# Patient Record
Sex: FEMALE | Race: WHITE | NOT HISPANIC OR LATINO | Employment: FULL TIME | URBAN - METROPOLITAN AREA
[De-identification: names, ages, dates, MRNs, and addresses within clinical notes are randomized per-mention and may not be internally consistent; named-entity substitution may affect disease eponyms.]

---

## 2017-01-19 ENCOUNTER — ALLSCRIPTS OFFICE VISIT (OUTPATIENT)
Dept: OTHER | Facility: OTHER | Age: 47
End: 2017-01-19

## 2017-01-19 DIAGNOSIS — R59.9 ENLARGED LYMPH NODES, UNSPECIFIED: ICD-10-CM

## 2017-03-09 ENCOUNTER — ALLSCRIPTS OFFICE VISIT (OUTPATIENT)
Dept: OTHER | Facility: OTHER | Age: 47
End: 2017-03-09

## 2017-03-15 ENCOUNTER — GENERIC CONVERSION - ENCOUNTER (OUTPATIENT)
Dept: OTHER | Facility: OTHER | Age: 47
End: 2017-03-15

## 2017-03-27 ENCOUNTER — TRANSCRIBE ORDERS (OUTPATIENT)
Dept: ADMINISTRATIVE | Facility: HOSPITAL | Age: 47
End: 2017-03-27

## 2017-03-27 DIAGNOSIS — Z12.31 ENCOUNTER FOR MAMMOGRAM TO ESTABLISH BASELINE MAMMOGRAM: Primary | ICD-10-CM

## 2017-03-27 DIAGNOSIS — Z91.89 PERSONAL HISTORY OF POISONING, PRESENTING HAZARDS TO HEALTH: ICD-10-CM

## 2017-03-27 DIAGNOSIS — R92.2 BREAST DENSITY: ICD-10-CM

## 2017-04-04 ENCOUNTER — HOSPITAL ENCOUNTER (OUTPATIENT)
Dept: RADIOLOGY | Facility: HOSPITAL | Age: 47
Discharge: HOME | End: 2017-04-04
Attending: SURGERY
Payer: COMMERCIAL

## 2017-04-04 DIAGNOSIS — Z91.89 OTHER SPECIFIED PERSONAL RISK FACTORS, NOT ELSEWHERE CLASSIFIED: ICD-10-CM

## 2017-04-04 DIAGNOSIS — R92.2 INCONCLUSIVE MAMMOGRAM: ICD-10-CM

## 2017-04-04 PROCEDURE — A9585 GADOBUTROL INJECTION: HCPCS | Performed by: SURGERY

## 2017-04-04 PROCEDURE — C8908 MRI W/O FOL W/CONT, BREAST,: HCPCS

## 2017-04-04 RX ADMIN — GADOBUTROL 6 ML: 604.72 INJECTION INTRAVENOUS at 16:34

## 2017-04-18 ENCOUNTER — ALLSCRIPTS OFFICE VISIT (OUTPATIENT)
Dept: OTHER | Facility: OTHER | Age: 47
End: 2017-04-18

## 2017-05-19 ENCOUNTER — ALLSCRIPTS OFFICE VISIT (OUTPATIENT)
Dept: OTHER | Facility: OTHER | Age: 47
End: 2017-05-19

## 2017-05-19 ENCOUNTER — GENERIC CONVERSION - ENCOUNTER (OUTPATIENT)
Dept: OTHER | Facility: OTHER | Age: 47
End: 2017-05-19

## 2017-06-27 ENCOUNTER — ALLSCRIPTS OFFICE VISIT (OUTPATIENT)
Dept: OTHER | Facility: OTHER | Age: 47
End: 2017-06-27

## 2017-06-27 PROCEDURE — 88305 TISSUE EXAM BY PATHOLOGIST: CPT | Performed by: OBSTETRICS & GYNECOLOGY

## 2017-06-29 ENCOUNTER — GENERIC CONVERSION - ENCOUNTER (OUTPATIENT)
Dept: OTHER | Facility: OTHER | Age: 47
End: 2017-06-29

## 2017-06-29 ENCOUNTER — LAB REQUISITION (OUTPATIENT)
Dept: LAB | Facility: HOSPITAL | Age: 47
End: 2017-06-29
Payer: COMMERCIAL

## 2017-06-29 DIAGNOSIS — N92.6 IRREGULAR MENSTRUATION: ICD-10-CM

## 2017-07-03 ENCOUNTER — GENERIC CONVERSION - ENCOUNTER (OUTPATIENT)
Dept: OTHER | Facility: OTHER | Age: 47
End: 2017-07-03

## 2017-07-05 ENCOUNTER — GENERIC CONVERSION - ENCOUNTER (OUTPATIENT)
Dept: OTHER | Facility: OTHER | Age: 47
End: 2017-07-05

## 2017-07-14 ENCOUNTER — ALLSCRIPTS OFFICE VISIT (OUTPATIENT)
Dept: OTHER | Facility: OTHER | Age: 47
End: 2017-07-14

## 2017-07-14 LAB
A/G RATIO (HISTORICAL): 2.3 (ref 1.2–2.2)
ALBUMIN SERPL BCP-MCNC: 4.5 G/DL (ref 3.5–5.5)
ALP SERPL-CCNC: 51 IU/L (ref 39–117)
ALT SERPL W P-5'-P-CCNC: 17 IU/L (ref 0–32)
AST SERPL W P-5'-P-CCNC: 19 IU/L (ref 0–40)
BILIRUB SERPL-MCNC: 0.7 MG/DL (ref 0–1.2)
BUN SERPL-MCNC: 17 MG/DL (ref 6–24)
BUN/CREA RATIO (HISTORICAL): 27 (ref 9–23)
CALCIUM SERPL-MCNC: 9.3 MG/DL (ref 8.7–10.2)
CHLORIDE SERPL-SCNC: 102 MMOL/L (ref 96–106)
CO2 SERPL-SCNC: 24 MMOL/L (ref 18–29)
CREAT SERPL-MCNC: 0.64 MG/DL (ref 0.57–1)
EGFR AFRICAN AMERICAN (HISTORICAL): 122 ML/MIN/1.73
EGFR-AMERICAN CALC (HISTORICAL): 106 ML/MIN/1.73
FSH (HISTORICAL): 5.5 MIU/ML
GLUCOSE SERPL-MCNC: 96 MG/DL (ref 65–99)
POTASSIUM SERPL-SCNC: 4.8 MMOL/L (ref 3.5–5.2)
SODIUM SERPL-SCNC: 141 MMOL/L (ref 134–144)
TOT. GLOBULIN, SERUM (HISTORICAL): 2 G/DL (ref 1.5–4.5)
TOTAL PROTEIN (HISTORICAL): 6.5 G/DL (ref 6–8.5)

## 2017-09-21 ENCOUNTER — GENERIC CONVERSION - ENCOUNTER (OUTPATIENT)
Dept: OTHER | Facility: OTHER | Age: 47
End: 2017-09-21

## 2017-09-21 ENCOUNTER — ANESTHESIA EVENT (OUTPATIENT)
Dept: PERIOP | Facility: HOSPITAL | Age: 47
End: 2017-09-21
Payer: COMMERCIAL

## 2017-09-22 NOTE — ANESTHESIA PREPROCEDURE EVALUATION
Review of Systems/Medical History  Patient summary reviewed  Chart reviewed  History of anesthetic complications PONV    Cardiovascular  Negative cardio ROS    Pulmonary  Negative pulmonary ROS ,        GI/Hepatic  Negative GI/hepatic ROS          Negative  ROS        Endo/Other  Negative endo/other ROS      GYN  Negative gynecology ROS          Hematology  Negative hematology ROS      Musculoskeletal  Negative musculoskeletal ROS        Neurology  Negative neurology ROS      Psychology   Negative psychology ROS Anxiety,            Physical Exam    Airway    Mallampati score:  I  TM Distance: >3 FB  Neck ROM: full     Dental   No notable dental hx     Cardiovascular  Comment: Negative ROS, Rhythm: regular, Rate: normal, Cardiovascular exam normal    Pulmonary  Pulmonary exam normal Breath sounds clear to auscultation,     Other Findings        Anesthesia Plan  ASA Score- 2       Anesthesia Type- general  Comment: SCOP patch ordered      Induction- intravenous      Informed Consent  Anesthetic plan and risks discussed with patient

## 2017-09-29 ENCOUNTER — HOSPITAL ENCOUNTER (OUTPATIENT)
Dept: MAMMOGRAPHY | Facility: MEDICAL CENTER | Age: 47
Discharge: HOME | End: 2017-09-29
Payer: COMMERCIAL

## 2017-09-29 DIAGNOSIS — Z12.31 ENCOUNTER FOR SCREENING MAMMOGRAM FOR MALIGNANT NEOPLASM OF BREAST: ICD-10-CM

## 2017-09-29 PROCEDURE — G0202 SCR MAMMO BI INCL CAD: HCPCS

## 2017-09-29 PROCEDURE — 77063 BREAST TOMOSYNTHESIS BI: CPT

## 2017-10-03 ENCOUNTER — HOSPITAL ENCOUNTER (OUTPATIENT)
Facility: HOSPITAL | Age: 47
Setting detail: OUTPATIENT SURGERY
Discharge: HOME | End: 2017-10-04
Attending: OBSTETRICS & GYNECOLOGY | Admitting: OBSTETRICS & GYNECOLOGY
Payer: COMMERCIAL

## 2017-10-03 ENCOUNTER — ANESTHESIA (OUTPATIENT)
Dept: PERIOP | Facility: HOSPITAL | Age: 47
End: 2017-10-03
Payer: COMMERCIAL

## 2017-10-03 DIAGNOSIS — D25.9 LEIOMYOMA OF UTERUS: ICD-10-CM

## 2017-10-03 DIAGNOSIS — R10.2 PELVIC AND PERINEAL PAIN: ICD-10-CM

## 2017-10-03 LAB
ABO GROUP BLD: NORMAL
ANION GAP SERPL CALCULATED.3IONS-SCNC: 9 MMOL/L (ref 4–13)
APTT PPP: 26 SECONDS (ref 23–35)
BASOPHILS # BLD MANUAL: 0 THOUSAND/UL (ref 0–0.1)
BASOPHILS NFR MAR MANUAL: 0 % (ref 0–1)
BLD GP AB SCN SERPL QL: NEGATIVE
BUN SERPL-MCNC: 11 MG/DL (ref 5–25)
CALCIUM SERPL-MCNC: 7.7 MG/DL (ref 8.3–10.1)
CHLORIDE SERPL-SCNC: 106 MMOL/L (ref 100–108)
CO2 SERPL-SCNC: 24 MMOL/L (ref 21–32)
CREAT SERPL-MCNC: 0.9 MG/DL (ref 0.6–1.3)
EOSINOPHIL # BLD MANUAL: 0 THOUSAND/UL (ref 0–0.4)
EOSINOPHIL NFR BLD MANUAL: 0 % (ref 0–6)
ERYTHROCYTE [DISTWIDTH] IN BLOOD BY AUTOMATED COUNT: 12.7 % (ref 11.6–15.1)
EXT PREGNANCY TEST URINE: NEGATIVE
GFR SERPL CREATININE-BSD FRML MDRD: 75 ML/MIN/1.73SQ M
GLUCOSE SERPL-MCNC: 162 MG/DL (ref 65–140)
HCT VFR BLD AUTO: 38.1 % (ref 34.8–46.1)
HCT VFR BLD AUTO: 39.8 % (ref 34.8–46.1)
HGB BLD-MCNC: 12.8 G/DL (ref 11.5–15.4)
HGB BLD-MCNC: 13.7 G/DL (ref 11.5–15.4)
INR PPP: 0.95 (ref 0.86–1.16)
LYMPHOCYTES # BLD AUTO: 0.71 THOUSAND/UL (ref 0.6–4.47)
LYMPHOCYTES # BLD AUTO: 5 % (ref 14–44)
MCH RBC QN AUTO: 30.9 PG (ref 26.8–34.3)
MCHC RBC AUTO-ENTMCNC: 33.6 G/DL (ref 31.4–37.4)
MCV RBC AUTO: 92 FL (ref 82–98)
MONOCYTES # BLD AUTO: 0.28 THOUSAND/UL (ref 0–1.22)
MONOCYTES NFR BLD: 2 % (ref 4–12)
NEUTROPHILS # BLD MANUAL: 13.13 THOUSAND/UL (ref 1.85–7.62)
NEUTS BAND NFR BLD MANUAL: 5 % (ref 0–8)
NEUTS SEG NFR BLD AUTO: 88 % (ref 43–75)
NRBC BLD AUTO-RTO: 0 /100 WBCS
PLATELET # BLD AUTO: 205 THOUSANDS/UL (ref 149–390)
PLATELET BLD QL SMEAR: ADEQUATE
PMV BLD AUTO: 10.5 FL (ref 8.9–12.7)
POTASSIUM SERPL-SCNC: 4 MMOL/L (ref 3.5–5.3)
PROTHROMBIN TIME: 12.7 SECONDS (ref 12.1–14.4)
RBC # BLD AUTO: 4.14 MILLION/UL (ref 3.81–5.12)
RBC MORPH BLD: NORMAL
RH BLD: POSITIVE
SODIUM SERPL-SCNC: 139 MMOL/L (ref 136–145)
SPECIMEN EXPIRATION DATE: NORMAL
TOTAL CELLS COUNTED SPEC: 100
WBC # BLD AUTO: 14.12 THOUSAND/UL (ref 4.31–10.16)

## 2017-10-03 PROCEDURE — 85730 THROMBOPLASTIN TIME PARTIAL: CPT | Performed by: OBSTETRICS & GYNECOLOGY

## 2017-10-03 PROCEDURE — 85027 COMPLETE CBC AUTOMATED: CPT | Performed by: OBSTETRICS & GYNECOLOGY

## 2017-10-03 PROCEDURE — 86850 RBC ANTIBODY SCREEN: CPT | Performed by: OBSTETRICS & GYNECOLOGY

## 2017-10-03 PROCEDURE — 85610 PROTHROMBIN TIME: CPT | Performed by: OBSTETRICS & GYNECOLOGY

## 2017-10-03 PROCEDURE — 80048 BASIC METABOLIC PNL TOTAL CA: CPT | Performed by: OBSTETRICS & GYNECOLOGY

## 2017-10-03 PROCEDURE — 85007 BL SMEAR W/DIFF WBC COUNT: CPT | Performed by: OBSTETRICS & GYNECOLOGY

## 2017-10-03 PROCEDURE — 88307 TISSUE EXAM BY PATHOLOGIST: CPT | Performed by: OBSTETRICS & GYNECOLOGY

## 2017-10-03 PROCEDURE — 85014 HEMATOCRIT: CPT | Performed by: OBSTETRICS & GYNECOLOGY

## 2017-10-03 PROCEDURE — 85018 HEMOGLOBIN: CPT | Performed by: OBSTETRICS & GYNECOLOGY

## 2017-10-03 PROCEDURE — 86900 BLOOD TYPING SEROLOGIC ABO: CPT | Performed by: OBSTETRICS & GYNECOLOGY

## 2017-10-03 PROCEDURE — 81025 URINE PREGNANCY TEST: CPT | Performed by: ANESTHESIOLOGY

## 2017-10-03 PROCEDURE — 86901 BLOOD TYPING SEROLOGIC RH(D): CPT | Performed by: OBSTETRICS & GYNECOLOGY

## 2017-10-03 RX ORDER — MEPERIDINE HYDROCHLORIDE 50 MG/ML
12.5 INJECTION INTRAMUSCULAR; INTRAVENOUS; SUBCUTANEOUS ONCE AS NEEDED
Status: DISCONTINUED | OUTPATIENT
Start: 2017-10-03 | End: 2017-10-03 | Stop reason: HOSPADM

## 2017-10-03 RX ORDER — FENTANYL CITRATE/PF 50 MCG/ML
50 SYRINGE (ML) INJECTION
Status: DISCONTINUED | OUTPATIENT
Start: 2017-10-03 | End: 2017-10-03 | Stop reason: HOSPADM

## 2017-10-03 RX ORDER — HYDROCODONE BITARTRATE AND ACETAMINOPHEN 5; 325 MG/1; MG/1
2 TABLET ORAL EVERY 6 HOURS PRN
Status: DISCONTINUED | OUTPATIENT
Start: 2017-10-03 | End: 2017-10-04 | Stop reason: HOSPADM

## 2017-10-03 RX ORDER — HYDROCODONE BITARTRATE AND ACETAMINOPHEN 5; 325 MG/1; MG/1
1 TABLET ORAL EVERY 6 HOURS PRN
Qty: 20 TABLET | Refills: 0 | Status: SHIPPED | OUTPATIENT
Start: 2017-10-03 | End: 2017-10-08

## 2017-10-03 RX ORDER — SODIUM CHLORIDE, SODIUM LACTATE, POTASSIUM CHLORIDE, CALCIUM CHLORIDE 600; 310; 30; 20 MG/100ML; MG/100ML; MG/100ML; MG/100ML
125 INJECTION, SOLUTION INTRAVENOUS CONTINUOUS
Status: DISCONTINUED | OUTPATIENT
Start: 2017-10-03 | End: 2017-10-04 | Stop reason: HOSPADM

## 2017-10-03 RX ORDER — HYDROMORPHONE HYDROCHLORIDE 2 MG/ML
INJECTION, SOLUTION INTRAMUSCULAR; INTRAVENOUS; SUBCUTANEOUS AS NEEDED
Status: DISCONTINUED | OUTPATIENT
Start: 2017-10-03 | End: 2017-10-03 | Stop reason: SURG

## 2017-10-03 RX ORDER — EPHEDRINE SULFATE 50 MG/ML
INJECTION, SOLUTION INTRAVENOUS AS NEEDED
Status: DISCONTINUED | OUTPATIENT
Start: 2017-10-03 | End: 2017-10-03 | Stop reason: SURG

## 2017-10-03 RX ORDER — DOCUSATE SODIUM 100 MG/1
100 CAPSULE, LIQUID FILLED ORAL 2 TIMES DAILY
Status: DISCONTINUED | OUTPATIENT
Start: 2017-10-03 | End: 2017-10-04 | Stop reason: HOSPADM

## 2017-10-03 RX ORDER — PROPOFOL 10 MG/ML
INJECTION, EMULSION INTRAVENOUS AS NEEDED
Status: DISCONTINUED | OUTPATIENT
Start: 2017-10-03 | End: 2017-10-03 | Stop reason: SURG

## 2017-10-03 RX ORDER — SODIUM CHLORIDE, SODIUM LACTATE, POTASSIUM CHLORIDE, CALCIUM CHLORIDE 600; 310; 30; 20 MG/100ML; MG/100ML; MG/100ML; MG/100ML
125 INJECTION, SOLUTION INTRAVENOUS CONTINUOUS
Status: DISCONTINUED | OUTPATIENT
Start: 2017-10-03 | End: 2017-10-03 | Stop reason: HOSPADM

## 2017-10-03 RX ORDER — ACETAMINOPHEN 325 MG/1
650 TABLET ORAL EVERY 6 HOURS PRN
Status: DISCONTINUED | OUTPATIENT
Start: 2017-10-03 | End: 2017-10-04 | Stop reason: HOSPADM

## 2017-10-03 RX ORDER — MIDAZOLAM HYDROCHLORIDE 1 MG/ML
INJECTION INTRAMUSCULAR; INTRAVENOUS AS NEEDED
Status: DISCONTINUED | OUTPATIENT
Start: 2017-10-03 | End: 2017-10-03 | Stop reason: SURG

## 2017-10-03 RX ORDER — DEXAMETHASONE SODIUM PHOSPHATE 4 MG/ML
INJECTION, SOLUTION INTRA-ARTICULAR; INTRALESIONAL; INTRAMUSCULAR; INTRAVENOUS; SOFT TISSUE AS NEEDED
Status: DISCONTINUED | OUTPATIENT
Start: 2017-10-03 | End: 2017-10-03 | Stop reason: SURG

## 2017-10-03 RX ORDER — IBUPROFEN 600 MG/1
600 TABLET ORAL EVERY 6 HOURS PRN
Status: DISCONTINUED | OUTPATIENT
Start: 2017-10-03 | End: 2017-10-04 | Stop reason: HOSPADM

## 2017-10-03 RX ORDER — FENTANYL CITRATE 50 UG/ML
INJECTION, SOLUTION INTRAMUSCULAR; INTRAVENOUS AS NEEDED
Status: DISCONTINUED | OUTPATIENT
Start: 2017-10-03 | End: 2017-10-03 | Stop reason: SURG

## 2017-10-03 RX ORDER — ONDANSETRON 2 MG/ML
4 INJECTION INTRAMUSCULAR; INTRAVENOUS ONCE AS NEEDED
Status: DISCONTINUED | OUTPATIENT
Start: 2017-10-03 | End: 2017-10-03 | Stop reason: HOSPADM

## 2017-10-03 RX ORDER — GLYCOPYRROLATE 0.2 MG/ML
INJECTION INTRAMUSCULAR; INTRAVENOUS AS NEEDED
Status: DISCONTINUED | OUTPATIENT
Start: 2017-10-03 | End: 2017-10-03 | Stop reason: SURG

## 2017-10-03 RX ORDER — SODIUM CHLORIDE 9 MG/ML
125 INJECTION, SOLUTION INTRAVENOUS CONTINUOUS
Status: DISCONTINUED | OUTPATIENT
Start: 2017-10-03 | End: 2017-10-03 | Stop reason: HOSPADM

## 2017-10-03 RX ORDER — HYDROCODONE BITARTRATE AND ACETAMINOPHEN 5; 325 MG/1; MG/1
1 TABLET ORAL EVERY 6 HOURS PRN
Status: DISCONTINUED | OUTPATIENT
Start: 2017-10-03 | End: 2017-10-04 | Stop reason: HOSPADM

## 2017-10-03 RX ORDER — ONDANSETRON 2 MG/ML
4 INJECTION INTRAMUSCULAR; INTRAVENOUS EVERY 6 HOURS PRN
Status: DISCONTINUED | OUTPATIENT
Start: 2017-10-03 | End: 2017-10-04 | Stop reason: HOSPADM

## 2017-10-03 RX ORDER — SCOLOPAMINE TRANSDERMAL SYSTEM 1 MG/1
1 PATCH, EXTENDED RELEASE TRANSDERMAL ONCE AS NEEDED
Status: DISCONTINUED | OUTPATIENT
Start: 2017-10-03 | End: 2017-10-03

## 2017-10-03 RX ORDER — SODIUM CHLORIDE 9 MG/ML
INJECTION, SOLUTION INTRAVENOUS CONTINUOUS PRN
Status: DISCONTINUED | OUTPATIENT
Start: 2017-10-03 | End: 2017-10-03 | Stop reason: SURG

## 2017-10-03 RX ORDER — ONDANSETRON 2 MG/ML
INJECTION INTRAMUSCULAR; INTRAVENOUS AS NEEDED
Status: DISCONTINUED | OUTPATIENT
Start: 2017-10-03 | End: 2017-10-03 | Stop reason: SURG

## 2017-10-03 RX ORDER — ROCURONIUM BROMIDE 10 MG/ML
INJECTION, SOLUTION INTRAVENOUS AS NEEDED
Status: DISCONTINUED | OUTPATIENT
Start: 2017-10-03 | End: 2017-10-03 | Stop reason: SURG

## 2017-10-03 RX ORDER — ALPRAZOLAM 0.25 MG/1
0.25 TABLET ORAL 3 TIMES DAILY PRN
Status: DISCONTINUED | OUTPATIENT
Start: 2017-10-03 | End: 2017-10-04 | Stop reason: HOSPADM

## 2017-10-03 RX ADMIN — ROCURONIUM BROMIDE 10 MG: 10 INJECTION, SOLUTION INTRAVENOUS at 16:27

## 2017-10-03 RX ADMIN — ROCURONIUM BROMIDE 50 MG: 10 INJECTION, SOLUTION INTRAVENOUS at 14:31

## 2017-10-03 RX ADMIN — CEFAZOLIN SODIUM 1000 MG: 1 SOLUTION INTRAVENOUS at 14:37

## 2017-10-03 RX ADMIN — ROCURONIUM BROMIDE 10 MG: 10 INJECTION, SOLUTION INTRAVENOUS at 16:53

## 2017-10-03 RX ADMIN — HYDROCODONE BITARTRATE AND ACETAMINOPHEN 2 TABLET: 5; 325 TABLET ORAL at 21:33

## 2017-10-03 RX ADMIN — FENTANYL CITRATE 50 MCG: 50 INJECTION, SOLUTION INTRAMUSCULAR; INTRAVENOUS at 16:53

## 2017-10-03 RX ADMIN — FENTANYL CITRATE 100 MCG: 50 INJECTION, SOLUTION INTRAMUSCULAR; INTRAVENOUS at 14:31

## 2017-10-03 RX ADMIN — SODIUM CHLORIDE 1000 ML: 0.9 INJECTION, SOLUTION INTRAVENOUS at 20:27

## 2017-10-03 RX ADMIN — SODIUM CHLORIDE: 0.9 INJECTION, SOLUTION INTRAVENOUS at 14:38

## 2017-10-03 RX ADMIN — FENTANYL CITRATE 50 MCG: 50 INJECTION INTRAMUSCULAR; INTRAVENOUS at 18:20

## 2017-10-03 RX ADMIN — GLYCOPYRROLATE 0.6 MG: 0.2 INJECTION, SOLUTION INTRAMUSCULAR; INTRAVENOUS at 17:11

## 2017-10-03 RX ADMIN — HYDROMORPHONE HYDROCHLORIDE 0.25 MG: 2 INJECTION, SOLUTION INTRAMUSCULAR; INTRAVENOUS; SUBCUTANEOUS at 17:25

## 2017-10-03 RX ADMIN — HYDROMORPHONE HYDROCHLORIDE 0.25 MG: 2 INJECTION, SOLUTION INTRAMUSCULAR; INTRAVENOUS; SUBCUTANEOUS at 17:18

## 2017-10-03 RX ADMIN — EPHEDRINE SULFATE 10 MG: 50 INJECTION, SOLUTION INTRAMUSCULAR; INTRAVENOUS; SUBCUTANEOUS at 15:03

## 2017-10-03 RX ADMIN — SODIUM CHLORIDE, SODIUM LACTATE, POTASSIUM CHLORIDE, AND CALCIUM CHLORIDE 125 ML/HR: .6; .31; .03; .02 INJECTION, SOLUTION INTRAVENOUS at 23:49

## 2017-10-03 RX ADMIN — SCOPALAMINE 1 PATCH: 1 PATCH, EXTENDED RELEASE TRANSDERMAL at 13:26

## 2017-10-03 RX ADMIN — HYDROMORPHONE HYDROCHLORIDE 0.5 MG: 2 INJECTION, SOLUTION INTRAMUSCULAR; INTRAVENOUS; SUBCUTANEOUS at 17:27

## 2017-10-03 RX ADMIN — SODIUM CHLORIDE: 0.9 INJECTION, SOLUTION INTRAVENOUS at 16:06

## 2017-10-03 RX ADMIN — HYDROMORPHONE HYDROCHLORIDE 0.25 MG: 2 INJECTION, SOLUTION INTRAMUSCULAR; INTRAVENOUS; SUBCUTANEOUS at 17:21

## 2017-10-03 RX ADMIN — DEXAMETHASONE SODIUM PHOSPHATE 4 MG: 4 INJECTION, SOLUTION INTRAMUSCULAR; INTRAVENOUS at 17:04

## 2017-10-03 RX ADMIN — ROCURONIUM BROMIDE 10 MG: 10 INJECTION, SOLUTION INTRAVENOUS at 15:30

## 2017-10-03 RX ADMIN — DOCUSATE SODIUM 100 MG: 100 CAPSULE, LIQUID FILLED ORAL at 20:31

## 2017-10-03 RX ADMIN — DEXAMETHASONE SODIUM PHOSPHATE 4 MG: 4 INJECTION, SOLUTION INTRAMUSCULAR; INTRAVENOUS at 14:44

## 2017-10-03 RX ADMIN — FENTANYL CITRATE 50 MCG: 50 INJECTION, SOLUTION INTRAMUSCULAR; INTRAVENOUS at 16:05

## 2017-10-03 RX ADMIN — EPHEDRINE SULFATE 10 MG: 50 INJECTION, SOLUTION INTRAMUSCULAR; INTRAVENOUS; SUBCUTANEOUS at 16:29

## 2017-10-03 RX ADMIN — HYDROMORPHONE HYDROCHLORIDE 0.25 MG: 2 INJECTION, SOLUTION INTRAMUSCULAR; INTRAVENOUS; SUBCUTANEOUS at 17:15

## 2017-10-03 RX ADMIN — NEOSTIGMINE METHYLSULFATE 3 MG: 1 INJECTION, SOLUTION INTRAMUSCULAR; INTRAVENOUS; SUBCUTANEOUS at 17:11

## 2017-10-03 RX ADMIN — MIDAZOLAM HYDROCHLORIDE 2 MG: 1 INJECTION, SOLUTION INTRAMUSCULAR; INTRAVENOUS at 14:22

## 2017-10-03 RX ADMIN — FENTANYL CITRATE 50 MCG: 50 INJECTION INTRAMUSCULAR; INTRAVENOUS at 18:25

## 2017-10-03 RX ADMIN — ONDANSETRON HYDROCHLORIDE 4 MG: 2 INJECTION, SOLUTION INTRAVENOUS at 14:44

## 2017-10-03 RX ADMIN — ONDANSETRON HYDROCHLORIDE 4 MG: 2 INJECTION, SOLUTION INTRAVENOUS at 17:04

## 2017-10-03 RX ADMIN — PROPOFOL 200 MG: 10 INJECTION, EMULSION INTRAVENOUS at 14:31

## 2017-10-03 RX ADMIN — HYDROMORPHONE HYDROCHLORIDE 0.5 MG: 2 INJECTION, SOLUTION INTRAMUSCULAR; INTRAVENOUS; SUBCUTANEOUS at 17:34

## 2017-10-03 RX ADMIN — LIDOCAINE HYDROCHLORIDE 60 MG: 20 INJECTION, SOLUTION INTRAVENOUS at 14:31

## 2017-10-03 RX ADMIN — SODIUM CHLORIDE, SODIUM LACTATE, POTASSIUM CHLORIDE, AND CALCIUM CHLORIDE 125 ML/HR: .6; .31; .03; .02 INJECTION, SOLUTION INTRAVENOUS at 18:12

## 2017-10-03 RX ADMIN — ACETAMINOPHEN 650 MG: 325 TABLET, FILM COATED ORAL at 20:26

## 2017-10-03 RX ADMIN — SODIUM CHLORIDE 125 ML/HR: 0.9 INJECTION, SOLUTION INTRAVENOUS at 13:32

## 2017-10-03 RX ADMIN — IBUPROFEN 600 MG: 600 TABLET, FILM COATED ORAL at 23:48

## 2017-10-03 NOTE — OP NOTE
OPERATIVE REPORT  PATIENT NAME: Mark Anthony Mercado    :  1970  MRN: [id number]  Pt Location: [facility] OR ROOM 03    SURGERY DATE: 10/3/2017    Surgeon(s) and Role:     * Lesley Larson DO - Primary     * Bette Arriaga MD - Assisting     * Austen Cotto MD - Fellow, Assisting    Preop Diagnosis:  Leiomyoma of uterus [D25 9]  Pelvic and perineal pain [R10 2]    Post-Op Diagnosis Codes:     * Leiomyoma of uterus [D25 9]     * Pelvic and perineal pain [R10 2]    Procedure(s) (LRB):  HYSTERECTOMY LAPAROSCOPIC SUPRACERVICAL ([address]) (N/A)  SALPINGECTOMY (Bilateral)  CYSTOSCOPY (N/A)    Specimen(s):  ID Type Source Tests Collected by Time Destination   1 : Uterus, bilateral fallopian tubes, and fibroids (morcelated in bag) Tissue Uterus TISSUE EXAM Lesley Larson DO 10/3/2017 1604        Estimated Blood Loss:   100 mL    Drains:   None    Anesthesia Type:   General    Operative Indications:  Leiomyoma of uterus [D25 9]  Pelvic and perineal pain [R10 2]      Operative Findings:  multiglobular fibroid uterus approx 10 weeks in size  Normal ovaries and tubes bilaterally  Implants of endometriosis left pelvic sidewall  Postprocedural cystoscopy showed intact bladder mucosa, no defects  Good efflux noted bilaterally  Normal urethra    Complications:   None    Procedure and Technique:  Appropriate preoperative antibiotics chosen per ACOG guidelines were given  Bilateral SCDs were placed in the lower extremities for DVT prevention prior to the institution of anesthesia  No bladder, ureteral, viscus, or solid organ injury were noted at the end of the procedure  The patient was properly identified in the holding area by the operating room staff and attending physician  She was taken to   operating room where general anesthesia was instituted without complication  She was placed in the dorsal lithotomy position with her   legs in [address] [place] [address] with care taken to avoid excessive flexion or extension of her lower extremities   Once the patient was properly positioned, the patient was prepped and draped in normal sterile   fashion  A time-out was taken  The patient was again properly identified by the operating room staff and attending physician  At this time, the patient was receiving antibiotics for prophylaxis       The uterine manipulator was placed without complications  Only the tip of the Vulsellum manipulator was used after we sounded the uterus to 6 cm  A Nunn   catheter was aseptically inserted  Gloves were changed, and attention was directed to the abdomen       Two Allis clamps were used to susanna the umbilicus  A 10-mm incision was created at the level of the umbilicus  A 10mm optical trochar was tent advanced under direct visualization into the peritoneal cavity with the skin tented with  Graspers  Once we were intraperitoneal, we allowed CO2 gas to go in until we reached a pressure of 15 mmHg  We visualized the area underneath our trochar site, no bleeding or injury was noted  Next, two additional trocars were placed on the   lower quadrant approximately 2 fingerbreadths above the anterior superior iliac spine and 2 cm medial to that spot  A 5-mm incision was created under transillumination, and we always observed the   inferior epigastric vessels trying to avoid injury to this vessel  Once the 3 trocars were placed, we started the procedure       First, a survey of the cavity was performed, and we confirmed the above-mentioned findings  We started a salpingectomy on the right side  The salpingectomy was performed by transecting the right   fallopian tube to the mesosalpinx all the way to the level of the cornu  This was resected, and we used the LigaSure device for this purpose  The left contralateral tube was treated in the same manner  At this time, a salpingectomy was performed and the tubes were removed   The round ligament was opened on the right side, and we developed the vesicouterine space from right to left until we were   able to develop the vesicovaginal space without complications  The bladder was dissected down to decrease the injury to the bladder  Once we created the vesicovaginal space, we were able to transect the utero-ovarian ligament on the right side and transected and cut  The posterior lip of the peritoneum was skeletonized until we were able to see the uterine arteries at the level of the internal cervical os  These uterine arteries were double burned and cut without complications  The left side was treated in the same fashion, first   taking the round ligament down and developing the vesicouterine space and then transecting the utero-ovarian ligament and then skeletonizing the uterine arteries  Once we secured the uterine arteries on the left side, we noticed blanching of the uterus  At this time we performed a myomectomy by amputating some of the pedunculated fibroids with a monopolar hook cautery to decrease the bulk of the uterus and obtain better visualization of the lower uterine segment  A total of 5 pedunculated fibroids were removed in this fashion  We introduced the Radha loop  Once we were able to introduce the Radha loop, this was introduced and placed at the level of the endocervical canal  The uterine manipulator was removed  We activated the loop and we transected the corpus of the uterus without complications  We noticed a small bleeding vessel near the left uterine artery bundle  This was secured and rendered hemostatic with a short burst of bipolar cautery  The endocervical canal was fulgurated using the monopolar cautery  Once we transected the corpus of the uterus, a 2 5-cm incision was created at the level of the suprapubic area in the midline in a horizontal fashion  We used a scalpel to create an   incision, and then dissection was created until we reached the rectus fascia  The rectus fascia was opened with a scalpel and Moran scissors   Once we were able to extend the rectus fascia laterally, we entered   the peritoneal cavity with sharp dissection  This was used and extended, and we inserted a small Curtis O retractor  A GelPoint was placed, and then we obtained pneumoperitoneum again  This was   achieved, and a 10-mm trocar was inserted through the GelPoint and we were able to introduce the 15-mm bag without complications  The specimen was placed in the bag, and the bag was retrieved without   complications  The fibroid uterus was morcellated in the bag using the excite technique and removed  The bag was inspected and visualized intacted  Pneumoperitoneum was achieved by replacing the Gelport, each of the 5 previously amputated pedunculated fibroids were retrieved and removed under direct visualization through the 2 5cm incision  The Curtis O retractor was removed  The fascia was closed with 0 Vicryl suture in a running stitch  The subcutaneous fascia was closed with plain suture  The skin was closed with 4-0 Monocryl       Using the laparoscopic trocar, we did a survey of the pelvic cavity and we confirmed good hemostasis  This was confirmed with the intraabdominal pressure dropped to 5 mmhg  A piece of surgicel was then placed along the cervical stump which was hemostatic  The procedure   was concluded  A vaginal exam was done  The cervix was hemostatic  The Nunn catheter was then used to drain the bladder and then it was removed and a diagnostic cystoscopy was performed by instilling 300 mL of fluid into the bladder  Both ureters were effluxing normally and there were no lacerations in the lower urinary tract  The patient tolerated the procedure well  The trocars were removed  The gas was allowed to escape  The skin incisions were closed with  4-0 vicryl without complications  The patient   went to the recovery room in a stable condition, and she is stable at the moment of dictation  All sponge, lap, needle, instrument counts were correct times 2        The patient was given a prescription for postoperative pain control  The prescription narcotic monitoring site was reviewed for this patient's medication history      Dr Dario Cuba was present for the entire procedure    Patient Disposition:  PACU     SIGNATURE: Harman Savage MD  DATE: October 3, 2017  TIME: 5:22 PM

## 2017-10-03 NOTE — DISCHARGE INSTRUCTIONS
Laparoscopic Hysterectomy   WHAT YOU SHOULD KNOW:   Laparoscopic hysterectomy [facility]) is surgery to remove your uterus only (partial hysterectomy), or your uterus and cervix (total hysterectomy)  Other organs, such as your ovaries and fallopian tubes, may also be removed  AFTER YOU LEAVE:   Medicines:   · Medicines  may be given to decrease pain or to treat or prevent a bacterial infection  Ask your primary healthcare provider [facility]) how to take prescription pain medicine safely  You may also need to take hormone medicine, such as estrogen  · Take your medicine as directed  Contact your PHP if you think your medicine is not helping or if you have side effects  Tell him if you are allergic to any medicine  Keep a list of the medicines, vitamins, and herbs you take  Include the amounts, and when and why you take them  Bring the list or the pill bottles to follow-up visits  Carry your medicine list with you in case of an emergency  Activity guidelines: You may feel like resting more after surgery  Slowly start to do more each day  Rest when you feel it is needed  Ask when you can return to your usual activities  What to expect after surgery:   · Ask your gynecologist or PHP about routine tests that you will need  · It is normal to have some bleeding from your vagina after certain types of hysterectomy surgery  Ask your gynecologist or PHP if you should expect bleeding, and how much bleeding to expect  Contact your gynecologist or PHP if:   · You have a fever  · You have pain that gets worse or does not decrease or go away with treatment  · You notice pus or a foul-smelling drainage coming from an incision, or it is red or swollen  · You have new or more bright red bleeding from your vagina or your incisions  · You have yellow, green, or foul-smelling discharge coming from your vagina  · You have trouble urinating, burning when you urinate, or feel a need to urinate often      · You have trouble having a bowel movement  · Your skin is itchy, swollen, or has a rash  · You have questions or concerns about your condition or care  Seek care immediately or call 911 if:   · You are bleeding from your vagina, or bleeding more than you were told to expect  · Your arm or leg feels warm, tender, and painful  It may look swollen and red  · You feel lightheaded, short of breath, and have chest pain  · You cough up blood  © 2014 [address] is for End User's use only and may not be sold, redistributed or otherwise used for commercial purposes  All illustrations and images included in CareNotes® are the copyrighted property of A D A M , Inc  or Jordy Rodriguez  The above information is an  only  It is not intended as medical advice for individual conditions or treatments  Talk to your doctor, nurse or pharmacist before following any medical regimen to see if it is safe and effective for you

## 2017-10-03 NOTE — DISCHARGE SUMMARY
Discharge Summary   Pravin Akhtar MRN: [id number]  Unit/Bed#: [place] [address] 223-01 Encounter: [id number]      Admission Date: 10/3/2017     Discharge Date: 10/4/17    Attending: No att  providers found    Principal Diagnosis: Leiomyoma of uterus [D25 9]  Pelvic and perineal pain [R10 2]    Secondary Diagnosis: psoriasis, lichen simplex chronicus, anxiety, lactose & gluten intolerance    Procedures: diagnostic laparoscopy, supracervical hysterectomy, cystoscopy    Hospital course: Patient was admitted for post-op care  Her stafford catheter was removed in the OR and was able to void spontaneously  She developed a low grade fever 100 5F POD#0 that was felt to be related to anesthesia with no recurrences on POD#1  Her home medications were continued for her chronic conditions, which were stable during her stay  Her pain was controlled with PO tylenol motrin, and norco      She was discharged home on POD#1  At this time she was ambulating, pain was well controlled on oral medications, she was voiding spontaneously, and had appropriate bowel function  She will follow in in 1-2 weeks  Lab Results:   Lab Results   Component Value Date    WBC 12 57 (H) 10/04/2017    HGB 11 9 10/04/2017    HCT 36 1 10/04/2017    MCV 94 10/04/2017     10/04/2017     Lab Results   Component Value Date    GLUCOSE 104 10/04/2017    CALCIUM 7 9 (L) 10/04/2017     10/04/2017    K 5 1 10/04/2017    CO2 22 10/04/2017     10/04/2017    BUN 8 10/04/2017    CREATININE 0 59 (L) 10/04/2017     No results found for: POCGLU  Lab Results   Component Value Date    PTT 26 10/03/2017     Lab Results   Component Value Date    INR 0 95 10/03/2017    PROTIME 12 7 20/05/7013       Complications: none apparent    Condition at discharge: stable     Discharge instructions/Information to patient and family:   See After Visit Summary for information provided to patient and family        Provisions for Follow-Up Care:  See After Visit Summary for information related to follow-up care and any pertinent home health orders  Disposition: See After Visit Summary for discharge disposition information  Planned Readmission: No    Discharge Medications: For a complete list of the patient's medications, please refer to her med rec      Leroy Villarreal MD

## 2017-10-04 VITALS
RESPIRATION RATE: 20 BRPM | BODY MASS INDEX: 23.57 KG/M2 | HEIGHT: 63 IN | OXYGEN SATURATION: 98 % | HEART RATE: 75 BPM | TEMPERATURE: 100.1 F | SYSTOLIC BLOOD PRESSURE: 108 MMHG | DIASTOLIC BLOOD PRESSURE: 54 MMHG | WEIGHT: 133 LBS

## 2017-10-04 LAB
ANION GAP SERPL CALCULATED.3IONS-SCNC: 8 MMOL/L (ref 4–13)
BACTERIA UR QL AUTO: ABNORMAL /HPF
BILIRUB UR QL STRIP: NEGATIVE
BUN SERPL-MCNC: 8 MG/DL (ref 5–25)
CALCIUM SERPL-MCNC: 7.9 MG/DL (ref 8.3–10.1)
CHLORIDE SERPL-SCNC: 106 MMOL/L (ref 100–108)
CLARITY UR: CLEAR
CO2 SERPL-SCNC: 22 MMOL/L (ref 21–32)
COLOR UR: YELLOW
CREAT SERPL-MCNC: 0.59 MG/DL (ref 0.6–1.3)
ERYTHROCYTE [DISTWIDTH] IN BLOOD BY AUTOMATED COUNT: 12.9 % (ref 11.6–15.1)
GFR SERPL CREATININE-BSD FRML MDRD: 108 ML/MIN/1.73SQ M
GLUCOSE P FAST SERPL-MCNC: 104 MG/DL (ref 65–99)
GLUCOSE SERPL-MCNC: 104 MG/DL (ref 65–140)
GLUCOSE UR STRIP-MCNC: NEGATIVE MG/DL
HCT VFR BLD AUTO: 36.1 % (ref 34.8–46.1)
HGB BLD-MCNC: 11.9 G/DL (ref 11.5–15.4)
HGB UR QL STRIP.AUTO: ABNORMAL
KETONES UR STRIP-MCNC: NEGATIVE MG/DL
LEUKOCYTE ESTERASE UR QL STRIP: NEGATIVE
MCH RBC QN AUTO: 30.8 PG (ref 26.8–34.3)
MCHC RBC AUTO-ENTMCNC: 33 G/DL (ref 31.4–37.4)
MCV RBC AUTO: 94 FL (ref 82–98)
NITRITE UR QL STRIP: NEGATIVE
NON-SQ EPI CELLS URNS QL MICRO: ABNORMAL /HPF
PH UR STRIP.AUTO: 5.5 [PH] (ref 4.5–8)
PLATELET # BLD AUTO: 188 THOUSANDS/UL (ref 149–390)
PMV BLD AUTO: 11 FL (ref 8.9–12.7)
POTASSIUM SERPL-SCNC: 5.1 MMOL/L (ref 3.5–5.3)
PROT UR STRIP-MCNC: NEGATIVE MG/DL
RBC # BLD AUTO: 3.86 MILLION/UL (ref 3.81–5.12)
RBC #/AREA URNS AUTO: ABNORMAL /HPF
SODIUM SERPL-SCNC: 136 MMOL/L (ref 136–145)
SP GR UR STRIP.AUTO: <=1.005 (ref 1–1.03)
UROBILINOGEN UR QL STRIP.AUTO: 0.2 E.U./DL
WBC # BLD AUTO: 12.57 THOUSAND/UL (ref 4.31–10.16)
WBC #/AREA URNS AUTO: ABNORMAL /HPF

## 2017-10-04 PROCEDURE — 85027 COMPLETE CBC AUTOMATED: CPT | Performed by: OBSTETRICS & GYNECOLOGY

## 2017-10-04 PROCEDURE — 87086 URINE CULTURE/COLONY COUNT: CPT | Performed by: OBSTETRICS & GYNECOLOGY

## 2017-10-04 PROCEDURE — 80048 BASIC METABOLIC PNL TOTAL CA: CPT | Performed by: OBSTETRICS & GYNECOLOGY

## 2017-10-04 PROCEDURE — 81001 URINALYSIS AUTO W/SCOPE: CPT | Performed by: OBSTETRICS & GYNECOLOGY

## 2017-10-04 RX ORDER — NITROFURANTOIN 25; 75 MG/1; MG/1
100 CAPSULE ORAL 2 TIMES DAILY
Qty: 14 CAPSULE | Refills: 0 | Status: SHIPPED | OUTPATIENT
Start: 2017-10-04 | End: 2017-10-11

## 2017-10-04 RX ADMIN — DOCUSATE SODIUM 100 MG: 100 CAPSULE, LIQUID FILLED ORAL at 08:14

## 2017-10-04 RX ADMIN — IBUPROFEN 600 MG: 600 TABLET, FILM COATED ORAL at 13:42

## 2017-10-04 RX ADMIN — DOCUSATE SODIUM 100 MG: 100 CAPSULE, LIQUID FILLED ORAL at 17:22

## 2017-10-04 RX ADMIN — IBUPROFEN 600 MG: 600 TABLET, FILM COATED ORAL at 07:40

## 2017-10-04 RX ADMIN — HYDROCODONE BITARTRATE AND ACETAMINOPHEN 1 TABLET: 5; 325 TABLET ORAL at 03:33

## 2017-10-04 RX ADMIN — SODIUM CHLORIDE, SODIUM LACTATE, POTASSIUM CHLORIDE, AND CALCIUM CHLORIDE 125 ML/HR: .6; .31; .03; .02 INJECTION, SOLUTION INTRAVENOUS at 06:18

## 2017-10-04 NOTE — PROGRESS NOTES
Progress Note - OB/GYN  Nash Marte 50 y o  female MRN: [id number]  Unit/Bed#: [place] [address] 2 [address] 87 223-01 Encounter: [id number]      Nash Marte has no current complaints  Pain is present - adequately treated  Patient is currently voiding  She is ambulating  Patient is currently passing flatus and has had no bowel movement  She is tolerating PO, and denies nausea or vomitting  Patient denies fever, chills, chest pain, shortness of breath, or calf tenderness  /57   Pulse 75   Temp 98 8 °F (37 1 °C) (Tympanic)   Resp 18   Ht 5' 3" (1 6 m)   Wt 60 3 kg (133 lb)   LMP 09/09/2017 (Exact Date)   SpO2 98%   BMI 23 56 kg/m²     I/O last 3 completed shifts: In: 3150 [I V :3150]  Out: 350 [Urine:250; Blood:100]  I/O this shift:  In: 2275 8 [P O :480; I V :795 8; IV Piggyback:1000]  Out: 675 [Urine:675]    Lab Results   Component Value Date    WBC 12 57 (H) 10/04/2017    HGB 11 9 10/04/2017    HCT 36 1 10/04/2017    MCV 94 10/04/2017     10/04/2017       Lab Results   Component Value Date    GLUCOSE 162 (H) 10/03/2017    CALCIUM 7 7 (L) 10/03/2017     10/03/2017    K 4 0 10/03/2017    CO2 24 10/03/2017     10/03/2017    BUN 11 10/03/2017    CREATININE 0 90 10/03/2017       No results found for: POCGLU    Physical Exam  Gen: AAOx3, NAD, comfortable in bed  CVS: S1S2+, RRR, no murmurs  Lungs: CTA b/l normal respiratory effort and rate  Abdomen: soft, non tender, incisions C/D/I  Extremities: SCDs on and on, non tender    A/P: 49 yo S/p LSH, BS ancd cysto POD#1  1) Pain: well controlled on current regimen, continue  2) FEN: LR 125cc/h, Regular ADAT  3) Postop fever: resolved, likely 2/2 to anesthesia   4) AM labs: Hb from 12 8 to 11 9, WBC trending down from 14 12 to 12 57  5) DVT PPx: SCDs, Encouraged incentive spirometry to reduce atelectasis and pneumonia risk, Encouraged ambulation as tolerated    Dispo:  Anticipate DC later today

## 2017-10-04 NOTE — PROGRESS NOTES
Patient felt a need to void and ambulated for BRP, however was only able to void 25ml  OB resident on the floor to see the patient at this time  Informed OB resident of patient's decreased urine output  New order for STAT labs and to perform a bladder scan  Lab results are pending  Bladder scan revealed 60 ml  Patient medicated with norco for surgical pain  Appears more comfortable  No apparent distress  Using incentive spirometry and drinking liquids   remains at bedside  Call light in reach

## 2017-10-04 NOTE — PROGRESS NOTES
Progress Note - OB/GYN   Stephanie Mccrary 50 y o  female MRN: [id number]  Unit/Bed#: [place] 68 2 -01 Encounter: [id number]    Assessment:  S/p laparoscopic supracervical hysterectomy  B/l salpingectomy  Cystoscopy  Fever last night    Plan:  Tentative discharge later today if afebrile  reviewed discharge instructions  Reviewed activity restrictions  Ibuprofen and vicodin for pain prn  Colace prn  Incentive spirometer  ambulation    Subjective/Objective   Chief Complaint: incisional pain    Subjective: pt denies nausea, some rib and shoulder pain, had dysuria but that is resolving, minimal vaginal bleeding, tolerating po, voiding well    Objective: AAO x 3, appears well, had temp last night upon return fro PACU, this resolved around 2330, after fluid bolus  Mildly elevated WBC at 14, today is 12, consistent with recent surgery, she is ambulating and using the incentive spirometer  Vitals: Blood pressure 97/55, pulse 57, temperature 100 5 °F (38 1 °C), temperature source Tympanic, resp  rate 17, height 5' 3" (1 6 m), weight 60 3 kg (133 lb), last menstrual period 09/09/2017, SpO2 99 %, not currently breastfeeding  ,Body mass index is 23 56 kg/m²  Intake/Output Summary (Last 24 hours) at 10/04/17 5946  Last data filed at 10/04/17 8427   Gross per 24 hour   Intake          6714 16 ml   Output             1625 ml   Net          5089 16 ml       Invasive Devices     Peripheral Intravenous Line            Peripheral IV 10/03/17 Left Forearm less than 1 day    Peripheral IV 10/03/17 Right Hand less than 1 day                Physical Exam: heart - reg  Lungs - clear b/l  abd - soft, incisional tenderness, good BS, incisions clean and dry   Ext - non tender, no edema    Lab, Imaging and other studies: I have personally reviewed pertinent reports

## 2017-10-04 NOTE — PLAN OF CARE
INFECTION - ADULT     Absence or prevention of progression during hospitalization Progressing        PAIN - ADULT     Verbalizes/displays adequate comfort level or baseline comfort level Progressing        SAFETY ADULT     Patient will remain free of falls Progressing

## 2017-10-04 NOTE — PROGRESS NOTES
Spoke with Dr Chanelle Mooney  Lab results from STAT CBC and BMP reviewed  Informed Dr Chanelle Mooney of patient's bladder scan result  Patient voided 100 ml at this time and continues to be febrile with a Temp of 101 7  Will continue to monitor patient  No further orders noted at this time  Call light in reach

## 2017-10-04 NOTE — PROGRESS NOTES
Received patient from PACU @ 19:45  Temperature on admission to unit was 101 5  Patient settled into room, drinking water and juice  Re-check of temperature at 20:15 was 101 3  Call placed to [facility] resident on call  New order for a 1000ml NSS fluid bolus and to administer PRN tylenol for the fever  Will continue to monitor patient who appears in no acute distress   and mother at bedside  Call light in reach  stretcher

## 2017-10-04 NOTE — PROGRESS NOTES
Doctor Tommy Hester called to check on patient's progress  Notified Dr Tommy Hester of patient's fever and interventions ordered by resident  Patient complains that she feels "jittery, like my teeth should be chattering", skin is cool to the touch, fever remains @ 101 2  Reviewed and encouraged incentive spirometry, which patient gets to 1000  Doctor Tommy Hester spoke with patient via phone  Patient denies surgical incision related pain, however does have discomfort in her right shoulder, flank  Will continue to monitor patient   remains at bedside  Call light in reach

## 2017-10-05 ENCOUNTER — GENERIC CONVERSION - ENCOUNTER (OUTPATIENT)
Dept: OTHER | Facility: OTHER | Age: 47
End: 2017-10-05

## 2017-10-05 LAB — BACTERIA UR CULT: NORMAL

## 2017-10-06 ENCOUNTER — GENERIC CONVERSION - ENCOUNTER (OUTPATIENT)
Dept: OTHER | Facility: OTHER | Age: 47
End: 2017-10-06

## 2017-10-10 ENCOUNTER — GENERIC CONVERSION - ENCOUNTER (OUTPATIENT)
Dept: OTHER | Facility: OTHER | Age: 47
End: 2017-10-10

## 2017-10-19 ENCOUNTER — GENERIC CONVERSION - ENCOUNTER (OUTPATIENT)
Dept: OTHER | Facility: OTHER | Age: 47
End: 2017-10-19

## 2017-10-24 ENCOUNTER — TRANSCRIBE ORDERS (OUTPATIENT)
Dept: ADMINISTRATIVE | Facility: HOSPITAL | Age: 47
End: 2017-10-24

## 2017-10-24 ENCOUNTER — ALLSCRIPTS OFFICE VISIT (OUTPATIENT)
Dept: OTHER | Facility: OTHER | Age: 47
End: 2017-10-24

## 2017-10-24 DIAGNOSIS — Z12.39 ENCOUNTER FOR BREAST CANCER SCREENING OTHER THAN MAMMOGRAM: ICD-10-CM

## 2017-10-24 DIAGNOSIS — R92.2 BREAST DENSITY: Primary | ICD-10-CM

## 2017-10-25 NOTE — PROGRESS NOTES
Assessment  1  Encounter for breast cancer screening other than mammogram (V76 10) (Z12 39)   2  Encounter for screening mammogram for malignant neoplasm of breast (V76 12)   (Z12 31)   3  Fibrocystic breast disease, unspecified laterality (610 1) (N60 19)   4  Inconclusive mammogram due to dense breasts (793 82) (R92 2)   5  Increased risk of breast cancer (V15 89) (Z91 89)   6  Family history of breast cancer in female (V16 3) (Z80 3) : Mother    Plan  Encounter for breast cancer screening other than mammogram    · Follow-up visit in 6 months Evaluation and Treatment  Follow-up  Status: Hold For -  Scheduling  Requested for: 2017   Ordered; For: Encounter for breast cancer screening other than mammogram; Ordered By: Trevon Arrington Performed:  Due: 50MYE9419  Encounter for breast cancer screening other than mammogram, Inconclusive  mammogram due to dense breasts    · US BREAST SCREENING BILATERAL COMPLETE (ABUS); Status:Hold For -  Scheduling; Requested for:2018; Perform:[facility]; FSF:[id number];ATPOWHL;LQE:AOHERQYIJ for breast cancer screening other than mammogram, Inconclusive mammogram due to dense breasts; Ordered By:Prema Vincent; Discussion/Summary  49 yo female with dense breast tissue, fibrocystic changes and family history  She recently had a hysterectomy and states she is having less breast pain since and would like to defer her next MRI  There are no concerns on her exam today and her recent mammogram is benign  I discussed doing an ABUS as an alternative to the MRI  She is agreeable to this  I will make these arrangements for her and will see her again in six months or sooner should the need arise  Chief Complaint  Chief Complaint Free Text Note Form: Pt is here for her 6 month breast follow-up  new complaints at this time  imagin17 dang 3d scrn mammo (B1)  Sheyla Ochoa and Dr Kassandra Pinzon        History of Present Illness  Diagnosis and Staging: fibrocystic changes, dense breast tissue, family historyCuzick 44%    Interval History: had a hysterectomy; states she has less breast pain and fullness      Review of Systems  Complete Female ROS SurgOnc:   Constitutional: The patient denies new or recent history of general fatigue, no recent weight loss, no change in appetite  Eyes: No complaints of visual problems, no scleral icterus  ENT: no complaints of ear pain, no hoarseness, no difficulty swallowing,-- no tinnitus-- and-- no new masses in head, oral cavity, or neck  Cardiovascular: No complaints of chest pain, no palpitations, no ankle edema  Respiratory: No complaints of shortness of breath, no cough  Gastrointestinal: No complaints of jaundice, no bloody stools, no pale stools  Genitourinary: No complaints of dysuria, no hematuria, no nocturia, no frequent urination, no urethral discharge  Musculoskeletal: No complaints of weakness, paralysis, joint stiffness or arthralgias,  Integumentary: No complaints of rash, no new lesions  Neurological: No complaints of convulsions, no seizures, no dizziness  Hematologic/Lymphatic: No complaints of easy bruising  Active Problems  1  Encounter for breast cancer screening other than mammogram (V76 10) (Z12 39)   2  Encounter for screening mammogram for malignant neoplasm of breast (V76 12)   (Z12 31)   3  Enlarged lymph node (785 6) (R59 9)   4  Female pelvic pain (625 9) (R10 2)   5  Fibrocystic breast disease, unspecified laterality (610 1) (N60 19)   6  History of self breast exam   7  Inconclusive mammogram due to dense breasts (793 82) (R92 2)   8  Increased glucose level (790 29) (R73 09)   9  Increased risk of breast cancer (V15 89) (Z91 89)   10  Irregular menstrual cycle (626 4) (N92 6)   11  Leiomyoma of uterus (218 9) (D25 9)   12  Pap smear, as part of routine gynecological examination (V76 2) (Z01 419)   13  Post-operative state (V45 89) (Z98 890)   14  Simple ovarian cyst (620 2) (N83 209)   15   Yeast infection (112  9) (B37 9)    Past Medical History  1  History of Abnormal findings on diagnostic imaging of breast (793 89) (R92 8)   2  History of Abnormal weight gain (783 1) (R63 5)   3  History of Age At First Period 13 Years Old (Menarche)   4  History of Bloating (787 3) (R14 0)   5  History of Currently Wearing Eyeglasses   6  History of  0 (V49 89)   7  History of headache (V13 89) (Z87 898)   8  History of heartburn (V12 79) (Z87 898)   9  History of migraine (V12 49) (Z86 69)   10  History of ovarian cyst (V13 29) (Z87 42)   11  History of psoriasis (V13 3) (Z87 2)   12  History of Intolerance To Milk Products   13  History of Lichen sclerosus et atrophicus (701 0) (L90 0)   14  History of Lichen simplex chronicus (698 3) (L28 0)   15  History of Never Pregnant   16  History of RLQ abdominal pain (789 03) (R10 31)    Surgical History  1  History of Biopsy Breast Open   · 09   2  History of Biopsy Breast Percutaneous Needle Core   · 09   3  History of Breast Surgery Reduction Procedure   ·    4  History of Corneal LASIK Bilateral   5  History of Cystoscopy With Ureteroscopy   ·    6  History of Foot Surgery   ·  bunionectomy   7  History of Knee Surgery Right   8  History of Salpingectomy Unilateral Left Side   9  History of Salpingectomy Unilateral Right Side   10  History of Supracervical Hysterectomy Laparoscopic  Surgical History Reviewed: The surgical history was reviewed and updated today  Family History  Mother    1  Family history of breast cancer in female (V16 3) (Z80 3)   2  Family history of Hypoglycemia  Father    3  Family history of Pure Hypercholesterolemia  Sister    4  Family history of Depression  Maternal Grandmother    5  Family history of Lung Cancer (V16 1)   6  Family history of Malignant neoplasm of breast, unspecified laterality  Paternal Grandmother    9  Family history of Malignant neoplasm of breast, unspecified laterality  Maternal Grandfather    8  Family history of Prostate Cancer (V16 42)  Maternal Uncle    9  Family history of Prostate cancer (80) (C61)  Family History Reviewed: The family history was reviewed and updated today  Social History   · Being A Social Drinker   · Caffeine Use   · Denied: History of Drug Use   · Denied: History of    · Marital History - Currently    · Never A Smoker   · Druze Affiliation None   · Uses Safety Equipment - Seatbelts  Social History Reviewed: The social history was reviewed and updated today  The social history was reviewed and is unchanged  Current Meds   1  Mateus-Mag TABS; TAKE 1 TABLET DAILY; Therapy: (Recorded:2015) to Recorded   2  Clobetasol Propionate 0 05 % External Ointment; apply sparingly to affected area BID for   1 month, then QHS for 2 months, then twice a week for 3 months, then   once a week; Therapy: 60GES0847 to (Evaluate:2017)  Requested for: ; Last   Rx:2017 Ordered   3  Melatonin 3 MG Oral Tablet; TAKE AS DIRECTED; Therapy: (Recorded:2015) to Recorded   4  Multi-Vitamin Oral Tablet; Therapy: (Recorded:2015) to Recorded   5  Vitamin C 100 MG Oral Tablet; 500mg daily; Therapy: (Recorded:2015) to Recorded   6  Xanax 0 25 MG Oral Tablet; TAKE 1 TABLET 3 TIMES DAILY AS NEEDED; Therapy: (PRSVIPQ:25RRO7246) to Recorded  Medication List Reviewed: The medication list was reviewed and updated today  Allergies  1  Septra DS TABS   2  Percocet TABS   3  Sulfa Drugs  4  Latex   5  Nickel    Vitals  Vital Signs    Recorded: 94ZXT7623 08:59AM   Temperature 100 6 F   Heart Rate 88   Respiration 14   Systolic 738   Diastolic 78   Height 5 ft 2 5 in   Weight 129 lb    BMI Calculated 23 22   BSA Calculated 1 6   O2 Saturation 98     Physical Exam    Constitutional: General appearance: The Patient is well-developed, well-nourished female who appears her stated age in no acute distress  She is pleasant and talkative      Neuro: Grossly nonfocal  -- Orientation to person, place and time: Normal  -- Mood and affect: Normal     Lymphatic: no evidence of cervical adenopathy bilaterally  -- no evidence of axillary adenopathy bilaterally  Skin: Examination of Breast: Abnormal   Breast: Examination of both breasts revealed fibrocystic changes  Examination of the right breast revealed a breast scar, but-- no erythema,-- no swelling-- and-- no tenderness  Examination of the left breast revealed a breast scar, but-- no erythema,-- no swelling-- and-- no tenderness  Nipples appeared normal No discharge was noted from the nipples  No breast masses were palpable  Axillary nodes: no enlarged nodes  Results/Data  Diagnostic Studies Reviewed Surg Onc:   X-ray Review 9/29/17 bilateral 3d screening mammogram benign; category 3  Future Appointments    Date/Time Provider Specialty Site   10/26/2017 01:45 PM Alvina Henriquez DO Obstetrics/Gynecology [facility]     End of Encounter Meds  1  Mateus-Mag TABS; TAKE 1 TABLET DAILY; Therapy: (Recorded:11Mar2015) to Recorded   2  Melatonin 3 MG Oral Tablet; TAKE AS DIRECTED; Therapy: (Recorded:11Mar2015) to Recorded   3  Multi-Vitamin Oral Tablet; Therapy: (Recorded:11Mar2015) to Recorded   4  Vitamin C 100 MG Oral Tablet; 500mg daily; Therapy: (Recorded:11Mar2015) to Recorded  5  Clobetasol Propionate 0 05 % External Ointment; apply sparingly to affected area BID for   1 month, then QHS for 2 months, then twice a week for 3 months, then   once a week; Therapy: 53FZJ1628 to (Evaluate:16Oct2017)  Requested for: 86HEG4492; Last   Rx:19Jan2017 Ordered  6  Xanax 0 25 MG Oral Tablet (ALPRAZolam); TAKE 1 TABLET 3 TIMES DAILY AS NEEDED;    Therapy: (Stephanie Hess) to Recorded    Signatures   Electronically signed by : BUDDY Valera ; Oct 24 2017  9:39AM EST                       (Author)

## 2017-10-26 ENCOUNTER — GENERIC CONVERSION - ENCOUNTER (OUTPATIENT)
Dept: OTHER | Facility: OTHER | Age: 47
End: 2017-10-26

## 2017-10-27 ENCOUNTER — GENERIC CONVERSION - ENCOUNTER (OUTPATIENT)
Dept: OTHER | Facility: OTHER | Age: 47
End: 2017-10-27

## 2017-12-19 ENCOUNTER — GENERIC CONVERSION - ENCOUNTER (OUTPATIENT)
Dept: OTHER | Facility: OTHER | Age: 47
End: 2017-12-19

## 2018-01-11 NOTE — MISCELLANEOUS
Message   Recorded as Task   Date: 03/30/2016 09:16 AM, Created By: Siva Gaytan   Task Name: Follow Up   Assigned To: Va Jeong   Regarding Patient: Josseline Torres, Status: In Progress   Comment:    Va Jeong - 30 Mar 2016 9:16 AM     TASK CREATED  pt knows that you want her to have a repeat U/S in 3 months, but she said in the past, Benny Shannon had talked about bc pill to help with the cysts    pt was on Loestrin Fe along time ago and she feels that she may want to try this again  Bobbe Aquas your thoughts? Princess Gonzalez - 31 Mar 2016 11:15 AM     TASK REPLIED TO: Previously Assigned To Princess Gonzalez  OCs do help but I would prefer to avoid them if possible because of her breast CA risk and age, this cyst appears to be resolving  We will have to keep an eye on her fibroids, the ocs will not help them   Va Jeong - 31 Mar 2016 11:33 AM     TASK IN PROGRESS        Active Problems    1  Encounter for routine gynecological examination (V72 31) (Z01 419)   2  Fibrocystic breast disease, unspecified laterality (610 1) (N60 19)   3  Increased risk of breast cancer (V15 89) (Z91 89)   4  Leiomyoma of uterus (218 9) (D25 9)   5  Lichen sclerosus et atrophicus (701 0) (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 0)   6  Lichen simplex chronicus (698 3) (L28 0)    Current Meds   1  Mateus-Mag TABS; TAKE 1 TABLET DAILY; Therapy: (Recorded:11Mar2015) to Recorded   2  Clobetasol Propionate 0 05 % External Ointment; apply sparingly to affected area BID for   1 month, then QHS for 2 months, then twice a week for 3 months, then   once a week; Therapy: 92ZZE6574 to (Evaluate:82Yav2186)  Requested for: 52ZGF8159; Last   Rx:20Ltu9607 Ordered   3  Melatonin 3 MG Oral Tablet; TAKE AS DIRECTED; Therapy: (Recorded:11Mar2015) to Recorded   4  Meloxicam 15 MG Oral Tablet; Therapy: 48PLG7318 to Recorded   5  Multi-Vitamin Oral Tablet; Therapy: (Recorded:11Mar2015) to Recorded   6  Vitamin C 100 MG Oral Tablet; 500mg daily;    Therapy: (Recorded:11Mar2015) to

## 2018-01-12 VITALS
SYSTOLIC BLOOD PRESSURE: 102 MMHG | DIASTOLIC BLOOD PRESSURE: 66 MMHG | HEIGHT: 63 IN | WEIGHT: 133.13 LBS | BODY MASS INDEX: 23.59 KG/M2

## 2018-01-12 NOTE — MISCELLANEOUS
Message   Recorded as Task   Date: 07/05/2017 09:48 AM, Created By: Lawson Habermann   Task Name: Go to Result   Assigned To: Lawson Habermann   Regarding Patient: Danuta Avilez, Status: In Progress   Comment:    Lawson Habermann - 05 Jul 2017 9:48 AM     TASK CREATED  endometrial bx shows benign endometrium and also an endometrial polyp, we had a discussion regarding hysterectomy at her last visit due to large fibroids, if she has any questions, let me know and I can call her   Jaret Trammell - 05 Jul 2017 3:02 PM     TASK IN [facility] [address] - 05 Jul 2017 3:22 PM     TASK REPLIED TO: Previously Assigned To [facility]  pt scheduled a surgery discussion with you and with the cc    she is sure of her decision           Active Problems    1  Encounter for screening mammogram for malignant neoplasm of breast (V76 12)   (Z12 31)   2  Enlarged lymph node (785 6) (R59 9)   3  Female pelvic pain (625 9) (R10 2)   4  Fibrocystic breast disease, unspecified laterality (610 1) (N60 19)   5  History of self breast exam   6  Inconclusive mammogram due to dense breasts (793 82) (R92 2)   7  Increased glucose level (790 29) (R73 09)   8  Increased risk of breast cancer (V15 89) (Z91 89)   9  Irregular menstrual cycle (626 4) (N92 6)   10  Leiomyoma of uterus (218 9) (D25 9)   11  Pap smear, as part of routine gynecological examination (V76 2) (Z01 419)   12  Simple ovarian cyst (620 2) (N83 209)    Current Meds   1  Mateus-Mag TABS; TAKE 1 TABLET DAILY; Therapy: (Recorded:11Mar2015) to Recorded   2  Dasia 0 35 MG Oral Tablet; Take 1 tablet daily; Therapy: 65CAF7288 to (Last Rx:19Jan2017)  Requested for: 23SMW4595 Ordered   3  Clobetasol Propionate 0 05 % External Ointment; apply sparingly to affected area BID for   1 month, then QHS for 2 months, then twice a week for 3 months, then   once a week; Therapy: 10AVE7805 to (Evaluate:16Oct2017)  Requested for: 37NOV9699; Last   Rx:19Jan2017 Ordered   4   Evening Primrose Oil 1000 MG Oral Capsule; TAKE 1 CAPSULE 3 times daily; Therapy: 28KVM5664 to (Evaluate:09Nov2016); Last Rx:10Oct2016 Ordered   5  Melatonin 3 MG Oral Tablet; TAKE AS DIRECTED; Therapy: (Recorded:11Mar2015) to Recorded   6  Multi-Vitamin Oral Tablet; Therapy: (Recorded:11Mar2015) to Recorded   7  Vitamin C 100 MG Oral Tablet; 500mg daily; Therapy: (Recorded:11Mar2015) to Recorded   8  Xanax 0 25 MG Oral Tablet (ALPRAZolam); TAKE 1 TABLET 3 TIMES DAILY AS   NEEDED; Therapy: (Recorded:27Jun2012) to Recorded    Allergies    1  Septra DS TABS   2  Percocet TABS    3  Latex   4   Nickel    Signatures   Electronically signed by : Ely Gilbert, ; Jul 5 2017  3:22PM EST                       (Author)

## 2018-01-12 NOTE — MISCELLANEOUS
Message   Recorded as Task   Date: 10/05/2017 12:48 PM, Created By: Michael Hayward   Task Name: Care Coordination   Assigned To: Va Jeong   Regarding Patient: Yandel Burnett, Status: Active   CommentRosemary Peoples - 05 Oct 2017 12:48 PM     TASK CREATED  FYI- she said that she feels much better since being at home    no fevers    Kortney Darrin - 05 Oct 2017 2:00 PM     TASK REPLIED TO: Previously Assigned To Namanmika Clifford  thanks        Active Problems    1  Encounter for screening mammogram for malignant neoplasm of breast (V76 12)   (Z12 31)   2  Enlarged lymph node (785 6) (R59 9)   3  Female pelvic pain (625 9) (R10 2)   4  Fibrocystic breast disease, unspecified laterality (610 1) (N60 19)   5  History of self breast exam   6  Inconclusive mammogram due to dense breasts (793 82) (R92 2)   7  Increased glucose level (790 29) (R73 09)   8  Increased risk of breast cancer (V15 89) (Z91 89)   9  Irregular menstrual cycle (626 4) (N92 6)   10  Leiomyoma of uterus (218 9) (D25 9)   11  Pap smear, as part of routine gynecological examination (V76 2) (Z01 419)   12  Simple ovarian cyst (620 2) (N83 209)    Current Meds   1  Mateus-Mag TABS; TAKE 1 TABLET DAILY; Therapy: (Recorded:11Mar2015) to Recorded   2  Dasia 0 35 MG Oral Tablet; Take 1 tablet daily; Therapy: 33JMB9016 to (Last Rx:19Jan2017)  Requested for: 76JFD2221 Ordered   3  Clobetasol Propionate 0 05 % External Ointment; apply sparingly to affected area BID for   1 month, then QHS for 2 months, then twice a week for 3 months, then   once a week; Therapy: 22ERQ4599 to (Evaluate:16Oct2017)  Requested for: 60VAQ5180; Last   Rx:19Jan2017 Ordered   4  Evening Primrose Oil 1000 MG Oral Capsule; TAKE 1 CAPSULE 3 times daily; Therapy: 09BMR1809 to (Evaluate:09Nov2016); Last Rx:10Oct2016 Ordered   5  Melatonin 3 MG Oral Tablet; TAKE AS DIRECTED; Therapy: (Recorded:11Mar2015) to Recorded   6  Multi-Vitamin Oral Tablet;    Therapy: (Recorded:11Mar2015) to Recorded   7  Vitamin C 100 MG Oral Tablet; 500mg daily; Therapy: (Recorded:11Mar2015) to Recorded   8  Xanax 0 25 MG Oral Tablet (ALPRAZolam); TAKE 1 TABLET 3 TIMES DAILY AS   NEEDED; Therapy: (Recorded:27Jun2012) to Recorded    Allergies    1  Septra DS TABS   2  Percocet TABS   3  Sulfa Drugs    4  Latex   5   Nickel    Signatures   Electronically signed by : Janie Carlson, ; Oct  5 2017  2:35PM EST                       (Author)

## 2018-01-12 NOTE — MISCELLANEOUS
Message   Recorded as Task   Date: 10/10/2017 06:31 AM, Created By: Michelet Ramos   Task Name: Go to Result   Assigned To: Michelet Ramos   Regarding Patient: Elke Nunn, Status: In Progress   Comment:    Michelet Ramos - 10 Oct 2017 6:31 AM     TASK CREATED  please let pt know her hysterectomy pathology is benign, does show adenomyosis and possible endometriosis in addition to her fibroids, how is she feeling? Va Jeong - 10 Oct 2017 8:49 AM     TASK IN PROGRESS   Va Jeong - 10 Oct 2017 9:22 AM     TASK REPLIED TO: Previously Assigned To [facility]  pt is feeling good    she is passing her bowels normally now           Active Problems    1  Encounter for screening mammogram for malignant neoplasm of breast (V76 12)   (Z12 31)   2  Enlarged lymph node (785 6) (R59 9)   3  Female pelvic pain (625 9) (R10 2)   4  Fibrocystic breast disease, unspecified laterality (610 1) (N60 19)   5  History of self breast exam   6  Inconclusive mammogram due to dense breasts (793 82) (R92 2)   7  Increased glucose level (790 29) (R73 09)   8  Increased risk of breast cancer (V15 89) (Z91 89)   9  Irregular menstrual cycle (626 4) (N92 6)   10  Leiomyoma of uterus (218 9) (D25 9)   11  Pap smear, as part of routine gynecological examination (V76 2) (Z01 419)   12  Simple ovarian cyst (620 2) (N83 209)   13  Yeast infection (112 9) (B37 9)    Current Meds   1  Mateus-Mag TABS; TAKE 1 TABLET DAILY; Therapy: (Recorded:11Mar2015) to Recorded   2  Dasia 0 35 MG Oral Tablet; Take 1 tablet daily; Therapy: 68KRE8073 to (Last Rx:19Jan2017)  Requested for: 82YAJ3766 Ordered   3  Clobetasol Propionate 0 05 % External Ointment; apply sparingly to affected area BID for   1 month, then QHS for 2 months, then twice a week for 3 months, then   once a week; Therapy: 19QCJ0917 to (Evaluate:16Oct2017)  Requested for: 99XAW2011; Last   Rx:19Jan2017 Ordered   4   Evening Primrose Oil 1000 MG Oral Capsule; TAKE 1 CAPSULE 3 times daily; Therapy: 47OXI0050 to (Evaluate:09Nov2016); Last Rx:10Oct2016 Ordered   5  Melatonin 3 MG Oral Tablet; TAKE AS DIRECTED; Therapy: (Recorded:11Mar2015) to Recorded   6  Multi-Vitamin Oral Tablet; Therapy: (Recorded:11Mar2015) to Recorded   7  Vitamin C 100 MG Oral Tablet; 500mg daily; Therapy: (Recorded:11Mar2015) to Recorded   8  Xanax 0 25 MG Oral Tablet (ALPRAZolam); TAKE 1 TABLET 3 TIMES DAILY AS   NEEDED; Therapy: (Recorded:27Jun2012) to Recorded    Allergies    1  Septra DS TABS   2  Percocet TABS   3  Sulfa Drugs    4  Latex   5   Nickel    Signatures   Electronically signed by : Hien Amaro, ; Oct 10 2017  9:22AM EST                       (Author)

## 2018-01-12 NOTE — MISCELLANEOUS
Message   Recorded as Task   Date: 05/19/2017 11:23 AM, Created By: Anniece Koyanagi   Task Name: Review Note   Assigned To: [facility]   Regarding Patient: Mony Amador, Status: Active   Comment:    Anniece Koyanagi - 19 May 2017 11:23 AM     TASK CREATED  her fibroids are all larger, should  come in to discuss treatment   Va Jeong - 19 May 2017 11:57 AM     TASK EDITED  pt scheduled appt           Active Problems    1  Encounter for screening mammogram for malignant neoplasm of breast (V76 12)   (Z12 31)   2  Enlarged lymph node (785 6) (R59 9)   3  Female pelvic pain (625 9) (R10 2)   4  Fibrocystic breast disease, unspecified laterality (610 1) (N60 19)   5  History of self breast exam   6  Inconclusive mammogram due to dense breasts (793 82) (R92 2)   7  Increased risk of breast cancer (V15 89) (Z91 89)   8  Leiomyoma of uterus (218 9) (D25 9)   9  Pap smear, as part of routine gynecological examination (V76 2) (Z01 419)   10  Simple ovarian cyst (620 2) (N83 209)    Current Meds   1  Mateus-Mag TABS; TAKE 1 TABLET DAILY; Therapy: (Recorded:11Mar2015) to Recorded   2  Dasia 0 35 MG Oral Tablet; Take 1 tablet daily; Therapy: 15CJW2453 to (Last Rx:19Jan2017)  Requested for: 27PSB9946 Ordered   3  Clobetasol Propionate 0 05 % External Ointment; apply sparingly to affected area BID for   1 month, then QHS for 2 months, then twice a week for 3 months, then   once a week; Therapy: 37CVW6628 to (Evaluate:16Oct2017)  Requested for: 62NYC8430; Last   Rx:19Jan2017 Ordered   4  Evening Primrose Oil 1000 MG Oral Capsule; TAKE 1 CAPSULE 3 times daily; Therapy: 03HMH5470 to (Evaluate:09Nov2016); Last Rx:10Oct2016 Ordered   5  Melatonin 3 MG Oral Tablet; TAKE AS DIRECTED; Therapy: (Recorded:11Mar2015) to Recorded   6  Multi-Vitamin Oral Tablet; Therapy: (Recorded:11Mar2015) to Recorded   7  Naproxen Sodium 550 MG Oral Tablet (Anaprox DS); TAKE 1 TABLET EVERY 12   HOURS as needed;    Therapy: 40YXK0988 to (Evaluate:28Oct2016)  Requested for: 30Jun2016; Last   Rx:30Jun2016 Ordered   8  Vitamin C 100 MG Oral Tablet; 500mg daily; Therapy: (Recorded:11Mar2015) to Recorded   9  Xanax 0 25 MG Oral Tablet (ALPRAZolam); TAKE 1 TABLET 3 TIMES DAILY AS   NEEDED; Therapy: (Recorded:27Jun2012) to Recorded    Allergies    1  Septra DS TABS   2  Percocet TABS    3  Latex   4   Nickel    Signatures   Electronically signed by : Usha Allison, ; May 19 2017 11:58AM EST                       (Author)

## 2018-01-13 VITALS
TEMPERATURE: 100.2 F | BODY MASS INDEX: 23.74 KG/M2 | WEIGHT: 134 LBS | HEART RATE: 89 BPM | OXYGEN SATURATION: 98 % | DIASTOLIC BLOOD PRESSURE: 70 MMHG | RESPIRATION RATE: 13 BRPM | HEIGHT: 63 IN | SYSTOLIC BLOOD PRESSURE: 114 MMHG

## 2018-01-13 NOTE — MISCELLANEOUS
Message   Recorded as Task   Date: 03/13/2017 08:41 AM, Created By: Santana Moffett   Task Name: Review Note   Assigned To: Vincent Colby   Regarding Patient: Marsha Lindsay, Status: In Progress   Comment:    Santana Moffett - 13 Mar 2017 8:41 AM     TASK CREATED  the US shows stable fibroids, her ovarian cyst went from 5 5cm to 4 4cm, would recheck in 8-12 weeks and call if pain   Va Jeong - 13 Mar 2017 1:41 PM     TASK IN PROGRESS   Enrique Barakat - 15 Mar 2017 9:57 AM     TASK EDITED  pt will have follow-up U/S here in the office           Active Problems    1  Enlarged lymph node (785 6) (R59 9)   2  Female pelvic pain (625 9) (R10 2)   3  Fibrocystic breast disease, unspecified laterality (610 1) (N60 19)   4  History of self breast exam   5  Inconclusive mammogram due to dense breasts (793 82) (R92 2)   6  Increased risk of breast cancer (V15 89) (Z91 89)   7  Leiomyoma of uterus (218 9) (D25 9)   8  Pap smear, as part of routine gynecological examination (V76 2) (Z01 419)   9  Simple ovarian cyst (620 2) (N83 209)    Current Meds   1  Mateus-Mag TABS; TAKE 1 TABLET DAILY; Therapy: (Recorded:11Mar2015) to Recorded   2  Dasia 0 35 MG Oral Tablet; Take 1 tablet daily; Therapy: 28WPD6085 to (Last Rx:19Jan2017)  Requested for: 25CJX2694 Ordered   3  Clobetasol Propionate 0 05 % External Ointment; apply sparingly to affected area BID for   1 month, then QHS for 2 months, then twice a week for 3 months, then   once a week; Therapy: 45EZM8691 to (Evaluate:16Oct2017)  Requested for: 75IAV3255; Last   Rx:19Jan2017 Ordered   4  Evening Primrose Oil 1000 MG Oral Capsule; TAKE 1 CAPSULE 3 times daily; Therapy: 03WMK6385 to (Evaluate:09Nov2016); Last Rx:10Oct2016 Ordered   5  Melatonin 3 MG Oral Tablet; TAKE AS DIRECTED; Therapy: (Recorded:11Mar2015) to Recorded   6  Multi-Vitamin Oral Tablet; Therapy: (Recorded:05Kay6098) to Recorded   7   Naproxen Sodium 550 MG Oral Tablet (Anaprox DS); TAKE 1 TABLET EVERY 12   HOURS as needed; Therapy: 23UUW6805 to (Evaluate:28Oct2016)  Requested for: 57PUV6932; Last   Rx:30Jun2016 Ordered   8  Vitamin C 100 MG Oral Tablet; 500mg daily; Therapy: (Recorded:11Mar2015) to Recorded   9  Xanax 0 25 MG Oral Tablet (ALPRAZolam); TAKE 1 TABLET 3 TIMES DAILY AS   NEEDED; Therapy: (Recorded:27Jun2012) to Recorded    Allergies    1  Septra DS TABS   2  Percocet TABS    3  Latex   4   Nickel    Signatures   Electronically signed by : Michele Vides, ; Mar 15 2017  9:57AM EST                       (Author)

## 2018-01-14 VITALS
HEIGHT: 63 IN | OXYGEN SATURATION: 98 % | TEMPERATURE: 100.6 F | SYSTOLIC BLOOD PRESSURE: 118 MMHG | WEIGHT: 129 LBS | RESPIRATION RATE: 14 BRPM | BODY MASS INDEX: 22.86 KG/M2 | DIASTOLIC BLOOD PRESSURE: 78 MMHG | HEART RATE: 88 BPM

## 2018-01-14 VITALS
DIASTOLIC BLOOD PRESSURE: 64 MMHG | BODY MASS INDEX: 23.21 KG/M2 | WEIGHT: 131 LBS | HEIGHT: 63 IN | SYSTOLIC BLOOD PRESSURE: 110 MMHG

## 2018-01-14 VITALS
HEIGHT: 63 IN | WEIGHT: 133 LBS | BODY MASS INDEX: 23.57 KG/M2 | SYSTOLIC BLOOD PRESSURE: 124 MMHG | DIASTOLIC BLOOD PRESSURE: 70 MMHG

## 2018-01-14 NOTE — MISCELLANEOUS
Message   Recorded as Task   Date: 10/06/2017 07:17 AM, Created By: Milana Bush   Task Name: Go to Result   Assigned To: Paresh Reinoso   Regarding Patient: Erica Curtis, Status: In Progress   Comment:    Milana Bush - 06 Oct 2017 7:17 AM     TASK CREATED  urine culture is negative, if she is feeling better, she does not need to finish the antibiotics, if she is still having dysuria, then take the rest of them   Va Jeong - 06 Oct 2017 8:19 AM     TASK IN [facility] - 06 Oct 2017 8:26 AM     TASK REPLIED TO: Previously Assigned To Paresh Reinoso  She is feeling better but feels like she has a yeast infection    due to the surgery can we order a diflucan? Milana Bush - 06 Oct 2017 8:30 AM     TASK REPLIED TO: Previously Assigned To Milana Bush  yes   Kai Niharika - 06 Oct 2017 8:55 AM     TASK EDITED  script ordered and sent to dr for approval        Active Problems    1  Encounter for screening mammogram for malignant neoplasm of breast (V76 12)   (Z12 31)   2  Enlarged lymph node (785 6) (R59 9)   3  Female pelvic pain (625 9) (R10 2)   4  Fibrocystic breast disease, unspecified laterality (610 1) (N60 19)   5  History of self breast exam   6  Inconclusive mammogram due to dense breasts (793 82) (R92 2)   7  Increased glucose level (790 29) (R73 09)   8  Increased risk of breast cancer (V15 89) (Z91 89)   9  Irregular menstrual cycle (626 4) (N92 6)   10  Leiomyoma of uterus (218 9) (D25 9)   11  Pap smear, as part of routine gynecological examination (V76 2) (Z01 419)   12  Simple ovarian cyst (620 2) (N83 209)   13  Yeast infection (112 9) (B37 9)    Current Meds   1  Mateus-Mag TABS; TAKE 1 TABLET DAILY; Therapy: (Recorded:11Mar2015) to Recorded   2  Dasia 0 35 MG Oral Tablet; Take 1 tablet daily; Therapy: 20IYA3982 to (Last Rx:19Jan2017)  Requested for: 28NFP5857 Ordered   3   Clobetasol Propionate 0 05 % External Ointment; apply sparingly to affected area BID for   1 month, then QHS for 2 months, then twice a week for 3 months, then   once a week; Therapy: 91PEJ0486 to (Evaluate:16Oct2017)  Requested for: 06MSN3552; Last   Rx:19Jan2017 Ordered   4  Evening Primrose Oil 1000 MG Oral Capsule; TAKE 1 CAPSULE 3 times daily; Therapy: 28TVO0463 to (Evaluate:09Nov2016); Last Rx:10Oct2016 Ordered   5  Melatonin 3 MG Oral Tablet; TAKE AS DIRECTED; Therapy: (Recorded:11Mar2015) to Recorded   6  Multi-Vitamin Oral Tablet; Therapy: (Recorded:11Mar2015) to Recorded   7  Vitamin C 100 MG Oral Tablet; 500mg daily; Therapy: (Recorded:11Mar2015) to Recorded   8  Xanax 0 25 MG Oral Tablet (ALPRAZolam); TAKE 1 TABLET 3 TIMES DAILY AS   NEEDED; Therapy: (Recorded:27Jun2012) to Recorded    Allergies    1  Septra DS TABS   2  Percocet TABS   3  Sulfa Drugs    4  Latex   5   Nickel    Signatures   Electronically signed by : Sarbjit Barr, ; Oct  6 2017  8:56AM EST                       (Author)

## 2018-01-15 NOTE — MISCELLANEOUS
Message   Recorded as Task   Date: 06/28/2017 12:31 PM, Created By: Shanika Vincent   Task Name: Care Coordination   Assigned To: Va Jeong   Regarding Patient: Renée Gutierrez, Status: Active   Comment:    Va Jeong - 28 Jun 2017 12:32 PM     TASK CREATED  I am tasking you her labs that she had done with her health screening at work    Let me know if you want to order any other labs for her   ?? Sunny Nelson - 28 Jun 2017 7:50 PM     TASK REPLIED TO: Previously Assigned To Sunny Nelson  ok, thanks        Active Problems    1  Encounter for screening mammogram for malignant neoplasm of breast (V76 12)   (Z12 31)   2  Enlarged lymph node (785 6) (R59 9)   3  Female pelvic pain (625 9) (R10 2)   4  Fibrocystic breast disease, unspecified laterality (610 1) (N60 19)   5  History of self breast exam   6  Inconclusive mammogram due to dense breasts (793 82) (R92 2)   7  Increased risk of breast cancer (V15 89) (Z91 89)   8  Irregular menstrual cycle (626 4) (N92 6)   9  Leiomyoma of uterus (218 9) (D25 9)   10  Pap smear, as part of routine gynecological examination (V76 2) (Z01 419)   11  Simple ovarian cyst (620 2) (N83 209)    Current Meds   1  Mateus-Mag TABS; TAKE 1 TABLET DAILY; Therapy: (Recorded:11Mar2015) to Recorded   2  Dasia 0 35 MG Oral Tablet; Take 1 tablet daily; Therapy: 77OIX7830 to (Last Rx:19Jan2017)  Requested for: 03NPO8452 Ordered   3  Clobetasol Propionate 0 05 % External Ointment; apply sparingly to affected area BID for   1 month, then QHS for 2 months, then twice a week for 3 months, then   once a week; Therapy: 80POB2404 to (Evaluate:16Oct2017)  Requested for: 15UFD1644; Last   Rx:19Jan2017 Ordered   4  Evening Primrose Oil 1000 MG Oral Capsule; TAKE 1 CAPSULE 3 times daily; Therapy: 37OTA2841 to (Evaluate:09Nov2016); Last Rx:10Oct2016 Ordered   5  Melatonin 3 MG Oral Tablet; TAKE AS DIRECTED; Therapy: (Recorded:11Mar2015) to Recorded   6   Multi-Vitamin Oral Tablet; Therapy: (Recorded:11Mar2015) to Recorded   7  Vitamin C 100 MG Oral Tablet; 500mg daily; Therapy: (Recorded:11Mar2015) to Recorded   8  Xanax 0 25 MG Oral Tablet (ALPRAZolam); TAKE 1 TABLET 3 TIMES DAILY AS   NEEDED; Therapy: (Recorded:27Jun2012) to Recorded    Allergies    1  Septra DS TABS   2  Percocet TABS    3  Latex   4   Nickel    Signatures   Electronically signed by : Katlyn Cook, ; Jun 29 2017  7:54AM EST                       (Author)

## 2018-01-15 NOTE — MISCELLANEOUS
Message   Date: 27 Oct 2017 9:00 AM EST, Recorded By: Skylar Ruth For: Kenneth Worley: Christy Galvez, Self   Phone: [phone] (Home), [phone] x,,,,, (Work)   Reason: Other   Patient called to ask when she can have a tub bath  Advised wait 1 full month after date of surgery  Active Problems    1  Encounter for breast cancer screening other than mammogram (V76 10) (Z12 39)   2  Encounter for screening mammogram for malignant neoplasm of breast (V76 12)   (Z12 31)   3  Enlarged lymph node (785 6) (R59 9)   4  Female pelvic pain (625 9) (R10 2)   5  Fibrocystic breast disease, unspecified laterality (610 1) (N60 19)   6  History of self breast exam   7  Inconclusive mammogram due to dense breasts (793 82) (R92 2)   8  Increased glucose level (790 29) (R73 09)   9  Increased risk of breast cancer (V15 89) (Z91 89)   10  Irregular menstrual cycle (626 4) (N92 6)   11  Leiomyoma of uterus (218 9) (D25 9)   12  Pap smear, as part of routine gynecological examination (V76 2) (Z01 419)   13  Post-operative state (V45 89) (Z98 890)   14  Simple ovarian cyst (620 2) (N83 209)   15  Yeast infection (112 9) (B37 9)    Current Meds   1  Mateus-Mag TABS; TAKE 1 TABLET DAILY; Therapy: (Recorded:11Mar2015) to Recorded   2  Clobetasol Propionate 0 05 % External Ointment; apply sparingly to affected area BID for   1 month, then QHS for 2 months, then twice a week for 3 months, then   once a week; Therapy: 18AKT2473 to (Evaluate:16Oct2017)  Requested for: 72CUF7386; Last   Rx:19Jan2017 Ordered   3  Melatonin 3 MG Oral Tablet; TAKE AS DIRECTED; Therapy: (Recorded:11Mar2015) to Recorded   4  Multi-Vitamin Oral Tablet; Therapy: (Recorded:11Mar2015) to Recorded   5  Vitamin C 100 MG Oral Tablet; 500mg daily; Therapy: (Recorded:11Mar2015) to Recorded   6  Xanax 0 25 MG Oral Tablet (ALPRAZolam); TAKE 1 TABLET 3 TIMES DAILY AS   NEEDED;    Therapy: ([place]) to Recorded    Allergies    1  Septra DS TABS   2  Percocet TABS   3  Sulfa Drugs    4  Latex   5   Nickel    Signatures   Electronically signed by : Dann Spurling, ; Oct 27 2017  9:01AM EST                       (Author)

## 2018-01-16 NOTE — MISCELLANEOUS
Message   Date: 21 Mar 2016 1:40 PM EST, Recorded By: Ed Torres For: Amber Rossi, Self   Phone: [phone] (Home), [phone] x,,,,, (Work)   Reason: Medical Complaint   pt had a ruptured ovarian cyst  she had experience with this before    pt will come in for an U/S to check for other cysts and to check her fibroids           Active Problems    1  Encounter for routine gynecological examination (V72 31) (Z01 419)   2  Female pelvic pain (625 9) (R10 2)   3  Fibrocystic breast disease, unspecified laterality (610 1) (N60 19)   4  Increased risk of breast cancer (V15 89) (Z91 89)   5  Leiomyoma of uterus (218 9) (D25 9)   6  Lichen sclerosus et atrophicus (701 0) (L90 0)   7  Lichen simplex chronicus (698 3) (L28 0)   8  Ovarian cyst (620 2) (N83 20)   9  RLQ abdominal pain (789 03) (R10 31)    Current Meds   1  Mateus-Mag TABS; TAKE 1 TABLET DAILY; Therapy: (Recorded:11Mar2015) to Recorded   2  Clobetasol Propionate 0 05 % External Ointment; apply sparingly to affected area BID for   1 month, then QHS for 2 months, then twice a week for 3 months, then   once a week; Therapy: 80REA7490 to (Evaluate:67Iod2445)  Requested for: 02DTM6959; Last   Rx:98Jvx2362 Ordered   3  Melatonin 3 MG Oral Tablet; TAKE AS DIRECTED; Therapy: (Recorded:11Mar2015) to Recorded   4  Meloxicam 15 MG Oral Tablet; Therapy: 30DLV0499 to Recorded   5  Multi-Vitamin Oral Tablet; Therapy: (Recorded:11Mar2015) to Recorded   6  Vitamin C 100 MG Oral Tablet; 500mg daily; Therapy: (Recorded:11Mar2015) to Recorded   7  Xanax 0 25 MG Oral Tablet (ALPRAZolam); TAKE 1 TABLET 3 TIMES DAILY AS   NEEDED; Therapy: (Recorded:27Jun2012) to Recorded    Allergies    1  Septra DS TABS   2  Percocet TABS    3  Latex   4   Nickel    Signatures   Electronically signed by : Vince Jaimes, ; Mar 21 2016  1:41PM EST                       (Author)

## 2018-01-17 NOTE — MISCELLANEOUS
Message   Recorded as Task   Date: 03/24/2016 02:43 PM, Created By: Sheyla Lindsey   Task Name: Follow Up   Assigned To: [facility] Patient: Nati Hester, Status: Active   Comment:   Sheyla Lindsey - 24 Mar 2016 2:43 PM    TASK CREATED  fibroids are stable, small left ovarian cyst, would repeat US in 3months   Pt informed, will schedule repeat appt  Active Problems   1  Encounter for routine gynecological examination (V72 31) (Z01 419)  2  Fibrocystic breast disease, unspecified laterality (610 1) (N60 19)  3  Increased risk of breast cancer (V15 89) (Z91 89)  4  Leiomyoma of uterus (218 9) (D25 9)  5  Lichen sclerosus et atrophicus (701 0) (L90 0)  6  Lichen simplex chronicus (698 3) (L28 0)    Current Meds  1  Mateus-Mag TABS; TAKE 1 TABLET DAILY; Therapy: (Recorded:11Mar2015) to Recorded  2  Clobetasol Propionate 0 05 % External Ointment; apply sparingly to affected area BID for   1 month, then QHS for 2 months, then twice a week for 3 months, then   once a week; Therapy: 25FKM2667 to (Evaluate:33Glx2367)  Requested for: 99RYF2294; Last   Rx:62Ixn2083 Ordered  3  Melatonin 3 MG Oral Tablet; TAKE AS DIRECTED; Therapy: (Recorded:11Mar2015) to Recorded  4  Meloxicam 15 MG Oral Tablet; Therapy: 60PXP8062 to Recorded  5  Multi-Vitamin Oral Tablet; Therapy: (Recorded:11Mar2015) to Recorded  6  Vitamin C 100 MG Oral Tablet; 500mg daily; Therapy: (Recorded:11Mar2015) to Recorded  7  Xanax 0 25 MG Oral Tablet (ALPRAZolam); TAKE 1 TABLET 3 TIMES DAILY AS   NEEDED; Therapy: (Recorded:27Jun2012) to Recorded    Allergies   1  Septra DS TABS  2  Percocet TABS   3  Latex  4   Nickel    Signatures   Electronically signed by : Kellie Whelan, ; Mar 25 2016 10:04AM EST                       (Author)

## 2018-01-22 VITALS
HEIGHT: 63 IN | DIASTOLIC BLOOD PRESSURE: 66 MMHG | BODY MASS INDEX: 22.77 KG/M2 | SYSTOLIC BLOOD PRESSURE: 110 MMHG | WEIGHT: 128.5 LBS

## 2018-01-22 VITALS
SYSTOLIC BLOOD PRESSURE: 120 MMHG | DIASTOLIC BLOOD PRESSURE: 78 MMHG | HEIGHT: 63 IN | BODY MASS INDEX: 23.06 KG/M2 | WEIGHT: 130.13 LBS

## 2018-01-23 NOTE — MISCELLANEOUS
Message   Recorded as Task   Date: 12/15/2017 11:52 AM, Created By: Sofy Pride   Task Name: Medical Complaint Callback   Assigned To: Sugar Mcneal   Regarding Patient: Stefany Harris, Status: In Progress   CommentTorykirill FarahMiddleburg - 15 Dec 2017 11:52 AM     TASK CREATED  Patient called  Had hysterectomy on 10/3/17  States has been fine, no issues  Now has noticed slight blood tinged discharge  Questions if this is normal   Has appointment in 4 weeks but would like your comments about this  Obey Harper - 18 Dec 2017 12:01 PM     TASK REPLIED TO: Previously Assigned To Obey Harper  it can be normal, she had a supracervical hysterectomy, some pts can have a small amount of spotting from their cervix especially since her ovaries are still working, should observe closely   Sofy Pride - 19 Dec 2017 8:17 AM     TASK IN PROGRESS   Patient informed  Will observe and discuss at upcoming appointment  Active Problems    1  Encounter for breast cancer screening other than mammogram (V76 10) (Z12 39)   2  Encounter for screening mammogram for malignant neoplasm of breast (V76 12)   (Z12 31)   3  Enlarged lymph node (785 6) (R59 9)   4  Female pelvic pain (625 9) (R10 2)   5  Fibrocystic breast disease, unspecified laterality (610 1) (N60 19)   6  History of self breast exam   7  Inconclusive mammogram due to dense breasts (793 82) (R92 2)   8  Increased glucose level (790 29) (R73 09)   9  Increased risk of breast cancer (V15 89) (Z91 89)   10  Irregular menstrual cycle (626 4) (N92 6)   11  Leiomyoma of uterus (218 9) (D25 9)   12  Pap smear, as part of routine gynecological examination (V76 2) (Z01 419)   13  Post-operative state (V45 89) (Z98 890)   14  Simple ovarian cyst (620 2) (N83 209)   15  Yeast infection (112 9) (B37 9)    Current Meds   1  Mateus-Mag TABS; TAKE 1 TABLET DAILY; Therapy: (Recorded:11Mar2015) to Recorded   2   Clobetasol Propionate 0 05 % External Ointment; apply sparingly to affected area BID for   1 month, then QHS for 2 months, then twice a week for 3 months, then   once a week; Therapy: 02XBC9036 to (Evaluate:16Oct2017)  Requested for: 31UPF8843; Last   Rx:19Jan2017 Ordered   3  Melatonin 3 MG Oral Tablet; TAKE AS DIRECTED; Therapy: (Recorded:11Mar2015) to Recorded   4  Multi-Vitamin Oral Tablet; Therapy: (Recorded:11Mar2015) to Recorded   5  Vitamin C 100 MG Oral Tablet; 500mg daily; Therapy: (Recorded:11Mar2015) to Recorded   6  Xanax 0 25 MG Oral Tablet (ALPRAZolam); TAKE 1 TABLET 3 TIMES DAILY AS   NEEDED; Therapy: (Recorded:27Jun2012) to Recorded    Allergies    1  Septra DS TABS   2  Percocet TABS   3  Sulfa Drugs    4  Latex   5   Nickel    Signatures   Electronically signed by : Jeanmarie Leos, ; Dec 19 2017 12:34PM EST                       (Author)

## 2018-03-02 RX ORDER — ACETAMINOPHEN AND CODEINE PHOSPHATE 120; 12 MG/5ML; MG/5ML
SOLUTION ORAL
Qty: 28 TABLET | Refills: 12 | OUTPATIENT
Start: 2018-03-02

## 2018-03-09 ENCOUNTER — OFFICE VISIT (OUTPATIENT)
Dept: OBGYN CLINIC | Facility: CLINIC | Age: 48
End: 2018-03-09
Payer: COMMERCIAL

## 2018-03-09 VITALS
SYSTOLIC BLOOD PRESSURE: 110 MMHG | DIASTOLIC BLOOD PRESSURE: 78 MMHG | BODY MASS INDEX: 23.57 KG/M2 | WEIGHT: 133 LBS | HEIGHT: 63 IN

## 2018-03-09 DIAGNOSIS — Z12.4 PAP SMEAR FOR CERVICAL CANCER SCREENING: ICD-10-CM

## 2018-03-09 DIAGNOSIS — Z12.31 ENCOUNTER FOR SCREENING MAMMOGRAM FOR BREAST CANCER: ICD-10-CM

## 2018-03-09 DIAGNOSIS — Z01.419 ENCOUNTER FOR ANNUAL ROUTINE GYNECOLOGICAL EXAMINATION: Primary | ICD-10-CM

## 2018-03-09 DIAGNOSIS — Z12.4 CERVICAL CANCER SCREENING: ICD-10-CM

## 2018-03-09 DIAGNOSIS — Z11.51 SCREENING FOR HPV (HUMAN PAPILLOMAVIRUS): ICD-10-CM

## 2018-03-09 DIAGNOSIS — Z11.51 SCREENING FOR HUMAN PAPILLOMAVIRUS (HPV): Primary | ICD-10-CM

## 2018-03-09 PROCEDURE — S0612 ANNUAL GYNECOLOGICAL EXAMINA: HCPCS | Performed by: OBSTETRICS & GYNECOLOGY

## 2018-03-09 RX ORDER — MELOXICAM 15 MG/1
TABLET ORAL
Refills: 2 | COMMUNITY
Start: 2018-03-02 | End: 2022-04-26

## 2018-03-09 NOTE — PROGRESS NOTES
Assessment/Plan:    Pap and HPV done today    Status post hysterectomy-she is doing very well and will continue to increase her exercise and call with any concerns    Elevated breast cancer risk-she follows with Dr Jadyn Mejia    No problem-specific Annabella Jeronimo notes found for this encounter  Diagnoses and all orders for this visit:    Encounter for annual routine gynecological examination    Encounter for screening mammogram for breast cancer    Cervical cancer screening    Screening for HPV (human papillomavirus)    Other orders  -     meloxicam (MOBIC) 15 mg tablet; TAKE 1 TABLET(S) EVERY DAY BY ORAL ROUTE AS NEEDED  Subjective:      Patient ID: Chelsi Pena is a 50 y o  female  Patient here for yearly, she is status post laparoscopic supracervical hysterectomy with bilateral salpingectomy  She has done very well and is very happy with her decision  She is still getting back to her previous level of exercise but otherwise has no complaints  She is due for a Pap today  She sees Dr Marina Uriostegui for dense breast tissue and an elevated breast cancer risk  Typically she has had a 3D mammogram and an MRI but she is going to have a 3D mammogram and ABUS this year  Gynecologic Exam         The following portions of the patient's history were reviewed and updated as appropriate: allergies, current medications, past family history, past medical history, past social history, past surgical history and problem list     Review of Systems   All other systems reviewed and are negative  Objective:      /78 (BP Location: Left arm, Patient Position: Sitting, Cuff Size: Standard)   Ht 5' 3" (1 6 m)   Wt 60 3 kg (133 lb)   LMP 09/09/2017 (Exact Date)   BMI 23 56 kg/m²          Physical Exam   Constitutional: She appears well-developed  Neck: No tracheal deviation present  No thyromegaly present  Cardiovascular: Normal rate and regular rhythm      Pulmonary/Chest: Effort normal and breath sounds normal  Right breast exhibits no inverted nipple, no mass, no nipple discharge, no skin change and no tenderness  Left breast exhibits no inverted nipple, no mass, no nipple discharge, no skin change and no tenderness  Breasts are symmetrical    Examined seated and supine   Abdominal: Soft  She exhibits no distension and no mass  There is no tenderness  Genitourinary: Rectum normal and vagina normal  No labial fusion  There is no rash, tenderness, lesion or injury on the right labia  There is no rash, tenderness, lesion or injury on the left labia  Cervix exhibits no motion tenderness, no discharge and no friability  Right adnexum displays no mass, no tenderness and no fullness  Left adnexum displays no mass, no tenderness and no fullness  Genitourinary Comments: Cervix is normal, uterus is surgically absent  No adnexal masses, very minimal tenderness on the right   Vitals reviewed

## 2018-03-13 LAB
CLINICAL INFO: NORMAL
CYTO CVX: NORMAL
DATE PREVIOUS BX: NORMAL
HPV E6+E7 MRNA CVX QL NAA+PROBE: NOT DETECTED
LMP START DATE: NORMAL
QUESTION/PROBLEM: NORMAL
SL AMB CONTAINER TYPE: NORMAL
SL AMB FINAL RESOLUTION: NORMAL
SL AMB PREV. PAP:: NORMAL
SL AMB REPORT STATUS: NORMAL
SPECIMEN SOURCE CVX/VAG CYTO: NORMAL

## 2018-03-14 ENCOUNTER — TELEPHONE (OUTPATIENT)
Dept: OBGYN CLINIC | Facility: CLINIC | Age: 48
End: 2018-03-14

## 2018-04-04 ENCOUNTER — HOSPITAL ENCOUNTER (OUTPATIENT)
Dept: ULTRASOUND IMAGING | Facility: CLINIC | Age: 48
Discharge: HOME | End: 2018-04-04
Payer: COMMERCIAL

## 2018-04-04 DIAGNOSIS — Z12.39 ENCOUNTER FOR BREAST CANCER SCREENING OTHER THAN MAMMOGRAM: ICD-10-CM

## 2018-04-04 DIAGNOSIS — R92.2 BREAST DENSITY: ICD-10-CM

## 2018-04-04 PROCEDURE — 76377 3D RENDER W/INTRP POSTPROCES: CPT

## 2018-04-04 PROCEDURE — 76641 ULTRASOUND BREAST COMPLETE: CPT

## 2018-04-18 ENCOUNTER — OFFICE VISIT (OUTPATIENT)
Dept: SURGICAL ONCOLOGY | Facility: CLINIC | Age: 48
End: 2018-04-18
Payer: COMMERCIAL

## 2018-04-18 VITALS
DIASTOLIC BLOOD PRESSURE: 70 MMHG | BODY MASS INDEX: 23.57 KG/M2 | RESPIRATION RATE: 16 BRPM | WEIGHT: 133 LBS | SYSTOLIC BLOOD PRESSURE: 100 MMHG | HEIGHT: 63 IN | TEMPERATURE: 98 F | HEART RATE: 70 BPM

## 2018-04-18 DIAGNOSIS — R92.2 INCONCLUSIVE MAMMOGRAM DUE TO DENSE BREASTS: ICD-10-CM

## 2018-04-18 DIAGNOSIS — N60.19 FIBROCYSTIC BREAST DISEASE (FCBD), UNSPECIFIED LATERALITY: ICD-10-CM

## 2018-04-18 DIAGNOSIS — Z12.31 SCREENING MAMMOGRAM, ENCOUNTER FOR: ICD-10-CM

## 2018-04-18 DIAGNOSIS — Z80.3 FAMILY HISTORY OF BREAST CANCER IN FEMALE: Primary | ICD-10-CM

## 2018-04-18 DIAGNOSIS — Z91.89 INCREASED RISK OF BREAST CANCER: ICD-10-CM

## 2018-04-18 PROCEDURE — 99213 OFFICE O/P EST LOW 20 MIN: CPT | Performed by: SURGERY

## 2018-04-18 NOTE — PROGRESS NOTES
Surgical Oncology Follow Up       [address]  [facility] [address] [address]    Lasha Moreland  1970  [id number]  [address]  [facility] [place]  [place] [address]    Chief Complaint   Patient presents with    Breast Problem     Pt is here for 6 month follow up       Assessment/Plan   Diagnoses and all orders for this visit:    Family history of breast cancer in female    Increased risk of breast cancer    Inconclusive mammogram due to dense breasts    Fibrocystic breast disease (FCBD), unspecified laterality    Screening mammogram, encounter for  -     Mammo screening bilateral w 3d & cad; Future        Advance Care Planning/Advance Directives:  Did not discuss  with the patient  Oncology History:     No history exists  History of Present Illness: follow up  -Interval History:none    Review of Systems:  Review of Systems   Constitutional: Negative  Negative for appetite change and fever  Eyes: Negative  Respiratory: Negative for shortness of breath  Cardiovascular: Negative  Gastrointestinal: Negative  Endocrine: Negative  Genitourinary: Negative  Musculoskeletal: Negative for arthralgias and myalgias  Tendinitis-shoulder   Skin: Negative  Allergic/Immunologic: Negative  Neurological: Negative  Hematological: Negative  Negative for adenopathy  Does not bruise/bleed easily  Psychiatric/Behavioral: Negative          Patient Active Problem List   Diagnosis    Leiomyoma of uterus    Pelvic and perineal pain    Fibrocystic breast disease    Inconclusive mammogram due to dense breasts    Increased risk of breast cancer    Family history of breast cancer in female    Screening mammogram, encounter for     Past Medical History:   Diagnosis Date    Abdominal pain     Abnormal breast finding     last assessed: 03/22/2016    Abnormal findings on diagnostic imaging of breast     Abnormal weight gain     Anxiety     Bloating     Last assessed: 13    Bloating     Endometriosis     Enlarged lymph node     Female infertility     Fibrocystic disease of breast     Fibroid uterus     Gluten intolerance      1 para 1     Headache     Heartburn     History of early menarche     History of ovarian cyst     Hx of migraines     None recently    Inconclusive mammogram due to dense breasts     Increased glucose level     Increased risk of breast cancer     Interstitial cystitis     Medications no longer needed    Intolerance to milk products     Intolerance to milk products     Irregular menstrual cycle     Lactose intolerance     Leiomyoma of uterus, unspecified     Lichen sclerosus et atrophicus     lastv assessed     Lichen sclerosus et atrophicus     Lichen simplex chronicus     Lichen simplex chronicus     Lichen simplex chronicus     Migraine     Migraine     Mild heartburn     Ovarian cyst     Last assessed:2015    Pelvic pain in female     PONV (postoperative nausea and vomiting)     Psoriasis     Currently on scalp    Psoriasis     Psoriasis     RLQ abdominal pain     Simple ovarian cyst     Urinary tract infection     Wears glasses     Yeast infection      Past Surgical History:   Procedure Laterality Date    BREAST BIOPSY  2009    OPEN    BREAST BIOPSY  2009    NEEDLE CORE    BREAST SURGERY Bilateral     Breast reduction    BUNIONECTOMY Right     5th toe right foot    CYSTOSCOPY N/A 10/3/2017    Procedure: CYSTOSCOPY;  Surgeon: Ct Valle DO;  [facility]: [facility];  Service: Gynecology    HYSTERECTOMY      supracervical    KNEE ARTHROSCOPY Right     Right outer meniscus    KNEE SURGERY Right     last assessed 12/15/14    LASIK      LASIK      LIPOMA RESECTION      x2 on back    MS LAP, SUPRACERVIAL HYSTERECTOMY W/ TUBE&OV, <250G N/A 10/3/2017    Procedure: HYSTERECTOMY LAPAROSCOPIC SUPRACERVICAL [facility]); Surgeon: Herminio Ruelas DO;  [facility];  Service: Gynecology    SALPINGECTOMY Bilateral 10/3/2017    Procedure: SALPINGECTOMY;  Surgeon: Herminio Ruelas DO;  [facility];  Service: Gynecology    SALPINGECTOMY Left     Unilateral , last assessed 10/19/17    WISDOM TOOTH EXTRACTION       Family History   Problem Relation Age of Onset    Breast cancer Mother 79    Other Mother      Hypoglycemia    Hyperlipidemia Father     Prostate cancer Father     Depression Sister     Lung cancer Maternal Grandmother     Breast cancer Maternal Grandmother 76    Breast cancer Paternal Grandmother 54    Prostate cancer Maternal Uncle 54    Prostate cancer Paternal Grandfather 61     Social History     Social History    Marital status: /Civil Union     Spouse name: N/A    Number of children: N/A    Years of education: N/A     Occupational History    Not on file       Social History Main Topics    Smoking status: Never Smoker    Smokeless tobacco: Never Used    Alcohol use Yes      Comment: 3 or 4 weekly    Drug use: No    Sexual activity: Not on file     Other Topics Concern    Not on file     Social History Narrative    Caffeine use    Uses safety equipment-seatbelts        Denied History of     Sikh Affiliation None       Current Outpatient Prescriptions:     acetaminophen (TYLENOL) 325 mg tablet, Take 650 mg by mouth every 6 (six) hours as needed for mild pain, Disp: , Rfl:     Acidophilus Lactobacillus CAPS, Take 1 capsule by mouth daily, Disp: , Rfl:     ALPRAZolam (XANAX) 0 25 mg tablet, Take 0 25 mg by mouth as needed for anxiety, Disp: , Rfl:     Ascorbic Acid (VITAMIN C) 1000 MG tablet, Take 1,000 mg by mouth daily, Disp: , Rfl:     calcium carbonate (OS-FREDI) 600 MG tablet, Take 600 mg by mouth daily With magnesium, Disp: , Rfl:     clobetasol (TEMOVATE) 0 05 % cream, Apply topically as needed, Disp: , Rfl:    co-enzyme Q-10 30 MG capsule, Take 30 mg by mouth 3 (three) times a day, Disp: , Rfl:     ibuprofen (MOTRIN) 400 mg tablet, Take by mouth every 6 (six) hours as needed for mild pain, Disp: , Rfl:     Melatonin 1 MG CAPS, Take 3 mg by mouth, Disp: , Rfl:     meloxicam (MOBIC) 15 mg tablet, TAKE 1 TABLET(S) EVERY DAY BY ORAL ROUTE AS NEEDED , Disp: , Rfl: 2    Multiple Vitamin (MULTIVITAMIN) capsule, Take 1 capsule by mouth daily, Disp: , Rfl:   Allergies   Allergen Reactions    Nickel Hives and Rash     From plated jewelry or any sort    Percocet [Oxycodone-Acetaminophen] Other (See Comments)     Jittery and teeth chatter    Sulfa Antibiotics GI Intolerance     Severe nausea    Gluten Meal GI Intolerance     Negative for celiac disease    Lactose GI Intolerance    Latex      Patient unsure if it was surgical glue or steristrips that were used on knee after surgery that redness and itching  States her surgeon told her to tell people she has a Latex allergy    Septra [Sulfamethoxazole-Trimethoprim]        The following portions of the patient's history were reviewed and updated as appropriate: allergies, current medications, past family history, past medical history, past social history, past surgical history and problem list         Vitals:    04/18/18 0900   BP: 100/70   Pulse: 70   Resp: 16   Temp: 98 °F (36 7 °C)       Physical Exam   Constitutional: She is oriented to person, place, and time  She appears well-developed and well-nourished  HENT:   Head: Normocephalic and atraumatic  Pulmonary/Chest: Right breast exhibits skin change (reduction scars)  Right breast exhibits no inverted nipple, no mass, no nipple discharge and no tenderness  Left breast exhibits skin change (reduction scars)  Left breast exhibits no inverted nipple, no mass, no nipple discharge and no tenderness  Lymphadenopathy:        Right axillary: No pectoral and no lateral adenopathy present          Left axillary: No pectoral and no lateral adenopathy present  Right: No supraclavicular adenopathy present  Left: No supraclavicular adenopathy present  Neurological: She is alert and oriented to person, place, and time  Psychiatric: She has a normal mood and affect  Results:      Imaging  4/4/18 ABUS benign    I reviewed the above imaging data  Discussion/Summary:  17-year-old female with dense breast tissue, fibrocystic changes and family history of breast cancer  Her concerns have diminished after the hysterectomy she had  Her tissue was also less nodular on examination  Her recent automated breast ultrasound was benign  I will make arrangements for her 3D screening mammogram due the end of September  I will see her again in six months  If there are no concerns at that time I will plan to follow her on an annual basis  I will also continue the automated breast ultrasound at this point

## 2018-04-18 NOTE — LETTER
April 18, 2018     Guerrero Paulr, DO  [address]    Patient: Ladarius Neff   YOB: 1970   Date of Visit: 4/18/2018       Dear Dr Herman Hall: Thank you for referring Ladarius Neff to me for evaluation  Below are my notes for this consultation  If you have questions, please do not hesitate to call me  I look forward to following your patient along with you  Sincerely,        Vy Scott MD        CC: No Recipients  Vy Scott MD  4/18/2018  9:25 AM  Sign at close encounter     Surgical Oncology Follow Up       [address] [facility]  1970  [id number]  [address]  [facility] [address] [address]    Chief Complaint   Patient presents with    Breast Problem     Pt is here for 6 month follow up       Assessment/Plan   Diagnoses and all orders for this visit:    Family history of breast cancer in female    Increased risk of breast cancer    Inconclusive mammogram due to dense breasts    Fibrocystic breast disease (FCBD), unspecified laterality    Screening mammogram, encounter for  -     Mammo screening bilateral w 3d & cad; Future        Advance Care Planning/Advance Directives:  Did not discuss  with the patient  Oncology History:     No history exists  History of Present Illness: follow up  -Interval History:none    Review of Systems:  Review of Systems   Constitutional: Negative  Negative for appetite change and fever  Eyes: Negative  Respiratory: Negative for shortness of breath  Cardiovascular: Negative  Gastrointestinal: Negative  Endocrine: Negative  Genitourinary: Negative  Musculoskeletal: Negative for arthralgias and myalgias  Tendinitis-shoulder   Skin: Negative  Allergic/Immunologic: Negative  Neurological: Negative  Hematological: Negative  Negative for adenopathy  Does not bruise/bleed easily  Psychiatric/Behavioral: Negative          Patient Active Problem List   Diagnosis    Leiomyoma of uterus    Pelvic and perineal pain    Fibrocystic breast disease    Inconclusive mammogram due to dense breasts    Increased risk of breast cancer    Family history of breast cancer in female    Screening mammogram, encounter for     Past Medical History:   Diagnosis Date    Abdominal pain     Abnormal breast finding     last assessed: 2016    Abnormal findings on diagnostic imaging of breast     Abnormal weight gain     Anxiety     Bloating     Last assessed: 13    Bloating     Endometriosis     Enlarged lymph node     Female infertility     Fibrocystic disease of breast     Fibroid uterus     Gluten intolerance      1 para 1     Headache     Heartburn     History of early menarche     History of ovarian cyst     Hx of migraines     None recently    Inconclusive mammogram due to dense breasts     Increased glucose level     Increased risk of breast cancer     Interstitial cystitis     Medications no longer needed    Intolerance to milk products     Intolerance to milk products     Irregular menstrual cycle     Lactose intolerance     Leiomyoma of uterus, unspecified     Lichen sclerosus et atrophicus     lastv assessed     Lichen sclerosus et atrophicus     Lichen simplex chronicus     Lichen simplex chronicus     Lichen simplex chronicus     Migraine     Migraine     Mild heartburn     Ovarian cyst     Last assessed:2015    Pelvic pain in female     PONV (postoperative nausea and vomiting)     Psoriasis     Currently on scalp    Psoriasis     Psoriasis     RLQ abdominal pain     Simple ovarian cyst     Urinary tract infection     Wears glasses     Yeast infection      Past Surgical History:   Procedure Laterality Date    BREAST BIOPSY  2009 OPEN    BREAST BIOPSY  2009    NEEDLE CORE    BREAST SURGERY Bilateral     Breast reduction    BUNIONECTOMY Right     5th toe right foot    CYSTOSCOPY N/A 10/3/2017    Procedure: CYSTOSCOPY;  Surgeon: Hans Quiroz DO;  [facility];  Service: Gynecology    HYSTERECTOMY      supracervical    KNEE ARTHROSCOPY Right     Right outer meniscus    KNEE SURGERY Right     last assessed 12/15/14    LASIK      LASIK      LIPOMA RESECTION      x2 on back    SD LAP, SUPRACERVIAL HYSTERECTOMY W/ TUBE&OV, <250G N/A 10/3/2017    Procedure: HYSTERECTOMY LAPAROSCOPIC SUPRACERVICAL [facility]); Surgeon: Hans Quiroz DO;  [facility];  Service: Gynecology    SALPINGECTOMY Bilateral 10/3/2017    Procedure: SALPINGECTOMY;  Surgeon: Hans Quiroz DO;  [facility];  Service: Gynecology    SALPINGECTOMY Left     Unilateral , last assessed 10/19/17    WISDOM TOOTH EXTRACTION       Family History   Problem Relation Age of Onset    Breast cancer Mother 79    Other Mother      Hypoglycemia    Hyperlipidemia Father     Prostate cancer Father     Depression Sister     Lung cancer Maternal Grandmother     Breast cancer Maternal Grandmother 76    Breast cancer Paternal Grandmother 54    Prostate cancer Maternal Uncle 54    Prostate cancer Paternal Grandfather 61     Social History     Social History    Marital status: /Civil Union     Spouse name: N/A    Number of children: N/A    Years of education: N/A     Occupational History    Not on file       Social History Main Topics    Smoking status: Never Smoker    Smokeless tobacco: Never Used    Alcohol use Yes      Comment: 3 or 4 weekly    Drug use: No    Sexual activity: Not on file     Other Topics Concern    Not on file     Social History Narrative    Caffeine use    Uses safety equipment-seatbelts        Denied History of     Temple Affiliation None       Current Outpatient Prescriptions:     acetaminophen (TYLENOL) 325 mg tablet, Take 650 mg by mouth every 6 (six) hours as needed for mild pain, Disp: , Rfl:     Acidophilus Lactobacillus CAPS, Take 1 capsule by mouth daily, Disp: , Rfl:     ALPRAZolam (XANAX) 0 25 mg tablet, Take 0 25 mg by mouth as needed for anxiety, Disp: , Rfl:     Ascorbic Acid (VITAMIN C) 1000 MG tablet, Take 1,000 mg by mouth daily, Disp: , Rfl:     calcium carbonate (OS-FREDI) 600 MG tablet, Take 600 mg by mouth daily With magnesium, Disp: , Rfl:     clobetasol (TEMOVATE) 0 05 % cream, Apply topically as needed, Disp: , Rfl:     co-enzyme Q-10 30 MG capsule, Take 30 mg by mouth 3 (three) times a day, Disp: , Rfl:     ibuprofen (MOTRIN) 400 mg tablet, Take by mouth every 6 (six) hours as needed for mild pain, Disp: , Rfl:     Melatonin 1 MG CAPS, Take 3 mg by mouth, Disp: , Rfl:     meloxicam (MOBIC) 15 mg tablet, TAKE 1 TABLET(S) EVERY DAY BY ORAL ROUTE AS NEEDED , Disp: , Rfl: 2    Multiple Vitamin (MULTIVITAMIN) capsule, Take 1 capsule by mouth daily, Disp: , Rfl:   Allergies   Allergen Reactions    Nickel Hives and Rash     From plated jewelry or any sort    Percocet [Oxycodone-Acetaminophen] Other (See Comments)     Jittery and teeth chatter    Sulfa Antibiotics GI Intolerance     Severe nausea    Gluten Meal GI Intolerance     Negative for celiac disease    Lactose GI Intolerance    Latex      Patient unsure if it was surgical glue or steristrips that were used on knee after surgery that redness and itching   States her surgeon told her to tell people she has a Latex allergy    Septra [Sulfamethoxazole-Trimethoprim]        The following portions of the patient's history were reviewed and updated as appropriate: allergies, current medications, past family history, past medical history, past social history, past surgical history and problem list         Vitals:    04/18/18 0900   BP: 100/70   Pulse: 70   Resp: 16   Temp: 98 °F (36 7 °C)       Physical Exam   Constitutional: She is oriented to person, place, and time  She appears well-developed and well-nourished  HENT:   Head: Normocephalic and atraumatic  Pulmonary/Chest: Right breast exhibits skin change (reduction scars)  Right breast exhibits no inverted nipple, no mass, no nipple discharge and no tenderness  Left breast exhibits skin change (reduction scars)  Left breast exhibits no inverted nipple, no mass, no nipple discharge and no tenderness  Lymphadenopathy:        Right axillary: No pectoral and no lateral adenopathy present  Left axillary: No pectoral and no lateral adenopathy present  Right: No supraclavicular adenopathy present  Left: No supraclavicular adenopathy present  Neurological: She is alert and oriented to person, place, and time  Psychiatric: She has a normal mood and affect  Results:      Imaging  4/4/18 ABUS benign    I reviewed the above imaging data  Discussion/Summary:  26-year-old female with dense breast tissue, fibrocystic changes and family history of breast cancer  Her concerns have diminished after the hysterectomy she had  Her tissue was also less nodular on examination  Her recent automated breast ultrasound was benign  I will make arrangements for her 3D screening mammogram due the end of September  I will see her again in six months  If there are no concerns at that time I will plan to follow her on an annual basis  I will also continue the automated breast ultrasound at this point

## 2018-10-02 ENCOUNTER — HOSPITAL ENCOUNTER (OUTPATIENT)
Dept: MAMMOGRAPHY | Facility: MEDICAL CENTER | Age: 48
Discharge: HOME | End: 2018-10-02
Payer: COMMERCIAL

## 2018-10-02 DIAGNOSIS — Z12.31 SCREENING MAMMOGRAM, ENCOUNTER FOR: ICD-10-CM

## 2018-10-02 PROCEDURE — 77067 SCR MAMMO BI INCL CAD: CPT

## 2018-10-02 PROCEDURE — 77063 BREAST TOMOSYNTHESIS BI: CPT

## 2018-10-31 ENCOUNTER — OFFICE VISIT (OUTPATIENT)
Dept: SURGICAL ONCOLOGY | Facility: CLINIC | Age: 48
End: 2018-10-31
Payer: COMMERCIAL

## 2018-10-31 VITALS
SYSTOLIC BLOOD PRESSURE: 100 MMHG | WEIGHT: 135 LBS | HEIGHT: 63 IN | DIASTOLIC BLOOD PRESSURE: 70 MMHG | TEMPERATURE: 99.6 F | BODY MASS INDEX: 23.92 KG/M2 | RESPIRATION RATE: 16 BRPM | HEART RATE: 72 BPM

## 2018-10-31 DIAGNOSIS — Z12.39 BREAST CANCER SCREENING OTHER THAN MAMMOGRAM: ICD-10-CM

## 2018-10-31 DIAGNOSIS — Z91.89 INCREASED RISK OF BREAST CANCER: ICD-10-CM

## 2018-10-31 DIAGNOSIS — Z80.3 FAMILY HISTORY OF BREAST CANCER IN FEMALE: Primary | ICD-10-CM

## 2018-10-31 DIAGNOSIS — Z12.31 SCREENING MAMMOGRAM, ENCOUNTER FOR: ICD-10-CM

## 2018-10-31 DIAGNOSIS — N60.19 FIBROCYSTIC BREAST DISEASE (FCBD), UNSPECIFIED LATERALITY: ICD-10-CM

## 2018-10-31 DIAGNOSIS — R92.2 INCONCLUSIVE MAMMOGRAM DUE TO DENSE BREASTS: ICD-10-CM

## 2018-10-31 PROCEDURE — 99213 OFFICE O/P EST LOW 20 MIN: CPT | Performed by: SURGERY

## 2018-10-31 NOTE — PROGRESS NOTES
Surgical Oncology Follow Up       [address]  [facility] [address] [address]    Lovely Blood  1970  [id number]  [address]  [facility] [place]    Chief Complaint   Patient presents with    FHx Breast Cancer     Pt is here for 6 month follow up        Assessment/Plan   Diagnoses and all orders for this visit:    Family history of breast cancer in female    Fibrocystic breast disease (FCBD), unspecified laterality    Inconclusive mammogram due to dense breasts    Increased risk of breast cancer    Screening mammogram, encounter for  -     Mammo screening bilateral w 3d & cad; Future    Breast cancer screening other than mammogram  -     US breast screening bilateral complete (ABUS); Future        Advance Care Planning/Advance Directives:  Did not discuss  with the patient  Oncology History:     No history exists  History of Present Illness: follow up   -Interval History: none    Review of Systems:  Review of Systems   Constitutional: Negative  Negative for appetite change and fever  Eyes: Negative  Respiratory: Negative for shortness of breath  Cardiovascular: Negative  Gastrointestinal: Negative  Endocrine: Negative  Genitourinary: Negative  Musculoskeletal: Negative  Negative for arthralgias and myalgias  Skin: Negative  Allergic/Immunologic: Negative  Neurological: Negative  Hematological: Negative  Negative for adenopathy  Does not bruise/bleed easily  Psychiatric/Behavioral: Negative          Patient Active Problem List   Diagnosis    Leiomyoma of uterus    Pelvic and perineal pain    Fibrocystic breast disease    Inconclusive mammogram due to dense breasts    Increased risk of breast cancer    Family history of breast cancer in female    Screening mammogram, encounter for    Breast cancer screening other than mammogram     Past Medical History:   Diagnosis Date    Abdominal pain     Abnormal weight gain     Anxiety     Bloating     Last assessed: 13    Bloating     Endometriosis     Enlarged lymph node     Female infertility     Fibroid uterus     Gluten intolerance      1 para 1     Headache     Heartburn     History of early menarche     History of ovarian cyst     Hx of migraines     None recently    Inconclusive mammogram due to dense breasts     Increased glucose level     Increased risk of breast cancer     Interstitial cystitis     Medications no longer needed    Intolerance to milk products     Intolerance to milk products     Irregular menstrual cycle     Lactose intolerance     Leiomyoma of uterus, unspecified     Lichen sclerosus et atrophicus     lastv assessed     Lichen sclerosus et atrophicus     Lichen simplex chronicus     Lichen simplex chronicus     Lichen simplex chronicus     Migraine     Migraine     Mild heartburn     Ovarian cyst     Last assessed:2015    Pelvic pain in female     PONV (postoperative nausea and vomiting)     Psoriasis     Currently on scalp    Psoriasis     Psoriasis     RLQ abdominal pain     Simple ovarian cyst     Urinary tract infection     Wears glasses     Yeast infection      Past Surgical History:   Procedure Laterality Date    BREAST BIOPSY  2009    OPEN    BREAST BIOPSY  2009    NEEDLE CORE    BREAST SURGERY Bilateral     Breast reduction    BUNIONECTOMY Right     5th toe right foot    CYSTOSCOPY N/A 10/3/2017    Procedure: CYSTOSCOPY;  Surgeon: Danial Guerrero DO;  [facility];  Service: Gynecology    HYSTERECTOMY      supracervical    KNEE ARTHROSCOPY Right     Right outer meniscus    KNEE SURGERY Right     last assessed 12/15/14    LASIK      LASIK      LIPOMA RESECTION      x2 on back    ME LAP, SUPRACERVIAL HYSTERECTOMY W/ TUBE&OV, <250G N/A 10/3/2017    Procedure: HYSTERECTOMY LAPAROSCOPIC SUPRACERVICAL [facility]); Surgeon: Danial Guerrero DO;  [facility];  Service: Gynecology    SALPINGECTOMY Bilateral 10/3/2017    Procedure: SALPINGECTOMY;  Surgeon: Danial Guerrero DO;  [facility];  Service: Gynecology    SALPINGECTOMY Left     Unilateral , last assessed 10/19/17    WISDOM TOOTH EXTRACTION       Family History   Problem Relation Age of Onset    Breast cancer Mother 79    Other Mother         Hypoglycemia    Hyperlipidemia Father     Prostate cancer Father     Depression Sister     Lung cancer Maternal Grandmother     Breast cancer Maternal Grandmother 76    Breast cancer Paternal Grandmother 54    Prostate cancer Maternal Uncle 54    Prostate cancer Paternal Grandfather 61     Social History     Social History    Marital status: /Civil Union     Spouse name: N/A    Number of children: N/A    Years of education: N/A     Occupational History    Not on file       Social History Main Topics    Smoking status: Never Smoker    Smokeless tobacco: Never Used    Alcohol use Yes      Comment: 3 or 4 weekly    Drug use: No    Sexual activity: Not on file     Other Topics Concern    Not on file     Social History Narrative    Caffeine use    Uses safety equipment-seatbelts        Denied History of     Baptism Affiliation None       Current Outpatient Prescriptions:     Acidophilus Lactobacillus CAPS, Take 1 capsule by mouth daily, Disp: , Rfl:     ALPRAZolam (XANAX) 0 25 mg tablet, Take 0 25 mg by mouth as needed for anxiety, Disp: , Rfl:     Ascorbic Acid (VITAMIN C) 1000 MG tablet, Take 1,000 mg by mouth daily, Disp: , Rfl:     calcium carbonate (OS-FREDI) 600 MG tablet, Take 600 mg by mouth daily With magnesium, Disp: , Rfl:     clobetasol (TEMOVATE) 0 05 % cream, Apply topically as needed, Disp: , Rfl:     co-enzyme Q-10 30 MG capsule, Take 30 mg by mouth 3 (three) times a day, Disp: , Rfl:     Melatonin 1 MG CAPS, Take 3 mg by mouth, Disp: , Rfl:     Multiple Vitamin (MULTIVITAMIN) capsule, Take 1 capsule by mouth daily, Disp: , Rfl:     acetaminophen (TYLENOL) 325 mg tablet, Take 650 mg by mouth every 6 (six) hours as needed for mild pain, Disp: , Rfl:     ibuprofen (MOTRIN) 400 mg tablet, Take by mouth every 6 (six) hours as needed for mild pain, Disp: , Rfl:     meloxicam (MOBIC) 15 mg tablet, TAKE 1 TABLET(S) EVERY DAY BY ORAL ROUTE AS NEEDED , Disp: , Rfl: 2  Allergies   Allergen Reactions    Nickel Hives and Rash     From plated jewelry or any sort    Percocet [Oxycodone-Acetaminophen] Other (See Comments)     Jittery and teeth chatter    Sulfa Antibiotics GI Intolerance     Severe nausea    Gluten Meal GI Intolerance     Negative for celiac disease    Lactose GI Intolerance    Latex      Patient unsure if it was surgical glue or steristrips that were used on knee after surgery that redness and itching  States her surgeon told her to tell people she has a Latex allergy    Septra [Sulfamethoxazole-Trimethoprim]        The following portions of the patient's history were reviewed and updated as appropriate: allergies, current medications, past family history, past medical history, past social history, past surgical history and problem list         Vitals:    10/31/18 1549   BP: 100/70   Pulse: 72   Resp: 16   Temp: 99 6 °F (37 6 °C)       Physical Exam   Constitutional: She is oriented to person, place, and time  She appears well-developed and well-nourished  HENT:   Head: Normocephalic and atraumatic  Pulmonary/Chest: Right breast exhibits skin change (reduction scar)  Right breast exhibits no inverted nipple, no mass, no nipple discharge and no tenderness  Left breast exhibits skin change (reduction scar)  Left breast exhibits no inverted nipple, no mass, no nipple discharge and no tenderness  Lymphadenopathy:        Right axillary: No pectoral and no lateral adenopathy present          Left axillary: No pectoral and no lateral adenopathy present  Right: No supraclavicular adenopathy present  Left: No supraclavicular adenopathy present  Neurological: She is alert and oriented to person, place, and time  Psychiatric: She has a normal mood and affect  Results:      Imaging  10/02/2018 bilateral 3D screening mammogram is benign BI-RADS two with a density of three    I reviewed the above imaging data  Discussion/Summary:  53-year-old female with dense breast tissue, fibrocystic changes and family history  There are no concerns on examination today  Her recent mammogram was benign  I will continue ordering both screening mammography and automated breast ultrasound  I will see her again in one year or sooner should the need arise

## 2019-04-05 ENCOUNTER — HOSPITAL ENCOUNTER (OUTPATIENT)
Dept: ULTRASOUND IMAGING | Facility: CLINIC | Age: 49
Discharge: HOME | End: 2019-04-05
Payer: COMMERCIAL

## 2019-04-05 VITALS — WEIGHT: 135 LBS | BODY MASS INDEX: 23.92 KG/M2 | HEIGHT: 63 IN

## 2019-04-05 DIAGNOSIS — Z12.39 BREAST CANCER SCREENING OTHER THAN MAMMOGRAM: ICD-10-CM

## 2019-04-05 PROCEDURE — 76641 ULTRASOUND BREAST COMPLETE: CPT

## 2019-04-05 PROCEDURE — 76377 3D RENDER W/INTRP POSTPROCES: CPT

## 2019-07-11 ENCOUNTER — ANNUAL EXAM (OUTPATIENT)
Dept: OBGYN CLINIC | Facility: CLINIC | Age: 49
End: 2019-07-11
Payer: COMMERCIAL

## 2019-07-11 VITALS
BODY MASS INDEX: 23.92 KG/M2 | DIASTOLIC BLOOD PRESSURE: 70 MMHG | HEIGHT: 63 IN | SYSTOLIC BLOOD PRESSURE: 120 MMHG | WEIGHT: 135 LBS

## 2019-07-11 DIAGNOSIS — Z01.419 ENCOUNTER FOR ANNUAL ROUTINE GYNECOLOGICAL EXAMINATION: Primary | ICD-10-CM

## 2019-07-11 PROCEDURE — 99396 PREV VISIT EST AGE 40-64: CPT | Performed by: OBSTETRICS & GYNECOLOGY

## 2019-07-11 RX ORDER — MELOXICAM 15 MG/1
TABLET ORAL
COMMUNITY
End: 2022-04-26

## 2019-07-11 RX ORDER — ALPRAZOLAM 0.25 MG/1
TABLET ORAL
COMMUNITY

## 2019-07-11 RX ORDER — BUTALBITAL, ACETAMINOPHEN AND CAFFEINE 50; 325; 40 MG/1; MG/1; MG/1
TABLET ORAL
COMMUNITY
Start: 2019-06-10

## 2019-07-11 RX ORDER — CLOBETASOL PROPIONATE 0.5 MG/G
OINTMENT TOPICAL
COMMUNITY
Start: 2014-04-03 | End: 2022-04-26

## 2019-07-11 NOTE — PROGRESS NOTES
Assessment/Plan:    pap is up to date    mammogram and automated breast ultrasound reviewed with her  She will continue seeing Dr Yaneth Aponte  Discussed self breast exams    colon cancer screening-colonoscopy done in April and was normal, she can return in 10 years  discussed preventive care, regular exercise and a healthy diet      No problem-specific Assessment & Plan notes found for this encounter  There are no diagnoses linked to this encounter  Subjective:      Patient ID: Julian Acevedo is a 46 y o  female  Pt here for yearly  She sees Dr Yaneth Aponte yearly for elevated breast cancer risk  She is having 3D mammograms and automated breast ultrasound  S/p supracervical hysterectomy, b/l salpingectomy in October 2017  She has no bleeding but does notice signs of ovulation  Normal pap and negative HPV in 2018  The following portions of the patient's history were reviewed and updated as appropriate: allergies, current medications, past family history, past medical history, past social history, past surgical history and problem list     Review of Systems   Constitutional: Negative  HENT: Negative  Eyes: Negative  Respiratory: Negative  Cardiovascular: Negative  Gastrointestinal: Negative  Endocrine: Negative  Genitourinary: Negative  Musculoskeletal: Negative  Skin: Negative  Allergic/Immunologic: Negative  Neurological: Negative  Hematological: Negative  Psychiatric/Behavioral: Negative  Objective:      /70 (BP Location: Left arm, Patient Position: Sitting, Cuff Size: Standard)   Ht 5' 3" (1 6 m)   Wt 61 2 kg (135 lb)   LMP 09/09/2017 (Exact Date)   BMI 23 91 kg/m²          Physical Exam   Constitutional: She appears well-developed  Neck: No tracheal deviation present  No thyromegaly present  Cardiovascular: Normal rate and regular rhythm     Pulmonary/Chest: Effort normal and breath sounds normal  Right breast exhibits no inverted nipple, no mass, no nipple discharge, no skin change and no tenderness  Left breast exhibits no inverted nipple, no mass, no nipple discharge, no skin change and no tenderness  Breasts are symmetrical    Examined seated and supine   Abdominal: Soft  She exhibits no distension and no mass  There is no tenderness  Genitourinary: Rectum normal and vagina normal  No labial fusion  There is no rash, tenderness, lesion or injury on the right labia  There is no rash, tenderness, lesion or injury on the left labia  Cervix exhibits no motion tenderness, no discharge and no friability  Right adnexum displays no mass, no tenderness and no fullness  Left adnexum displays no mass, no tenderness and no fullness  Genitourinary Comments: Cervix is normal, uterus is surgically absent   Vitals reviewed

## 2019-08-28 ENCOUNTER — TELEPHONE (OUTPATIENT)
Dept: OBGYN CLINIC | Facility: CLINIC | Age: 49
End: 2019-08-28

## 2019-08-29 DIAGNOSIS — N83.209 CYST OF OVARY, UNSPECIFIED LATERALITY: Primary | ICD-10-CM

## 2019-08-30 ENCOUNTER — HOSPITAL ENCOUNTER (OUTPATIENT)
Dept: ULTRASOUND IMAGING | Facility: HOSPITAL | Age: 49
Discharge: HOME | End: 2019-08-30
Attending: OBSTETRICS & GYNECOLOGY
Payer: COMMERCIAL

## 2019-08-30 DIAGNOSIS — N83.209 CYST OF OVARY, UNSPECIFIED LATERALITY: ICD-10-CM

## 2019-08-30 PROCEDURE — 76830 TRANSVAGINAL US NON-OB: CPT

## 2019-08-30 PROCEDURE — 76856 US EXAM PELVIC COMPLETE: CPT

## 2019-09-08 DIAGNOSIS — N83.209 CYST OF OVARY, UNSPECIFIED LATERALITY: Primary | ICD-10-CM

## 2019-09-10 ENCOUNTER — TELEPHONE (OUTPATIENT)
Dept: OBGYN CLINIC | Facility: CLINIC | Age: 49
End: 2019-09-10

## 2019-09-10 NOTE — TELEPHONE ENCOUNTER
----- Message from Gretchen El DO sent at 9/8/2019  7:27 AM EDT -----  Cyst on left ovary, right ovary is normal, she should repeat US in 3 months, I placed the order

## 2019-10-03 ENCOUNTER — HOSPITAL ENCOUNTER (OUTPATIENT)
Dept: MAMMOGRAPHY | Facility: MEDICAL CENTER | Age: 49
Discharge: HOME | End: 2019-10-03
Payer: COMMERCIAL

## 2019-10-03 VITALS — WEIGHT: 135 LBS | HEIGHT: 63 IN | BODY MASS INDEX: 23.92 KG/M2

## 2019-10-03 DIAGNOSIS — Z12.31 SCREENING MAMMOGRAM, ENCOUNTER FOR: ICD-10-CM

## 2019-10-03 PROCEDURE — 77063 BREAST TOMOSYNTHESIS BI: CPT

## 2019-10-03 PROCEDURE — 77067 SCR MAMMO BI INCL CAD: CPT

## 2019-10-10 ENCOUNTER — HOSPITAL ENCOUNTER (OUTPATIENT)
Dept: MAMMOGRAPHY | Facility: MEDICAL CENTER | Age: 49
Discharge: HOME | End: 2019-10-10

## 2019-10-10 VITALS — HEIGHT: 63 IN | BODY MASS INDEX: 23.92 KG/M2 | WEIGHT: 135 LBS

## 2019-10-10 DIAGNOSIS — R92.2 INCONCLUSIVE MAMMOGRAPHY: ICD-10-CM

## 2019-10-14 ENCOUNTER — OFFICE VISIT (OUTPATIENT)
Dept: SURGICAL ONCOLOGY | Facility: CLINIC | Age: 49
End: 2019-10-14
Payer: COMMERCIAL

## 2019-10-14 VITALS
TEMPERATURE: 98.3 F | HEIGHT: 63 IN | RESPIRATION RATE: 16 BRPM | DIASTOLIC BLOOD PRESSURE: 70 MMHG | HEART RATE: 77 BPM | WEIGHT: 138 LBS | BODY MASS INDEX: 24.45 KG/M2 | SYSTOLIC BLOOD PRESSURE: 100 MMHG

## 2019-10-14 DIAGNOSIS — Z12.31 SCREENING MAMMOGRAM, ENCOUNTER FOR: ICD-10-CM

## 2019-10-14 DIAGNOSIS — Z80.3 FAMILY HISTORY OF BREAST CANCER IN FEMALE: ICD-10-CM

## 2019-10-14 DIAGNOSIS — R92.2 INCONCLUSIVE MAMMOGRAM DUE TO DENSE BREASTS: ICD-10-CM

## 2019-10-14 DIAGNOSIS — Z91.89 INCREASED RISK OF BREAST CANCER: ICD-10-CM

## 2019-10-14 DIAGNOSIS — N60.19 FIBROCYSTIC BREAST DISEASE (FCBD), UNSPECIFIED LATERALITY: Primary | ICD-10-CM

## 2019-10-14 DIAGNOSIS — Z12.39 BREAST CANCER SCREENING OTHER THAN MAMMOGRAM: ICD-10-CM

## 2019-10-14 PROCEDURE — 99213 OFFICE O/P EST LOW 20 MIN: CPT | Performed by: SURGERY

## 2019-10-14 NOTE — PROGRESS NOTES
Surgical Oncology Follow Up       [facility]  [facility] SURGICAL ONCOLOGY [facility] [address]    Tk Hollis  1970  [id number]  [address]  [facility] [place]  [address] [place] [address]    Chief Complaint   Patient presents with    Follow-up       Assessment/Plan   Diagnoses and all orders for this visit:    Fibrocystic breast disease (FCBD), unspecified laterality    Breast cancer screening other than mammogram  -     US breast screening bilateral complete (ABUS); Future    Family history of breast cancer in female    Inconclusive mammogram due to dense breasts  -     US breast screening bilateral complete (ABUS); Future    Increased risk of breast cancer    Screening mammogram, encounter for  -     Mammo screening bilateral w 3d & cad; Future        Advance Care Planning/Advance Directives:  Did not discuss  with the patient  Oncology History:     No history exists  History of Present Illness: Follow-up visit secondary to fibrocystic changes, dense breast tissue and family history  -Interval History:  None    Review of Systems:  Review of Systems   Constitutional: Negative  Negative for appetite change and fever  Eyes: Negative  Respiratory: Negative for shortness of breath  Cardiovascular: Negative  Gastrointestinal: Negative  Endocrine: Negative  Genitourinary: Negative  Musculoskeletal: Negative  Negative for arthralgias and myalgias  Skin: Negative  Allergic/Immunologic: Negative  Neurological: Negative  Hematological: Negative  Negative for adenopathy  Does not bruise/bleed easily  Psychiatric/Behavioral: Negative          Patient Active Problem List   Diagnosis    Leiomyoma of uterus    Pelvic and perineal pain    Fibrocystic breast disease    Inconclusive mammogram due to dense breasts    Increased risk of breast cancer    Family history of breast cancer in female    Screening mammogram, encounter for    Breast cancer screening other than mammogram     Past Medical History:   Diagnosis Date    Abdominal pain     Abnormal weight gain     Anxiety     Bloating     Last assessed: 13    Bloating     Endometriosis     Enlarged lymph node     Female infertility     Fibroid uterus     Gluten intolerance      1 para 1     Headache     Heartburn     History of early menarche     History of ovarian cyst     Hx of migraines     None recently    Inconclusive mammogram due to dense breasts     Increased glucose level     Increased risk of breast cancer     Interstitial cystitis     Medications no longer needed    Intolerance to milk products     Intolerance to milk products     Irregular menstrual cycle     Lactose intolerance     Leiomyoma of uterus, unspecified     Lichen sclerosus et atrophicus     lastv assessed     Lichen sclerosus et atrophicus     Lichen simplex chronicus     Lichen simplex chronicus     Lichen simplex chronicus     Migraine     Migraine     Mild heartburn     Ovarian cyst     Last assessed:2015    Pelvic pain in female     PONV (postoperative nausea and vomiting)     Psoriasis     Currently on scalp    Psoriasis     Psoriasis     RLQ abdominal pain     Simple ovarian cyst     Urinary tract infection     Wears glasses     Yeast infection      Past Surgical History:   Procedure Laterality Date    BREAST BIOPSY  2009    OPEN    BREAST BIOPSY  2009    NEEDLE CORE    BREAST SURGERY Bilateral     Breast reduction    BUNIONECTOMY Right     5th toe right foot    CYSTOSCOPY N/A 10/3/2017    Procedure: CYSTOSCOPY;  Surgeon: Viviana Sales DO;  [facility];  Service: Gynecology    HYSTERECTOMY      supracervical    KNEE ARTHROSCOPY Right     Right outer meniscus    KNEE SURGERY Right     last assessed 12/15/14    LASIK      LASIK      LIPOMA RESECTION x2 on back    SD LAP, SUPRACERVIAL HYSTERECTOMY W/ TUBE&OV, <250G N/A 10/3/2017    Procedure: HYSTERECTOMY LAPAROSCOPIC SUPRACERVICAL [facility]);   Surgeon: Sherryle Pimple, DO;  [facility];  Service: Gynecology    SALPINGECTOMY Bilateral 10/3/2017    Procedure: SALPINGECTOMY;  Surgeon: Sherryle Pimple, DO;  [facility];  Service: Gynecology    SALPINGECTOMY Left     Unilateral , last assessed 10/19/17    WISDOM TOOTH EXTRACTION       Family History   Problem Relation Age of Onset    Breast cancer Mother 79    Other Mother         Hypoglycemia    Hyperlipidemia Father     Prostate cancer Father     Depression Sister     Lung cancer Maternal Grandmother     Breast cancer Maternal Grandmother 76    Breast cancer Paternal Grandmother 54    Prostate cancer Maternal Uncle 54    No Known Problems Paternal Grandfather     Prostate cancer Maternal Grandfather      Social History     Socioeconomic History    Marital status: /Civil Union     Spouse name: Not on file    Number of children: Not on file    Years of education: Not on file    Highest education level: Not on file   Occupational History    Not on file   Social Needs    Financial resource strain: Not on file    Food insecurity:     Worry: Not on file     Inability: Not on file    Transportation needs:     Medical: Not on file     Non-medical: Not on file   Tobacco Use    Smoking status: Never Smoker    Smokeless tobacco: Never Used   Substance and Sexual Activity    Alcohol use: Yes     Comment: 3 or 4 weekly    Drug use: No    Sexual activity: Yes     Partners: Male   Lifestyle    Physical activity:     Days per week: Not on file     Minutes per session: Not on file    Stress: Not on file   Relationships    Social connections:     Talks on phone: Not on file     Gets together: Not on file     Attends Restorationism service: Not on file     Active member of club or organization: Not on file     Attends meetings of clubs or organizations: Not on file     Relationship status: Not on file    Intimate partner violence:     Fear of current or ex partner: Not on file     Emotionally abused: Not on file     Physically abused: Not on file     Forced sexual activity: Not on file   Other Topics Concern    Not on file   Social History Narrative    Caffeine use    Uses safety equipment-seatbelts        Denied History of     Voodoo Affiliation None       Current Outpatient Medications:     Acidophilus Lactobacillus CAPS, Take 1 capsule by mouth daily, Disp: , Rfl:     ALPRAZolam (XANAX) 0 25 mg tablet, Take 0 25 mg by mouth as needed for anxiety, Disp: , Rfl:     Ascorbic Acid (VITAMIN C) 1000 MG tablet, Take 1,000 mg by mouth daily, Disp: , Rfl:     butalbital-acetaminophen-caffeine (FIORICET,ESGIC) -40 mg per tablet, butalbital-acetaminophen-caffeine 50 mg-325 mg-40 mg tablet, Disp: , Rfl:     calcium carbonate (OS-FREDI) 600 MG tablet, Take 600 mg by mouth daily With magnesium, Disp: , Rfl:     clobetasol (TEMOVATE) 0 05 % cream, Apply topically as needed, Disp: , Rfl:     clobetasol (TEMOVATE) 0 05 % ointment, Apply as needed for Psoriasis, Disp: , Rfl:     co-enzyme Q-10 30 MG capsule, Take 30 mg by mouth 3 (three) times a day, Disp: , Rfl:     ibuprofen (MOTRIN) 400 mg tablet, Take by mouth every 6 (six) hours as needed for mild pain, Disp: , Rfl:     Melatonin 1 MG CAPS, Take 3 mg by mouth, Disp: , Rfl:     Multiple Vitamin (MULTIVITAMIN) capsule, Take 1 capsule by mouth daily, Disp: , Rfl:     acetaminophen (TYLENOL) 325 mg tablet, Take 650 mg by mouth every 6 (six) hours as needed for mild pain, Disp: , Rfl:     ALPRAZolam (XANAX) 0 25 mg tablet, Xanax, Disp: , Rfl:     meloxicam (MOBIC) 15 mg tablet, TAKE 1 TABLET(S) EVERY DAY BY ORAL ROUTE AS NEEDED , Disp: , Rfl: 2    meloxicam (MOBIC) 15 mg tablet, meloxicam 15 mg tablet  TAKE 1 TABLET(S) EVERY DAY BY ORAL ROUTE AS NEEDED , Disp: , Rfl:     Multiple Vitamins-Minerals (MULTIVITAMIN ADULT EXTRA C PO), multivitamin, Disp: , Rfl:   Allergies   Allergen Reactions    Nickel Hives and Rash     From plated jewelry or any sort    Percocet [Oxycodone-Acetaminophen] Other (See Comments)     Jittery and teeth chatter    Sulfa Antibiotics GI Intolerance and Hives     Severe nausea  "Nausea T constipation"    Wound Dressing Adhesive Rash    Gluten Meal GI Intolerance     Negative for celiac disease    Lactose GI Intolerance    Latex      Patient unsure if it was surgical glue or steristrips that were used on knee after surgery that redness and itching  States her surgeon told her to tell people she has a Latex allergy    Septra [Sulfamethoxazole-Trimethoprim]        The following portions of the patient's history were reviewed and updated as appropriate: allergies, current medications, past family history, past medical history, past social history, past surgical history and problem list         Vitals:    10/14/19 0827   BP: 100/70   Pulse: 77   Resp: 16   Temp: 98 3 °F (36 8 °C)       Physical Exam   Constitutional: She is oriented to person, place, and time  She appears well-developed and well-nourished  HENT:   Head: Normocephalic and atraumatic  Pulmonary/Chest: Right breast exhibits no inverted nipple, no mass, no nipple discharge, no skin change and no tenderness  Left breast exhibits no inverted nipple, no mass, no nipple discharge, no skin change and no tenderness  Lymphadenopathy:        Right axillary: No pectoral and no lateral adenopathy present  Left axillary: No pectoral and no lateral adenopathy present  Right: No supraclavicular adenopathy present  Left: No supraclavicular adenopathy present  Neurological: She is alert and oriented to person, place, and time  Psychiatric: She has a normal mood and affect           Results:  Labs:      Imaging  04/05/2019 automated breast ultrasound is benign  10/03/2019 bilateral 3D mammogram was a BI-RADS 0 secondary to needing a repeat right cc view but otherwise negative  10/10/2019 repeat right cc was unremarkable    I reviewed the above imaging data  Discussion/Summary:  49-year-old female with dense breast tissue, fibrocystic changes and family history of breast cancer  She had a prior benign biopsy  She is a high risk patient  There are no concerns on examination today  All of her imaging this past year was benign  I will continue to order both mammography and automated breast ultrasound for her  I will see her again in one year or sooner should the need arise

## 2019-12-27 ENCOUNTER — HOSPITAL ENCOUNTER (OUTPATIENT)
Dept: ULTRASOUND IMAGING | Facility: HOSPITAL | Age: 49
Discharge: HOME | End: 2019-12-27
Attending: OBSTETRICS & GYNECOLOGY
Payer: COMMERCIAL

## 2019-12-27 DIAGNOSIS — N83.209 CYST OF OVARY, UNSPECIFIED LATERALITY: ICD-10-CM

## 2019-12-27 PROCEDURE — 76856 US EXAM PELVIC COMPLETE: CPT

## 2019-12-27 PROCEDURE — 76830 TRANSVAGINAL US NON-OB: CPT

## 2020-01-03 DIAGNOSIS — N83.209 CYST OF OVARY, UNSPECIFIED LATERALITY: Primary | ICD-10-CM

## 2020-01-08 ENCOUNTER — TELEPHONE (OUTPATIENT)
Dept: OBGYN CLINIC | Facility: CLINIC | Age: 50
End: 2020-01-08

## 2020-01-08 NOTE — TELEPHONE ENCOUNTER
----- Message from Viviana Sales DO sent at 1/3/2020 12:44 PM EST -----  US shows left ovarian mass present, smaller than before, they recommend recheck in 12 weeks  I placed the order

## 2020-05-22 ENCOUNTER — HOSPITAL ENCOUNTER (OUTPATIENT)
Dept: ULTRASOUND IMAGING | Facility: CLINIC | Age: 50
Discharge: HOME | End: 2020-05-22
Payer: COMMERCIAL

## 2020-05-22 VITALS — WEIGHT: 138 LBS | BODY MASS INDEX: 24.45 KG/M2 | HEIGHT: 63 IN

## 2020-05-22 DIAGNOSIS — R92.2 INCONCLUSIVE MAMMOGRAM DUE TO DENSE BREASTS: ICD-10-CM

## 2020-05-22 DIAGNOSIS — Z12.39 BREAST CANCER SCREENING OTHER THAN MAMMOGRAM: ICD-10-CM

## 2020-05-22 PROCEDURE — 76641 ULTRASOUND BREAST COMPLETE: CPT

## 2020-05-22 PROCEDURE — 76377 3D RENDER W/INTRP POSTPROCES: CPT

## 2020-09-01 ENCOUNTER — ANNUAL EXAM (OUTPATIENT)
Dept: OBGYN CLINIC | Facility: CLINIC | Age: 50
End: 2020-09-01
Payer: COMMERCIAL

## 2020-09-01 VITALS
BODY MASS INDEX: 24.1 KG/M2 | TEMPERATURE: 97.5 F | DIASTOLIC BLOOD PRESSURE: 64 MMHG | WEIGHT: 136 LBS | HEIGHT: 63 IN | SYSTOLIC BLOOD PRESSURE: 108 MMHG

## 2020-09-01 DIAGNOSIS — A64 STD (FEMALE): Primary | ICD-10-CM

## 2020-09-01 DIAGNOSIS — N83.202 CYST OF LEFT OVARY: ICD-10-CM

## 2020-09-01 DIAGNOSIS — Z01.419 ENCOUNTER FOR ANNUAL ROUTINE GYNECOLOGICAL EXAMINATION: ICD-10-CM

## 2020-09-01 DIAGNOSIS — L90.0 LICHEN SCLEROSUS ET ATROPHICUS: ICD-10-CM

## 2020-09-01 PROCEDURE — 99396 PREV VISIT EST AGE 40-64: CPT | Performed by: OBSTETRICS & GYNECOLOGY

## 2020-09-01 RX ORDER — CLOBETASOL PROPIONATE 0.5 MG/G
CREAM TOPICAL
Qty: 45 G | Refills: 1 | Status: SHIPPED | OUTPATIENT
Start: 2020-09-01

## 2020-09-01 NOTE — PROGRESS NOTES
Assessment/Plan:    pap is up to date    STD testing done today    Elevated breast cancer risk - mammogram reviewed with her including breast density  This is ordered by Dr Rigoberto Edwards     Discussed self breast exams    colon cancer screening - April 2019    Left ovarian cyst - this was smaller on the last US  repeat ordered  discussed preventive care, regular exercise and a healthy diet  Offered support regarding her marital difficulties  No problem-specific Assessment & Plan notes found for this encounter  Diagnoses and all orders for this visit:    STD (female)  -     HIV 1/2 ANTIGEN/ANTIBODY (4TH GENERATION) [facility]; Future  -     Hepatitis panel, acute; Future  -     RPR; Future  -     Chlamydia/GC amplified DNA by PCR; Future          Subjective:      Patient ID: Sofia Bah is a 44 y o  female  Pt here for yearly  S/p supracervical hysterectomy and salpingectomy  She sees Dr Rigoberto Edwards due to an elevated breast cancer risk  She has a 3D mammogram automated breast ultrasound every year  She had a normal 3D mammogram in October 2019  She was recalled for an additional view due to technical limitations and this was normal   Automated breast ultrasound was normal in May  She recently discovered that her  was unfaithful and is requesting STD testing  US done in December showed a hemorrhagic cyst of her left ovary  This was due to be rechecked but was postponed due to Covid  Normal Pap and negative HPV in 2018  The following portions of the patient's history were reviewed and updated as appropriate: allergies, current medications, past family history, past medical history, past social history, past surgical history and problem list     Review of Systems   Constitutional: Negative  Gastrointestinal: Negative  Genitourinary: Negative  Objective:      LMP 09/09/2017 (Exact Date)          Physical Exam  Vitals signs reviewed     Constitutional: Appearance: She is well-developed  Neck:      Thyroid: No thyromegaly  Trachea: No tracheal deviation  Cardiovascular:      Rate and Rhythm: Normal rate and regular rhythm  Pulmonary:      Effort: Pulmonary effort is normal       Breath sounds: Normal breath sounds  Chest:      Breasts: Breasts are symmetrical          Right: No inverted nipple, mass, nipple discharge, skin change or tenderness  Left: No inverted nipple, mass, nipple discharge, skin change or tenderness  Abdominal:      General: There is no distension  Palpations: Abdomen is soft  There is no mass  Tenderness: There is no abdominal tenderness  Genitourinary:     Labia:         Right: No rash, tenderness, lesion or injury  Left: No rash, tenderness, lesion or injury  Vagina: Normal       Cervix: No cervical motion tenderness, discharge or friability  Adnexa:         Right: No mass, tenderness or fullness  Left: No mass, tenderness or fullness          Rectum: Normal       Comments: Cervix is normal, uterus is absent

## 2020-09-02 LAB
C TRACH RRNA SPEC QL NAA+PROBE: NOT DETECTED
N GONORRHOEA RRNA SPEC QL NAA+PROBE: NOT DETECTED

## 2020-09-04 ENCOUNTER — HOSPITAL ENCOUNTER (OUTPATIENT)
Dept: ULTRASOUND IMAGING | Facility: HOSPITAL | Age: 50
Discharge: HOME | End: 2020-09-04
Attending: OBSTETRICS & GYNECOLOGY
Payer: COMMERCIAL

## 2020-09-04 DIAGNOSIS — N83.202 CYST OF LEFT OVARY: ICD-10-CM

## 2020-09-04 LAB
EXTERNAL HIV SCREEN: NORMAL
HCV AB SER-ACNC: NEGATIVE

## 2020-09-04 PROCEDURE — 76856 US EXAM PELVIC COMPLETE: CPT

## 2020-09-04 PROCEDURE — 76830 TRANSVAGINAL US NON-OB: CPT

## 2020-10-06 ENCOUNTER — HOSPITAL ENCOUNTER (OUTPATIENT)
Dept: MAMMOGRAPHY | Facility: MEDICAL CENTER | Age: 50
Discharge: HOME | End: 2020-10-06
Payer: COMMERCIAL

## 2020-10-06 VITALS — WEIGHT: 136 LBS | HEIGHT: 63 IN | BODY MASS INDEX: 24.1 KG/M2

## 2020-10-06 DIAGNOSIS — Z12.31 SCREENING MAMMOGRAM, ENCOUNTER FOR: ICD-10-CM

## 2020-10-06 PROCEDURE — 77063 BREAST TOMOSYNTHESIS BI: CPT

## 2020-10-06 PROCEDURE — 77067 SCR MAMMO BI INCL CAD: CPT

## 2020-10-13 ENCOUNTER — OFFICE VISIT (OUTPATIENT)
Dept: SURGICAL ONCOLOGY | Facility: CLINIC | Age: 50
End: 2020-10-13
Payer: COMMERCIAL

## 2020-10-13 VITALS
TEMPERATURE: 96.6 F | HEIGHT: 63 IN | HEART RATE: 63 BPM | BODY MASS INDEX: 23.74 KG/M2 | WEIGHT: 134 LBS | SYSTOLIC BLOOD PRESSURE: 126 MMHG | DIASTOLIC BLOOD PRESSURE: 78 MMHG

## 2020-10-13 DIAGNOSIS — Z12.31 SCREENING MAMMOGRAM, ENCOUNTER FOR: ICD-10-CM

## 2020-10-13 DIAGNOSIS — Z91.89 INCREASED RISK OF BREAST CANCER: ICD-10-CM

## 2020-10-13 DIAGNOSIS — N60.19 FIBROCYSTIC BREAST DISEASE (FCBD), UNSPECIFIED LATERALITY: Primary | ICD-10-CM

## 2020-10-13 DIAGNOSIS — Z12.39 BREAST CANCER SCREENING OTHER THAN MAMMOGRAM: ICD-10-CM

## 2020-10-13 DIAGNOSIS — R92.2 INCONCLUSIVE MAMMOGRAM DUE TO DENSE BREASTS: ICD-10-CM

## 2020-10-13 DIAGNOSIS — Z80.3 FAMILY HISTORY OF BREAST CANCER IN FEMALE: ICD-10-CM

## 2020-10-13 PROCEDURE — 99213 OFFICE O/P EST LOW 20 MIN: CPT | Performed by: SURGERY

## 2021-05-24 ENCOUNTER — HOSPITAL ENCOUNTER (OUTPATIENT)
Dept: ULTRASOUND IMAGING | Facility: CLINIC | Age: 51
Discharge: HOME | End: 2021-05-24
Payer: COMMERCIAL

## 2021-05-24 VITALS — WEIGHT: 134 LBS | BODY MASS INDEX: 23.74 KG/M2 | HEIGHT: 63 IN

## 2021-05-24 DIAGNOSIS — Z12.39 BREAST CANCER SCREENING OTHER THAN MAMMOGRAM: ICD-10-CM

## 2021-05-24 DIAGNOSIS — R92.2 INCONCLUSIVE MAMMOGRAM DUE TO DENSE BREASTS: ICD-10-CM

## 2021-05-24 PROCEDURE — 76641 ULTRASOUND BREAST COMPLETE: CPT

## 2021-09-17 ENCOUNTER — ULTRASOUND (OUTPATIENT)
Dept: OBGYN CLINIC | Facility: CLINIC | Age: 51
End: 2021-09-17
Payer: COMMERCIAL

## 2021-09-17 ENCOUNTER — ANNUAL EXAM (OUTPATIENT)
Dept: OBGYN CLINIC | Facility: CLINIC | Age: 51
End: 2021-09-17
Payer: COMMERCIAL

## 2021-09-17 VITALS
WEIGHT: 135.8 LBS | HEIGHT: 63 IN | DIASTOLIC BLOOD PRESSURE: 86 MMHG | SYSTOLIC BLOOD PRESSURE: 120 MMHG | BODY MASS INDEX: 24.06 KG/M2

## 2021-09-17 DIAGNOSIS — Z01.419 ENCOUNTER FOR ANNUAL ROUTINE GYNECOLOGICAL EXAMINATION: Primary | ICD-10-CM

## 2021-09-17 DIAGNOSIS — N83.202 LEFT OVARIAN CYST: Primary | ICD-10-CM

## 2021-09-17 DIAGNOSIS — L90.0 LICHEN SCLEROSUS ET ATROPHICUS: ICD-10-CM

## 2021-09-17 PROCEDURE — 76830 TRANSVAGINAL US NON-OB: CPT | Performed by: OBSTETRICS & GYNECOLOGY

## 2021-09-17 PROCEDURE — 99396 PREV VISIT EST AGE 40-64: CPT | Performed by: OBSTETRICS & GYNECOLOGY

## 2021-09-17 RX ORDER — CLOBETASOL PROPIONATE 0.5 MG/G
OINTMENT TOPICAL
Qty: 30 G | Refills: 1 | Status: SHIPPED | OUTPATIENT
Start: 2021-09-17 | End: 2022-04-26

## 2021-09-17 NOTE — PROGRESS NOTES
Assessment/Plan:    pap is up to date    Left ovarian cyst - this is stable  Will recheck in 1 year  She will ask Dr Michelle Pride to copy us on her labs  Mammogram and ABUS reviewed with her including breast density  Mammogram and ABUS ordered by Dr Matthew Thurman  Discussed self breast exams    colon cancer screening - colonoscopy done at 48, recall in 10 years    discussed preventive care, regular exercise and a healthy diet      No problem-specific Assessment & Plan notes found for this encounter  Diagnoses and all orders for this visit:    Encounter for annual routine gynecological examination    Lichen sclerosus et atrophicus  -     clobetasol (TEMOVATE) 0 05 % ointment; Apply sparingly once a week at bedtime          Subjective:      Patient ID: Gabriel Lozano is a 44 y o  female  Pt here for yearly  US shows a stable, small left ovarian cyst, consistent with endometrioma, 1 2cm, right ovary is normal  S/p supracervical hysterectomy  This was done for a large fibroid  Her lichen sclerosis is well controlled with clobetasol  She recently saw Dr Michelle Pride because she feels that she is constantly ovulating  She is going to have saliva testing  She does have some hot flashes but they are well controlled with accupuncture  Normal pap and negative HPV in 2018  Normal ABUS in May  and 3D mammogram in October 2020  She sees Dr Matthew Thurman regularly due to elevated risk  The following portions of the patient's history were reviewed and updated as appropriate: allergies, current medications, past family history, past medical history, past social history, past surgical history and problem list     Review of Systems   Constitutional: Negative  Gastrointestinal: Negative  Genitourinary: Negative            Objective:      /86 (BP Location: Left arm, Patient Position: Sitting, Cuff Size: Standard)   Ht 5' 3" (1 6 m)   Wt 61 6 kg (135 lb 12 8 oz)   LMP 09/09/2017 (Exact Date)   BMI 24 06 kg/m²          Physical Exam  Vitals reviewed  Constitutional:       Appearance: She is well-developed  Neck:      Thyroid: No thyromegaly  Trachea: No tracheal deviation  Cardiovascular:      Rate and Rhythm: Normal rate and regular rhythm  Pulmonary:      Effort: Pulmonary effort is normal       Breath sounds: Normal breath sounds  Chest:      Breasts: Breasts are symmetrical          Right: No inverted nipple, mass, nipple discharge, skin change or tenderness  Left: No inverted nipple, mass, nipple discharge, skin change or tenderness  Abdominal:      General: There is no distension  Palpations: Abdomen is soft  There is no mass  Tenderness: There is no abdominal tenderness  Genitourinary:     Labia:         Right: No rash, tenderness, lesion or injury  Left: No rash, tenderness, lesion or injury  Vagina: Normal       Cervix: Normal       Uterus: Absent  Adnexa:         Right: No mass, tenderness or fullness  Left: No mass, tenderness or fullness          Rectum: Normal       Comments: Cervix is normal, uterus is surgically absent

## 2021-09-17 NOTE — PROGRESS NOTES
AMB US Pelvic Non OB    Date/Time: 9/17/2021 7:17 AM  Performed by: Ivonne Edouard  Authorized by: Theresa Salgado DO   Universal Protocol:  Patient identity confirmed: verbally with patient      Procedure details:     Indications: ovarian cysts      Technique:  Transvaginal US, Non-OB    Position: lithotomy exam    Left ovary findings:     Left ovary:  Visualized  Right ovary findings:     Right ovary:  Visualized  Other findings:     Free pelvic fluid: not identified      Free peritoneal fluid: not identified    Post-Procedure Details:     Impression:  The uterus has been surgically removed  The right ovary appears within normal limits  The left ovary demonstrates a 1 7cm cyst containing debris, most consistent with an endometrioma  No free fluid  Tolerance: Tolerated well, no immediate complications    Complications: no complications    Additional Procedure Comments:      Drivy F8 E8C-RS transvaginal transducer Serial # [id number] was used to perform the examination today and subsequently followed with high level disinfection utilizing Trophon EPR procedure  Ultrasound performed at:     [id number] [address] [address] [facility]  Phone:  [phone]  Fax:  [phone]

## 2021-10-07 ENCOUNTER — HOSPITAL ENCOUNTER (OUTPATIENT)
Dept: MAMMOGRAPHY | Facility: MEDICAL CENTER | Age: 51
Discharge: HOME | End: 2021-10-07
Payer: COMMERCIAL

## 2021-10-07 VITALS — BODY MASS INDEX: 23.92 KG/M2 | HEIGHT: 63 IN | WEIGHT: 135 LBS

## 2021-10-07 DIAGNOSIS — Z12.31 SCREENING MAMMOGRAM, ENCOUNTER FOR: ICD-10-CM

## 2021-10-07 PROCEDURE — 77067 SCR MAMMO BI INCL CAD: CPT

## 2021-10-07 PROCEDURE — 77063 BREAST TOMOSYNTHESIS BI: CPT

## 2021-10-13 ENCOUNTER — OFFICE VISIT (OUTPATIENT)
Dept: SURGICAL ONCOLOGY | Facility: CLINIC | Age: 51
End: 2021-10-13
Payer: COMMERCIAL

## 2021-10-13 VITALS
HEIGHT: 63 IN | RESPIRATION RATE: 16 BRPM | TEMPERATURE: 98.2 F | DIASTOLIC BLOOD PRESSURE: 80 MMHG | HEART RATE: 66 BPM | BODY MASS INDEX: 24.45 KG/M2 | SYSTOLIC BLOOD PRESSURE: 120 MMHG | WEIGHT: 138 LBS | OXYGEN SATURATION: 96 %

## 2021-10-13 DIAGNOSIS — Z80.3 FAMILY HISTORY OF BREAST CANCER IN FEMALE: ICD-10-CM

## 2021-10-13 DIAGNOSIS — R92.2 INCONCLUSIVE MAMMOGRAM DUE TO DENSE BREASTS: ICD-10-CM

## 2021-10-13 DIAGNOSIS — Z91.89 INCREASED RISK OF BREAST CANCER: ICD-10-CM

## 2021-10-13 DIAGNOSIS — N60.19 FIBROCYSTIC BREAST DISEASE (FCBD), UNSPECIFIED LATERALITY: Primary | ICD-10-CM

## 2021-10-13 DIAGNOSIS — Z12.39 BREAST CANCER SCREENING OTHER THAN MAMMOGRAM: ICD-10-CM

## 2021-10-13 PROCEDURE — 99213 OFFICE O/P EST LOW 20 MIN: CPT | Performed by: SURGERY

## 2022-03-14 ENCOUNTER — TELEPHONE (OUTPATIENT)
Dept: HEMATOLOGY ONCOLOGY | Facility: CLINIC | Age: 52
End: 2022-03-14

## 2022-03-14 NOTE — TELEPHONE ENCOUNTER
Called and left message to inform patient she can keep appt with Dr Vincent on 5/9/22 as scheduled and Dr Vincent will review her imaging results after her appt  Left call back in case she has questions

## 2022-03-14 NOTE — TELEPHONE ENCOUNTER
CALL RETURN FORM   Reason for patient call? Needs to reschedule 6mo F/U     Patient's primary oncologist? Dr Thomas Isbell    Name of person the patient was calling for? self   Any additional information to add, if applicable? Patients mammo was rescheduled to 5/9/22 @ 10:30am she usually sees Dr Thomas Isbell 1 week after  There is no availability  On her schedule until  10/19/22   Informed patient that the message will be forwarded to the team and someone will get back to them as soon as possible    Did you relay this information to the patient?  yes

## 2022-03-15 ENCOUNTER — TELEPHONE (OUTPATIENT)
Dept: HEMATOLOGY ONCOLOGY | Facility: CLINIC | Age: 52
End: 2022-03-15

## 2022-03-15 NOTE — TELEPHONE ENCOUNTER
CALL RETURN FORM   Reason for patient call? Patient is unclear about her appointment scheduled 5/9  She states she also has an appointment 5/9 for an ultrasound  Patient is concerned with the way her appointments are set up and would like to speak to Dr Mehran Wood office first before changing or cancelling anything  Patient's primary oncologist? Dr Kelvin Allen   Name of person the patient was calling for? Surgical oncology    Any additional information to add, if applicable? n/a   Informed patient that the message will be forwarded to the team and someone will get back to them as soon as possible    Did you relay this information to the patient?  Yes, patient would like a call back at [phone]

## 2022-03-15 NOTE — TELEPHONE ENCOUNTER
Returned [facility] call and explained we will keep her appt with Dr Vincent as scheduled on 5/9 and Dr Vincent will just review her ultrasound results once they're in her chart  She was agreeable and understood

## 2022-04-05 DIAGNOSIS — Z00.00 ENCOUNTER FOR PREVENTIVE HEALTH EXAMINATION: ICD-10-CM

## 2022-04-26 ENCOUNTER — HOSPITAL ENCOUNTER (OUTPATIENT)
Dept: VASCULAR ULTRASOUND | Facility: HOSPITAL | Age: 52
Discharge: HOME | End: 2022-04-26

## 2022-04-26 ENCOUNTER — HOSPITAL ENCOUNTER (OUTPATIENT)
Dept: NON INVASIVE DIAGNOSTICS | Facility: CLINIC | Age: 52
Discharge: HOME | End: 2022-04-26

## 2022-04-26 ENCOUNTER — APPOINTMENT (OUTPATIENT)
Dept: LAB | Facility: CLINIC | Age: 52
End: 2022-04-26

## 2022-04-26 ENCOUNTER — HOSPITAL ENCOUNTER (OUTPATIENT)
Dept: CT IMAGING | Facility: HOSPITAL | Age: 52
Discharge: HOME | End: 2022-04-26

## 2022-04-26 ENCOUNTER — OFFICE VISIT (OUTPATIENT)
Dept: FAMILY MEDICINE CLINIC | Facility: CLINIC | Age: 52
End: 2022-04-26

## 2022-04-26 ENCOUNTER — HOSPITAL ENCOUNTER (OUTPATIENT)
Dept: ULTRASOUND IMAGING | Facility: HOSPITAL | Age: 52
Discharge: HOME | End: 2022-04-26

## 2022-04-26 ENCOUNTER — APPOINTMENT (OUTPATIENT)
Dept: LAB | Facility: CLINIC | Age: 52
End: 2022-04-26
Payer: COMMERCIAL

## 2022-04-26 VITALS
BODY MASS INDEX: 24.41 KG/M2 | HEART RATE: 66 BPM | WEIGHT: 137.79 LBS | DIASTOLIC BLOOD PRESSURE: 72 MMHG | SYSTOLIC BLOOD PRESSURE: 112 MMHG | HEIGHT: 63 IN

## 2022-04-26 VITALS
HEART RATE: 66 BPM | BODY MASS INDEX: 24.41 KG/M2 | DIASTOLIC BLOOD PRESSURE: 72 MMHG | WEIGHT: 137.8 LBS | HEIGHT: 63 IN | RESPIRATION RATE: 14 BRPM | SYSTOLIC BLOOD PRESSURE: 112 MMHG

## 2022-04-26 DIAGNOSIS — E78.2 MIXED HYPERLIPIDEMIA: ICD-10-CM

## 2022-04-26 DIAGNOSIS — Z00.00 ENCOUNTER FOR PREVENTIVE HEALTH EXAMINATION: ICD-10-CM

## 2022-04-26 DIAGNOSIS — J30.9 ALLERGIC RHINITIS, UNSPECIFIED SEASONALITY, UNSPECIFIED TRIGGER: ICD-10-CM

## 2022-04-26 DIAGNOSIS — Z00.00 WELL ADULT EXAM: Primary | ICD-10-CM

## 2022-04-26 DIAGNOSIS — N83.202 LEFT OVARIAN CYST: ICD-10-CM

## 2022-04-26 DIAGNOSIS — R73.09 ABNORMAL BLOOD SUGAR: ICD-10-CM

## 2022-04-26 DIAGNOSIS — Z87.898 HISTORY OF HEADACHE: ICD-10-CM

## 2022-04-26 DIAGNOSIS — H90.71 MIXED CONDUCTIVE AND SENSORINEURAL HEARING LOSS OF RIGHT EAR, UNSPECIFIED HEARING STATUS ON CONTRALATERAL SIDE: ICD-10-CM

## 2022-04-26 DIAGNOSIS — Z80.3 FAMILY HISTORY OF BREAST CANCER IN FEMALE: ICD-10-CM

## 2022-04-26 DIAGNOSIS — L40.9 PSORIASIS: ICD-10-CM

## 2022-04-26 LAB
25(OH)D3 SERPL-MCNC: 84.2 NG/ML (ref 30–100)
ALBUMIN SERPL BCP-MCNC: 4 G/DL (ref 3.5–5)
ALP SERPL-CCNC: 67 U/L (ref 46–116)
ALT SERPL W P-5'-P-CCNC: 45 U/L (ref 12–78)
ANION GAP SERPL CALCULATED.3IONS-SCNC: 7 MMOL/L (ref 4–13)
AORTIC ROOT: 2.7 CM
APICAL FOUR CHAMBER EJECTION FRACTION: 62 %
ASCENDING AORTA: 3.1 CM (ref 1.86–2.78)
AST SERPL W P-5'-P-CCNC: 23 U/L (ref 5–45)
ATRIAL RATE: 70 BPM
BACTERIA UR QL AUTO: ABNORMAL /HPF
BASELINE ST DEPRESSION: 0 MM
BASOPHILS # BLD AUTO: 0.03 THOUSANDS/ΜL (ref 0–0.1)
BASOPHILS NFR BLD AUTO: 1 % (ref 0–1)
BILIRUB SERPL-MCNC: 0.45 MG/DL (ref 0.2–1)
BILIRUB UR QL STRIP: NEGATIVE
BUN SERPL-MCNC: 15 MG/DL (ref 5–25)
CALCIUM SERPL-MCNC: 9.2 MG/DL (ref 8.3–10.1)
CHLORIDE SERPL-SCNC: 104 MMOL/L (ref 100–108)
CHOLEST SERPL-MCNC: 232 MG/DL
CLARITY UR: CLEAR
CO2 SERPL-SCNC: 29 MMOL/L (ref 21–32)
COLOR UR: YELLOW
CREAT SERPL-MCNC: 0.7 MG/DL (ref 0.6–1.3)
CRP SERPL HS-MCNC: <0.9 MG/L
E WAVE DECELERATION TIME: 223 MS
EOSINOPHIL # BLD AUTO: 0.08 THOUSAND/ΜL (ref 0–0.61)
EOSINOPHIL NFR BLD AUTO: 2 % (ref 0–6)
ERYTHROCYTE [DISTWIDTH] IN BLOOD BY AUTOMATED COUNT: 12.7 % (ref 11.6–15.1)
EST. AVERAGE GLUCOSE BLD GHB EST-MCNC: 117 MG/DL
FRACTIONAL SHORTENING: 40 % (ref 28–44)
GFR SERPL CREATININE-BSD FRML MDRD: 99 ML/MIN/1.73SQ M
GLUCOSE P FAST SERPL-MCNC: 104 MG/DL (ref 65–99)
GLUCOSE UR STRIP-MCNC: NEGATIVE MG/DL
HBA1C MFR BLD: 5.7 %
HCT VFR BLD AUTO: 39.6 % (ref 34.8–46.1)
HDLC SERPL-MCNC: 58 MG/DL
HGB BLD-MCNC: 13.1 G/DL (ref 11.5–15.4)
HGB UR QL STRIP.AUTO: NEGATIVE
IMM GRANULOCYTES # BLD AUTO: 0.02 THOUSAND/UL (ref 0–0.2)
IMM GRANULOCYTES NFR BLD AUTO: 0 % (ref 0–2)
INTERVENTRICULAR SEPTUM IN DIASTOLE (PARASTERNAL SHORT AXIS VIEW): 0.8 CM
INTERVENTRICULAR SEPTUM: 0.8 CM (ref 0.5–0.93)
KETONES UR STRIP-MCNC: NEGATIVE MG/DL
LDLC SERPL CALC-MCNC: 148 MG/DL (ref 0–100)
LEFT ATRIUM AREA SYSTOLE SINGLE PLANE A4C: 9.8 CM2
LEFT ATRIUM SIZE: 3.2 CM
LEFT INTERNAL DIMENSION IN SYSTOLE: 2.7 CM (ref 2.36–3.57)
LEFT VENTRICULAR INTERNAL DIMENSION IN DIASTOLE: 4.5 CM (ref 3.84–5.72)
LEFT VENTRICULAR POSTERIOR WALL IN END DIASTOLE: 0.8 CM (ref 0.48–0.92)
LEFT VENTRICULAR STROKE VOLUME: 62 ML
LEUKOCYTE ESTERASE UR QL STRIP: NEGATIVE
LVSV (TEICH): 62 ML
LYMPHOCYTES # BLD AUTO: 1.09 THOUSANDS/ΜL (ref 0.6–4.47)
LYMPHOCYTES NFR BLD AUTO: 24 % (ref 14–44)
MAX HR PERCENT: 94 %
MAX HR: 142 BPM
MCH RBC QN AUTO: 30.5 PG (ref 26.8–34.3)
MCHC RBC AUTO-ENTMCNC: 33.1 G/DL (ref 31.4–37.4)
MCV RBC AUTO: 92 FL (ref 82–98)
MONOCYTES # BLD AUTO: 0.43 THOUSAND/ΜL (ref 0.17–1.22)
MONOCYTES NFR BLD AUTO: 9 % (ref 4–12)
MV E'TISSUE VEL-SEP: 12 CM/S
MV PEAK A VEL: 0.49 M/S
MV PEAK E VEL: 67 CM/S
MV STENOSIS PRESSURE HALF TIME: 65 MS
MV VALVE AREA P 1/2 METHOD: 3.38 CM2
NEUTROPHILS # BLD AUTO: 2.95 THOUSANDS/ΜL (ref 1.85–7.62)
NEUTS SEG NFR BLD AUTO: 64 % (ref 43–75)
NITRITE UR QL STRIP: NEGATIVE
NON-SQ EPI CELLS URNS QL MICRO: ABNORMAL /HPF
NRBC BLD AUTO-RTO: 0 /100 WBCS
P AXIS: 65 DEGREES
PA SYSTOLIC PRESSURE: 20 MMHG
PH UR STRIP.AUTO: 7.5 [PH]
PLATELET # BLD AUTO: 265 THOUSANDS/UL (ref 149–390)
PMV BLD AUTO: 9.8 FL (ref 8.9–12.7)
POTASSIUM SERPL-SCNC: 4 MMOL/L (ref 3.5–5.3)
PR INTERVAL: 132 MS
PROT SERPL-MCNC: 6.7 G/DL (ref 6.4–8.2)
PROT UR STRIP-MCNC: NEGATIVE MG/DL
QRS AXIS: 75 DEGREES
QRSD INTERVAL: 86 MS
QT INTERVAL: 412 MS
QTC INTERVAL: 444 MS
RATE PRESSURE PRODUCT: NORMAL
RBC # BLD AUTO: 4.3 MILLION/UL (ref 3.81–5.12)
RBC #/AREA URNS AUTO: ABNORMAL /HPF
RIGHT ATRIUM AREA SYSTOLE A4C: 9.5 CM2
RIGHT VENTRICLE ID DIMENSION: 3 CM
SL CV LV EF: 55
SL CV LV EF: 55
SL CV PED ECHO LEFT VENTRICLE DIASTOLIC VOLUME (MOD BIPLANE) 2D: 90 ML
SL CV PED ECHO LEFT VENTRICLE SYSTOLIC VOLUME (MOD BIPLANE) 2D: 28 ML
SL CV STRESS RECOVERY BP: NORMAL MMHG
SL CV STRESS RECOVERY HR: 95 BPM
SL CV STRESS STAGE REACHED: 5
SODIUM SERPL-SCNC: 140 MMOL/L (ref 136–145)
SP GR UR STRIP.AUTO: <=1.005 (ref 1–1.03)
STRESS ANGINA INDEX: 0
STRESS BASELINE BP: NORMAL MMHG
STRESS BASELINE HR: 66 BPM
STRESS DUKE TREADMILL SCORE: 13
STRESS O2 SAT REST: 100 %
STRESS PEAK HR: 157 BPM
STRESS PERCENT HR: 94 %
STRESS POST ESTIMATED WORKLOAD: 14.3 METS
STRESS POST EXERCISE DUR MIN: 12 MIN
STRESS POST EXERCISE DUR SEC: 30 SEC
STRESS POST O2 SAT PEAK: 98 %
STRESS POST PEAK BP: 138 MMHG
STRESS ST DEPRESSION: 0 MM
STRESS TARGET HR: 157 BPM
T WAVE AXIS: 45 DEGREES
TR MAX PG: 15 MMHG
TR PEAK VELOCITY: 2 M/S
TRICUSPID VALVE PEAK REGURGITATION VELOCITY: 1.96 M/S
TRIGL SERPL-MCNC: 132 MG/DL
TSH SERPL DL<=0.05 MIU/L-ACNC: 1.22 UIU/ML (ref 0.45–4.5)
UROBILINOGEN UR QL STRIP.AUTO: 0.2 E.U./DL
VENTRICULAR RATE: 70 BPM
WBC # BLD AUTO: 4.6 THOUSAND/UL (ref 4.31–10.16)
WBC #/AREA URNS AUTO: ABNORMAL /HPF
Z-SCORE OF ASCENDING AORTA: 3.34
Z-SCORE OF INTERVENTRICULAR SEPTUM IN END DIASTOLE: 0.78
Z-SCORE OF LEFT VENTRICULAR DIMENSION IN END DIASTOLE: -0.41
Z-SCORE OF LEFT VENTRICULAR DIMENSION IN END SYSTOLE: -0.58
Z-SCORE OF LEFT VENTRICULAR POSTERIOR WALL IN END DIASTOLE: 0.89

## 2022-04-26 PROCEDURE — 80061 LIPID PANEL: CPT

## 2022-04-26 PROCEDURE — 84443 ASSAY THYROID STIM HORMONE: CPT

## 2022-04-26 PROCEDURE — 93005 ELECTROCARDIOGRAM TRACING: CPT

## 2022-04-26 PROCEDURE — VASC: Performed by: SURGERY

## 2022-04-26 PROCEDURE — 93351 STRESS TTE COMPLETE: CPT | Performed by: INTERNAL MEDICINE

## 2022-04-26 PROCEDURE — 75571 CT HRT W/O DYE W/CA TEST: CPT

## 2022-04-26 PROCEDURE — 93306 TTE W/DOPPLER COMPLETE: CPT | Performed by: INTERNAL MEDICINE

## 2022-04-26 PROCEDURE — 93922 UPR/L XTREMITY ART 2 LEVELS: CPT

## 2022-04-26 PROCEDURE — G1004 CDSM NDSC: HCPCS

## 2022-04-26 PROCEDURE — 76856 US EXAM PELVIC COMPLETE: CPT

## 2022-04-26 PROCEDURE — 85025 COMPLETE CBC W/AUTO DIFF WBC: CPT

## 2022-04-26 PROCEDURE — 76700 US EXAM ABDOM COMPLETE: CPT

## 2022-04-26 PROCEDURE — 81001 URINALYSIS AUTO W/SCOPE: CPT

## 2022-04-26 PROCEDURE — 99499EX: Performed by: FAMILY MEDICINE

## 2022-04-26 PROCEDURE — 82306 VITAMIN D 25 HYDROXY: CPT

## 2022-04-26 PROCEDURE — 86141 C-REACTIVE PROTEIN HS: CPT

## 2022-04-26 PROCEDURE — 86003 ALLG SPEC IGE CRUDE XTRC EA: CPT

## 2022-04-26 PROCEDURE — 36415 COLL VENOUS BLD VENIPUNCTURE: CPT

## 2022-04-26 PROCEDURE — 82785 ASSAY OF IGE: CPT

## 2022-04-26 PROCEDURE — 93010 ELECTROCARDIOGRAM REPORT: CPT | Performed by: INTERNAL MEDICINE

## 2022-04-26 PROCEDURE — 93306 TTE W/DOPPLER COMPLETE: CPT

## 2022-04-26 PROCEDURE — 93350 STRESS TTE ONLY: CPT

## 2022-04-26 PROCEDURE — 80053 COMPREHEN METABOLIC PANEL: CPT

## 2022-04-26 PROCEDURE — 83036 HEMOGLOBIN GLYCOSYLATED A1C: CPT

## 2022-04-26 NOTE — PROGRESS NOTES
Hearing Assessment Summary:     Hearing Screening    125Hz 250Hz 500Hz 1000Hz 2000Hz 3000Hz 4000Hz 6000Hz 8000Hz   Right ear:  25 25 15 20 25 25 30 30   Left ear:  15 15 10 25 30 30 35 30   Comments: HEARING EVALUATION    Name:  Pita Schmitz  :  1970  Age:  46 y o  Date of Evaluation: 22     History: [facility] Exam  Reason for visit: Pita Schmitz is being seen today  for an evaluation of hearing  Patient reports no concern over hearing  She reports intermittent non-lateralizing static sounding tinnitus  She denies ear pain and vertigo, but reports some episodes of syncope (in response to pain) in the past  She notes that she can hear her own breathing in the right ear  EVALUATION:    Otoscopic Evaluation:   Right Ear: clear canal, blood vessel visualized on tympanic membrane   Left Ear: Clear and healthy ear canal and tympanic membrane    Tympanometry:   Right: Type A - normal middle ear pressure and compliance(slightly reduced compliance)   Left: Type A - normal middle ear pressure and compliance    Audiogram Results:    Right Ear: Mild low frequency conductive loss at 250 and 500 Hz, normal 1k-2k Hz sloping to mild sensorineural high frequency hearing loss  Left Ear: Normal through 1k Hz sloping to mild high frequency sensorineural hearing loss  Excellent speech discrimination ability in each ear  *see attached audiogram      RECOMMENDATIONS:  Annual hearing eval, Consult ENT and Copy to Patient/Caregiver    PATIENT EDUCATION:   Discussed results and recommendations with patient  Patient was advised to consult ENT to address mixed loss in right ear as well as report of near syncope during tympanometry in the right ear today  Questions were addressed and the patient was encouraged to contact our department should concerns arise        Darryle Minister, Au D   Clinical Audiologist       Visual Acuity Screening    Right eye Left eye Both eyes   Without correction: With correction: 20/15 20/40 20/15

## 2022-04-26 NOTE — ASSESSMENT & PLAN NOTE
He stated that you have a longstanding history of psoriasis, mostly in your scalp region  I would recommend continued follow-up with your dermatologist as directed 
The ultrasound of the pelvis again showed small left ovarian nodule  This was slightly larger than previous imaging study in 2020  Findings are suggestive endometrioma  I would recommend continued regular follow-up with her gynecologist and consider repeating ultrasound again in 1 year 
You appear to be in good overall health at this time  Your skin exam did not reveal any significant findings  Your labs showed mildly elevated blood sugar and cholesterol, otherwise looked good  Your cardiac testing was negative  And lastly, there were no gross abnormalities on your physical exam  I am glad you have received your COVID vaccination  It appears that you may be due for a tetanus booster shot  I would get this at your PCP's office  I would also consider getting the new shingles vaccine (Shingrix) at some point in the near future  Lastly,  I would recommend that you continue a healthy diet, as well as a regular exercise routine 
You have a family history of breast cancer  Fortunately, your being followed by your gynecologist and a breast care specialist on a regular basis  Recommend continue yearly mammogram/ultrasounds as directed 
You have a history of occasional headaches    Fortunately it seems that they respond to occasional use of butalbital 
You mention that you are having worsening "allergic" symptoms in the past few years  Your symptoms seem to be occurring year round  This raises the possibility of perineal allergies  It appears that you get partial relief of your symptoms from taking zyrtec, flonase, and sudafed  I believe it is reasonable to continue this  If your symptoms worsen, I would consider getting formal allergy testing (either from an allergist or blood testing) 
Your blood sugar is mildly elevated at 104 (normal is less than 100  Your long-term blood sugar (A1C) is also mildly elevated at 5 7% (this should be below 5 6%)  Your labs put you in the category of "prediabetes"  I would recommend carbohydrate reduction (sweets, bread, pasta, potatoes)  Recommend continue regular exercise regimen  Lastly, I would recommend repeating these labs again in 1 year 
Your cholesterol was high at 232 (normal is less than 200)  Also, your good cholesterol (HDL) was relatively high at 58  This helps to protect against the effects of bad cholesterol  Fortunately, your ASCVD risk score (which is 10 year risk of having a cardiovascular event) is relatively low at 1 4%  I would recommend reducing animal based fats in your diet  Continue regular exercise regimen    Repeat labs again in 1 year
Your hearing evaluation today revealed mild hearing loss in your right ear  You also experienced unusual symptoms during your exam  As a precaution, I would like to refer you to an ENT specialist for a 2nd opinion 
Statement Selected

## 2022-04-26 NOTE — PROGRESS NOTES
Fitness Summary and Recommendations:  Ivonne scored at the 45% on her body composition assessment with a bodyfat % of 30 6%  Ivonne scored in the above average range for flexibility with a sit & reach score of 33 cm  Her Muscle Strength/Endurance scores placed her at the 90% (lower body) and 90% (upper body) with a chair stand score of 25 and arm curl score of 24 respectively  Justyn Cardiovascular Score of 51 8 placed her at the 95%  Overall Gopi Spencer would be considered to have an excellent fitness level with an 79% score  Continued emphasis on regular exercise and sound nutrition practices will enable Gopi Spencer to maintain her optimal level of fitness

## 2022-04-26 NOTE — PROGRESS NOTES
[facility] Physical Exam     Shadi Bermudez is a 46 y o  female who is presenting for her first [facility] Physical Exam at [address] [facility] is the  of [address] [place] [address] at [address] [facility] [address]  Her past headaches, perennial allergic rhinitis, psoriasis, lactose intolerance, and gluten sensitivity  Her only prescription medications include you tablets all as needed for headaches and rare use of alprazolam for situational anxiety (she does take a number of OTC vitamins and supplements)  Past surgical history positive for hysterectomy in 2017  Ivonne's family history is significant for breast cancer (on maternal side), hyperlipidemia, psoriasis, bipolar disorder, and glaucoma  She is a nonsmoker  She does drink approximately 2-5 alcoholic beverages per week on average  She drinks 2-3 cups of coffee per day  She considers her diet healthy  She engages in exercise 5-6 days per week (walking, yoga, cycling)  Her major concerns revolve around being sedentary during work hours and stress  Review of Systems   Constitutional: Negative for chills and fever  HENT: Positive for postnasal drip  Negative for sore throat  Eyes: Negative for pain and visual disturbance  Respiratory: Negative for cough and shortness of breath  Cardiovascular: Negative for chest pain and palpitations  Gastrointestinal: Negative for abdominal pain and vomiting  Genitourinary: Negative for dysuria and hematuria  Musculoskeletal: Negative for arthralgias and back pain  Skin: Negative for color change and rash  Neurological: Negative for seizures and syncope  All other systems reviewed and are negative          Active Ambulatory Problems     Diagnosis Date Noted    Leiomyoma of uterus 10/03/2017    Pelvic and perineal pain 10/03/2017    Fibrocystic breast disease     Inconclusive mammogram due to dense breasts  Increased risk of breast cancer     Family history of breast cancer in female     Screening mammogram, encounter for 2018    Well adult exam 10/31/2018    Psoriasis 2022    History of headache 2022    Allergic rhinitis 2022    Mixed hyperlipidemia 2022    Abnormal blood sugar 2022    Left ovarian cyst 2022    Mixed conductive and sensorineural hearing loss of right ear 2022     Resolved Ambulatory Problems     Diagnosis Date Noted    No Resolved Ambulatory Problems     Past Medical History:   Diagnosis Date    Abdominal pain     Abnormal weight gain     Anxiety     Bloating     Bloating     Endometriosis     Enlarged lymph node     Female infertility     Fibroid uterus     Gluten intolerance      1 para 1     Headache     Heartburn     History of early menarche     History of ovarian cyst     Hx of migraines     Increased glucose level     Interstitial cystitis     Intolerance to milk products     Intolerance to milk products     Irregular menstrual cycle     Lactose intolerance     Leiomyoma of uterus, unspecified     Lichen sclerosus et atrophicus     Lichen sclerosus et atrophicus     Lichen simplex chronicus     Lichen simplex chronicus     Lichen simplex chronicus     Migraine     Migraine     Mild heartburn     Ovarian cyst     Pelvic pain in female     PONV (postoperative nausea and vomiting)     RLQ abdominal pain     Simple ovarian cyst     Urinary tract infection     Wears glasses     Yeast infection        Past Surgical History:   Procedure Laterality Date    BREAST BIOPSY Left 2009    OPEN    BREAST BIOPSY  2009    NEEDLE CORE    BREAST SURGERY Bilateral     Breast reduction    BUNIONECTOMY Right     5th toe right foot    CYSTOSCOPY N/A 10/3/2017    Procedure: CYSTOSCOPY;  Surgeon: Leanne Felipe DO;  [facility];  Service: Gynecology    HYSTERECTOMY      supracervical    KNEE ARTHROSCOPY Right     Right outer meniscus    KNEE SURGERY Right     last assessed 12/15/14    LASIK      LASIK      LIPOMA RESECTION      x2 on back    [place] LAP, SUPRACERVIAL HYSTERECTOMY W/ TUBE&OV, <250G N/A 10/3/2017    Procedure: HYSTERECTOMY LAPAROSCOPIC SUPRACERVICAL [facility]);   Surgeon: Brian Jones DO;  [facility];  Service: Gynecology    REDUCTION MAMMAPLASTY Bilateral     SALPINGECTOMY Bilateral 10/3/2017    Procedure: SALPINGECTOMY;  Surgeon: Brian Jones DO;  [facility];  Service: Gynecology    SALPINGECTOMY Left     Unilateral , last assessed 10/19/17    WISDOM TOOTH EXTRACTION         Family History   Problem Relation Age of Onset    Breast cancer Mother 79    Other Mother         Hypoglycemia    Hyperlipidemia Father     Depression Sister     Lung cancer Maternal Grandmother 80    Breast cancer Maternal Grandmother 76    Breast cancer Paternal Grandmother 54    Prostate cancer Maternal Uncle 54    No Known Problems Paternal Grandfather     Prostate cancer Maternal Grandfather 61       Social History     Tobacco Use   Smoking Status Never Smoker   Smokeless Tobacco Never Used         Current Outpatient Medications:     Acidophilus Lactobacillus CAPS, Take 1 capsule by mouth daily, Disp: , Rfl:     ALPRAZolam (XANAX) 0 25 mg tablet, Xanax, Disp: , Rfl:     Ascorbic Acid (VITAMIN C) 1000 MG tablet, Take 1,000 mg by mouth daily, Disp: , Rfl:     butalbital-acetaminophen-caffeine (FIORICET,ESGIC) -40 mg per tablet, butalbital-acetaminophen-caffeine 50 mg-325 mg-40 mg tablet, Disp: , Rfl:     calcium carbonate (OS-FREDI) 600 MG tablet, Take 600 mg by mouth daily With magnesium, Disp: , Rfl:     clobetasol (TEMOVATE) 0 05 % cream, Apply sparingly once or twice a week (Patient not taking: Reported on 10/13/2021), Disp: 45 g, Rfl: 1    co-enzyme Q-10 30 MG capsule, Take 30 mg by mouth 3 (three) times a day, Disp: , Rfl:     Melatonin 1 MG CAPS, Take 3 mg by mouth, Disp: , Rfl:     Multiple Vitamin (MULTIVITAMIN) capsule, Take 1 capsule by mouth daily, Disp: , Rfl:     Multiple Vitamins-Minerals (MULTIVITAMIN ADULT EXTRA C PO), multivitamin, Disp: , Rfl:     Allergies   Allergen Reactions    Nickel Hives and Rash     From plated jewelry or any sort    Percocet [Oxycodone-Acetaminophen] Other (See Comments)     Jittery and teeth chatter    Sulfa Antibiotics GI Intolerance and Hives     Severe nausea  "Nausea T constipation"    Wound Dressing Adhesive Rash    Gluten Meal - Food Allergy GI Intolerance     Negative for celiac disease    Lactose - Food Allergy GI Intolerance    Latex      Patient unsure if it was surgical glue or steristrips that were used on knee after surgery that redness and itching  States her surgeon told her to tell people she has a Latex allergy    Septra [Sulfamethoxazole-Trimethoprim]          Objective:    Vitals:    04/26/22 1000   BP: 112/72   Pulse: 66   Resp: 14   Weight: 62 5 kg (137 lb 12 8 oz)   Height: 5' 3 25" (1 607 m)        Physical Exam  Vitals and nursing note reviewed  Constitutional:       Appearance: She is well-developed  HENT:      Head: Normocephalic and atraumatic  Right Ear: External ear normal       Left Ear: External ear normal       Nose:      Comments: Nose: turbinates mildly inflamed  Post pharynx w/ cobblestoning  Eyes:      Pupils: Pupils are equal, round, and reactive to light  Cardiovascular:      Rate and Rhythm: Normal rate and regular rhythm  Heart sounds: Normal heart sounds  Pulmonary:      Effort: Pulmonary effort is normal       Breath sounds: Normal breath sounds  Abdominal:      General: Bowel sounds are normal       Palpations: Abdomen is soft  Musculoskeletal:      Cervical back: Normal range of motion and neck supple  Neurological:      General: No focal deficit present  Mental Status: She is alert and oriented to person, place, and time  Cranial Nerves:  No cranial nerve deficit  Sensory: No sensory deficit  Motor: No weakness  Coordination: Coordination normal       Gait: Gait normal       Deep Tendon Reflexes: Reflexes are normal and symmetric  Psychiatric:         Mood and Affect: Mood normal          Behavior: Behavior normal          Thought Content: Thought content normal          Judgment: Judgment normal              Assessment/Plan:     Here are the findings from today's exam:(also see cardiology and dermatology reports)        1  Well adult exam  Assessment & Plan: You appear to be in good overall health at this time  Your skin exam did not reveal any significant findings  Your labs showed mildly elevated blood sugar and cholesterol, otherwise looked good  Your cardiac testing was negative  And lastly, there were no gross abnormalities on your physical exam  I am glad you have received your COVID vaccination  It appears that you may be due for a tetanus booster shot  I would get this at your PCP's office  I would also consider getting the new shingles vaccine (Shingrix) at some point in the near future  Lastly,  I would recommend that you continue a healthy diet, as well as a regular exercise routine  2  Mixed hyperlipidemia  Assessment & Plan: Your cholesterol was high at 232 (normal is less than 200)  Also, your good cholesterol (HDL) was relatively high at 58  This helps to protect against the effects of bad cholesterol  Fortunately, your ASCVD risk score (which is 10 year risk of having a cardiovascular event) is relatively low at 1 4%  I would recommend reducing animal based fats in your diet  Continue regular exercise regimen  Repeat labs again in 1 year      3  Abnormal blood sugar  Assessment & Plan: Your blood sugar is mildly elevated at 104 (normal is less than 100  Your long-term blood sugar (A1C) is also mildly elevated at 5 7% (this should be below 5 6%)  Your labs put you in the category of "prediabetes"    I would recommend carbohydrate reduction (sweets, bread, pasta, potatoes)  Recommend continue regular exercise regimen  Lastly, I would recommend repeating these labs again in 1 year  4  Left ovarian cyst  Assessment & Plan:  The ultrasound of the pelvis again showed small left ovarian nodule  This was slightly larger than previous imaging study in 2020  Findings are suggestive endometrioma  I would recommend continued regular follow-up with her gynecologist and consider repeating ultrasound again in 1 year  5  Mixed conductive and sensorineural hearing loss of right ear, unspecified hearing status on contralateral side  Assessment & Plan: Your hearing evaluation today revealed mild hearing loss in your right ear  You also experienced unusual symptoms during your exam  As a precaution, I would like to refer you to an ENT specialist for a 2nd opinion  Orders:  -     Ambulatory Referral to Otolaryngology; Future    6  Allergic rhinitis, unspecified seasonality, unspecified trigger  Assessment & Plan: You mention that you are having worsening "allergic" symptoms in the past few years  Your symptoms seem to be occurring year round  This raises the possibility of perineal allergies  It appears that you get partial relief of your symptoms from taking zyrtec, flonase, and sudafed  I believe it is reasonable to continue this  If your symptoms worsen, I would consider getting formal allergy testing (either from an allergist or blood testing)  7  Family history of breast cancer in female  Assessment & Plan:  You have a family history of breast cancer  Fortunately, your being followed by your gynecologist and a breast care specialist on a regular basis  Recommend continue yearly mammogram/ultrasounds as directed  8  Psoriasis  Assessment & Plan:  He stated that you have a longstanding history of psoriasis, mostly in your scalp region    I would recommend continued follow-up with your dermatologist as directed  9  History of headache  Assessment & Plan:  You have a history of occasional headaches  Fortunately it seems that they respond to occasional use of butalbital     Derick He,    Thank you for choosing [address] [place] [address] Dr Deras  It was a pleasure meeting and getting to know you today  If you have any questions regarding today's exam, feel free to contact me  We hope to see you again in the future  Willy Aleman ([address]  Physician)  [phone] (cell)  [email or web address]

## 2022-04-26 NOTE — PROGRESS NOTES
Nutritional Summary and Recommendations:     Patient Nutrition-Oriented Medical/Diet Hx  Dai Swainh presents today to [facility] for nutrition assessment and counseling  Dai Ying reports recently re-starting weight watchers due to success in the past for weight loss  Discussion focused on incorporating complex carbohydrates with lunch and increasing calories in breakfast protein shake  Labs reviewed  Pt reports hx of elevated A1C and cholesterol which have stayed the same from previous  Reviewed complex vs simple carbohydrates and saturated fats              Current Outpatient Medications:  Omega 3, MVI, Vitamin D, Biotin, CoQ10, B complex, Vitamin C, Magnesium, Elderberry     Past Medical History:  Past Medical History:   Diagnosis Date    Abdominal pain     Abnormal weight gain     Anxiety     Bloating     Last assessed: 13    Bloating     Endometriosis     Enlarged lymph node     Female infertility     Fibroid uterus     Gluten intolerance      1 para 1     Headache     Heartburn     History of early menarche     History of ovarian cyst     Hx of migraines     None recently    Inconclusive mammogram due to dense breasts     Increased glucose level     Increased risk of breast cancer     Interstitial cystitis     Medications no longer needed    Intolerance to milk products     Intolerance to milk products     Irregular menstrual cycle     Lactose intolerance     Leiomyoma of uterus, unspecified     Lichen sclerosus et atrophicus     lastv assessed     Lichen sclerosus et atrophicus     Lichen simplex chronicus     Lichen simplex chronicus     Lichen simplex chronicus     Migraine     Migraine     Mild heartburn     Ovarian cyst     Last assessed:2015    Pelvic pain in female     PONV (postoperative nausea and vomiting)     Psoriasis     Currently on scalp    Psoriasis     Psoriasis     RLQ abdominal pain     Simple ovarian cyst     Urinary tract infection     Wears glasses     Yeast infection         Labs:  A1C 5 7  Cholesterol: 232  Triglycerides: 132  HDL: 58  LDL: 148  Vitamin D: 84 2      Anthropometrics:     Ht: 5'3"     Wt: 135 6 lb     BMI: 24     UBW: 135-137 lb     DBW: 128-130 lb      BMR: 1294 kcals     Fat%: 30 6 %     Fat Mass: 41 4 lb     FFM: 94 2 lb     TBW: 69 lb     Nutrition Diagnosis:  Altered nutrition related lab values r/t physiological issues as evidenced by A1C 5 7        Nutrition Interventions/Recommendations:  1  Plan well-balanced meals to incorporate lean proteins, complex carbohydrates, fruits/vegetables, and heart healthy fats  2  Incorporate more calories into daily protein shake if used for meal replacement  3  Eat a complex carbohydrate such as quinoa at lunch time  4  Reach out to RD with any questions or concerns            Perceived Comprehension:  Good     Expected Compliance:  Good

## 2022-04-27 LAB
A ALTERNATA IGE QN: <0.1 KUA/I
A FUMIGATUS IGE QN: <0.1 KUA/I
BERMUDA GRASS IGE QN: <0.1 KUA/I
BOXELDER IGE QN: <0.1 KUA/I
C HERBARUM IGE QN: <0.1 KUA/I
CAT DANDER IGE QN: <0.1 KUA/I
CHEST PAIN STATEMENT: NORMAL
CMN PIGWEED IGE QN: <0.1 KUA/I
COMMON RAGWEED IGE QN: <0.1 KUA/I
COTTONWOOD IGE QN: <0.1 KUA/I
D FARINAE IGE QN: <0.1 KUA/I
D PTERONYSS IGE QN: <0.1 KUA/I
DOG DANDER IGE QN: <0.1 KUA/I
LONDON PLANE IGE QN: <0.1 KUA/I
MAX DIASTOLIC BP: 72 MMHG
MAX HEART RATE: 157 BPM
MAX PREDICTED HEART RATE: 167 BPM
MAX. SYSTOLIC BP: 138 MMHG
MOUSE URINE PROT IGE QN: <0.1 KUA/I
MT JUNIPER IGE QN: <0.1 KUA/I
MUGWORT IGE QN: <0.1 KUA/I
P NOTATUM IGE QN: <0.1 KUA/I
PROTOCOL NAME: NORMAL
REASON FOR TERMINATION: NORMAL
ROACH IGE QN: <0.1 KUA/I
SHEEP SORREL IGE QN: <0.1 KUA/I
SILVER BIRCH IGE QN: <0.1 KUA/I
TARGET HR FORMULA: NORMAL
TEST INDICATION: NORMAL
TIME IN EXERCISE PHASE: NORMAL
TIMOTHY IGE QN: <0.1 KUA/I
TOTAL IGE SMQN RAST: 3.54 KU/L (ref 0–113)
WALNUT IGE QN: <0.1 KUA/I
WHITE ASH IGE QN: <0.1 KUA/I
WHITE ELM IGE QN: <0.1 KUA/I
WHITE MULBERRY IGE QN: <0.1 KUA/I
WHITE OAK IGE QN: <0.1 KUA/I

## 2022-05-09 ENCOUNTER — HOSPITAL ENCOUNTER (OUTPATIENT)
Dept: ULTRASOUND IMAGING | Facility: CLINIC | Age: 52
Discharge: HOME | End: 2022-05-09
Payer: COMMERCIAL

## 2022-05-09 ENCOUNTER — TELEPHONE (OUTPATIENT)
Dept: SURGICAL ONCOLOGY | Facility: CLINIC | Age: 52
End: 2022-05-09

## 2022-05-09 DIAGNOSIS — Z12.39 BREAST CANCER SCREENING OTHER THAN MAMMOGRAM: ICD-10-CM

## 2022-05-09 DIAGNOSIS — R92.2 INCONCLUSIVE MAMMOGRAM DUE TO DENSE BREASTS: ICD-10-CM

## 2022-05-09 PROCEDURE — 76641 ULTRASOUND BREAST COMPLETE: CPT

## 2022-05-09 NOTE — TELEPHONE ENCOUNTER
Called patient and left a message offering to reschedule her appointment with Dr Sarita Pino that was cancelled today  Requested she return call directly to me so I may assist her in doing     Patient returned call and is now rescheduled with Mariella Pierre on 5/16/22 at 11:30am at the [facility]

## 2022-05-16 ENCOUNTER — OFFICE VISIT (OUTPATIENT)
Dept: SURGICAL ONCOLOGY | Facility: CLINIC | Age: 52
End: 2022-05-16
Payer: COMMERCIAL

## 2022-05-16 VITALS
BODY MASS INDEX: 23.78 KG/M2 | WEIGHT: 134.2 LBS | HEART RATE: 68 BPM | TEMPERATURE: 98.5 F | SYSTOLIC BLOOD PRESSURE: 114 MMHG | DIASTOLIC BLOOD PRESSURE: 62 MMHG | HEIGHT: 63 IN | RESPIRATION RATE: 17 BRPM | OXYGEN SATURATION: 97 %

## 2022-05-16 DIAGNOSIS — Z12.31 VISIT FOR SCREENING MAMMOGRAM: ICD-10-CM

## 2022-05-16 DIAGNOSIS — R92.8 ABNORMAL FINDING ON BREAST IMAGING: ICD-10-CM

## 2022-05-16 DIAGNOSIS — Z80.3 FAMILY HISTORY OF BREAST CANCER IN FEMALE: ICD-10-CM

## 2022-05-16 DIAGNOSIS — N60.19 FIBROCYSTIC BREAST DISEASE (FCBD), UNSPECIFIED LATERALITY: ICD-10-CM

## 2022-05-16 DIAGNOSIS — Z91.89 INCREASED RISK OF BREAST CANCER: Primary | ICD-10-CM

## 2022-05-16 PROCEDURE — 1036F TOBACCO NON-USER: CPT | Performed by: NURSE PRACTITIONER

## 2022-05-16 PROCEDURE — 3008F BODY MASS INDEX DOCD: CPT | Performed by: NURSE PRACTITIONER

## 2022-05-16 PROCEDURE — 99213 OFFICE O/P EST LOW 20 MIN: CPT | Performed by: NURSE PRACTITIONER

## 2022-05-16 NOTE — PROGRESS NOTES
Surgical Oncology Follow Up       [facility]  [facility] [id number]    Ray Siddiqui  1970  [id number]      Chief Complaint   Patient presents with    Follow-up       Assessment/Plan:  1  Increased risk of breast cancer  - Continue annual 3d mammography, annual ABUS, CBE q 6 mo    2  Fibrocystic breast disease (FCBD), unspecified laterality    3  Family history of breast cancer in female    3  Visit for screening mammogram  - Mammo screening bilateral w 3d & cad; Future    5  Abnormal finding on breast imaging  - diagnostic u/s scheduled 6/2        Discussion/Summary:  Patient is a 56-year-old female that presents today for follow-up visit for a family history of breast cancer, increased risk of breast cancer and fibrocystic changes  Have been screening her with annual 3D mammography and annual automated breast ultrasounds, clinical breast exams every 6 months  She had a bilateral mammogram in October which was BI-RADS 1, category 3 density  She had a recent automated breast ultrasound which revealed a possible mass at the 4 o'clock position of the left breast   She has already been ordered/scheduled for a diagnostic ultrasound  Patient reports no changes on her self breast exam and there are no concerns on exam today  Assuming her diagnostic workup is benign, we will plan to see her back in 1 year as she will receive a clinical breast exam with her gynecologist in the interim  I will make arrangements for her mammogram   She was instructed to call with any new concerns prior to her next visit  All of her questions were answered today  History of Present Illness:     -Interval History:  Patient presents today for follow-up visit for a family history breast cancer, fibrocystic changes    She had an automated breast ultrasound performed on 05/09 which revealed a possible hypoechoic mass at the 4 o'clock position of the left breast   She is scheduled for a diagnostic ultrasound on   She notices no changes on her self breast exam   Reports no changes in her family history  Review of Systems:  Review of Systems   Constitutional: Negative for chills, fatigue and fever  Respiratory: Negative for cough and shortness of breath  Cardiovascular: Negative for chest pain  Hematological: Negative for adenopathy  Psychiatric/Behavioral: Negative for confusion         Patient Active Problem List   Diagnosis    Leiomyoma of uterus    Pelvic and perineal pain    Fibrocystic breast disease    Inconclusive mammogram due to dense breasts    Increased risk of breast cancer    Family history of breast cancer in female    Screening mammogram, encounter for    Well adult exam    Psoriasis    History of headache    Allergic rhinitis    Mixed hyperlipidemia    Abnormal blood sugar    Left ovarian cyst    Mixed conductive and sensorineural hearing loss of right ear     Past Medical History:   Diagnosis Date    Abdominal pain     Abnormal weight gain     Anxiety     Bloating     Last assessed: 13    Bloating     Endometriosis     Enlarged lymph node     Female infertility     Fibroid uterus     Gluten intolerance      1 para 1     Headache     Heartburn     History of early menarche     History of ovarian cyst     Hx of migraines     None recently    Inconclusive mammogram due to dense breasts     Increased glucose level     Increased risk of breast cancer     Interstitial cystitis     Medications no longer needed    Intolerance to milk products     Intolerance to milk products     Irregular menstrual cycle     Lactose intolerance     Leiomyoma of uterus, unspecified     Lichen sclerosus et atrophicus     lastv assessed     Lichen sclerosus et atrophicus     Lichen simplex chronicus     Lichen simplex chronicus     Lichen simplex chronicus     Migraine     Migraine     Mild heartburn     Ovarian cyst     Last assessed:01/14/2015    Pelvic pain in female     PONV (postoperative nausea and vomiting)     Psoriasis     Currently on scalp    Psoriasis     Psoriasis     RLQ abdominal pain     Simple ovarian cyst     Urinary tract infection     Wears glasses     Yeast infection      Past Surgical History:   Procedure Laterality Date    BREAST BIOPSY Left 11/20/2009    OPEN    BREAST BIOPSY  01/06/2009    NEEDLE CORE    BREAST SURGERY Bilateral     Breast reduction    BUNIONECTOMY Right     5th toe right foot    CYSTOSCOPY N/A 10/3/2017    Procedure: CYSTOSCOPY;  Surgeon: Yennifer Barrera DO;  [facility];  Service: Gynecology    HYSTERECTOMY      supracervical    KNEE ARTHROSCOPY Right     Right outer meniscus    KNEE SURGERY Right     last assessed 12/15/14    LASIK      LASIK      LIPOMA RESECTION      x2 on back    DE LAP, SUPRACERVIAL HYSTERECTOMY W/ TUBE&OV, <250G N/A 10/3/2017    Procedure: HYSTERECTOMY LAPAROSCOPIC SUPRACERVICAL [facility]);   Surgeon: Yennifer Barrera DO;  [facility];  Service: Gynecology    REDUCTION MAMMAPLASTY Bilateral     SALPINGECTOMY Bilateral 10/3/2017    Procedure: SALPINGECTOMY;  Surgeon: Yennifer Barrera DO;  [facility];  Service: Gynecology    SALPINGECTOMY Left     Unilateral , last assessed 10/19/17    WISDOM TOOTH EXTRACTION       Family History   Problem Relation Age of Onset    Breast cancer Mother 79    Other Mother         Hypoglycemia    Hyperlipidemia Father     Depression Sister     Lung cancer Maternal Grandmother 80    Breast cancer Maternal Grandmother 76    Breast cancer Paternal Grandmother 54    Prostate cancer Maternal Uncle 54    No Known Problems Paternal Grandfather     Prostate cancer Maternal Grandfather 61     Social History     Socioeconomic History    Marital status:      Spouse name: Not on file    Number of children: Not on file    Years of education: Not on file   Ankita Barros Highest education level: Not on file   Occupational History    Not on file   Tobacco Use    Smoking status: Never Smoker    Smokeless tobacco: Never Used   Vaping Use    Vaping Use: Never used   Substance and Sexual Activity    Alcohol use: Yes     Comment: 3 or 4 weekly    Drug use: No    Sexual activity: Yes     Partners: Male   Other Topics Concern    Not on file   Social History Narrative    Caffeine use    Uses safety equipment-seatbelts        Denied History of     Caodaism Affiliation None     Social Determinants of Health     Financial Resource Strain: Not on file   Food Insecurity: Not on file   Transportation Needs: Not on file   Physical Activity: Not on file   Stress: Not on file   Social Connections: Not on file   Intimate Partner Violence: Not on file   Housing Stability: Not on file       Current Outpatient Medications:     Acidophilus Lactobacillus CAPS, Take 1 capsule by mouth daily, Disp: , Rfl:     ALPRAZolam (XANAX) 0 25 mg tablet, Xanax, Disp: , Rfl:     Ascorbic Acid (VITAMIN C) 1000 MG tablet, Take 1,000 mg by mouth daily, Disp: , Rfl:     butalbital-acetaminophen-caffeine (FIORICET,ESGIC) -40 mg per tablet, butalbital-acetaminophen-caffeine 50 mg-325 mg-40 mg tablet, Disp: , Rfl:     calcium carbonate (OS-FREDI) 600 MG tablet, Take 600 mg by mouth in the morning  With magnesium   , Disp: , Rfl:     co-enzyme Q-10 30 MG capsule, Take 30 mg by mouth 3 (three) times a day, Disp: , Rfl:     Melatonin 1 MG CAPS, Take 3 mg by mouth, Disp: , Rfl:     Multiple Vitamin (MULTIVITAMIN) capsule, Take 1 capsule by mouth daily, Disp: , Rfl:     Multiple Vitamins-Minerals (MULTIVITAMIN ADULT EXTRA C PO), multivitamin, Disp: , Rfl:     clobetasol (TEMOVATE) 0 05 % cream, Apply sparingly once or twice a week (Patient not taking: No sig reported), Disp: 45 g, Rfl: 1  Allergies   Allergen Reactions    Nickel Hives and Rash     From plated jewelry or any sort    Percocet [Oxycodone-Acetaminophen] Other (See Comments)     Jittery and teeth chatter    Sulfa Antibiotics GI Intolerance and Hives     Severe nausea  "Nausea T constipation"    Wound Dressing Adhesive Rash    Gluten Meal - Food Allergy GI Intolerance     Negative for celiac disease    Lactose - Food Allergy GI Intolerance    Latex      Patient unsure if it was surgical glue or steristrips that were used on knee after surgery that redness and itching  States her surgeon told her to tell people she has a Latex allergy    Septra [Sulfamethoxazole-Trimethoprim]      Vitals:    05/16/22 1122   BP: 114/62   Pulse: 68   Resp: 17   Temp: 98 5 °F (36 9 °C)   SpO2: 97%       Physical Exam  Vitals reviewed  Constitutional:       Appearance: Normal appearance  HENT:      Head: Normocephalic and atraumatic  Pulmonary:      Effort: Pulmonary effort is normal    Chest:   Breasts:      Right: Skin change (reduction scars) present  No swelling, bleeding, inverted nipple, mass, nipple discharge, tenderness, axillary adenopathy or supraclavicular adenopathy  Left: Skin change (reduction scars) present  No swelling, bleeding, inverted nipple, mass, nipple discharge, tenderness, axillary adenopathy or supraclavicular adenopathy  Comments: Dense tissue noted in lateral aspect of left breast but no dominant masses  Lymphadenopathy:      Upper Body:      Right upper body: No supraclavicular or axillary adenopathy  Left upper body: No supraclavicular or axillary adenopathy  Neurological:      General: No focal deficit present  Mental Status: She is alert and oriented to person, place, and time     Psychiatric:         Mood and Affect: Mood normal            Results:    Imaging    US breast screening bilateral complete (ABUS)    Result Date: 5/11/2022  Narrative: DIAGNOSIS: Inconclusive mammogram due to dense breasts; Breast cancer screening other than mammogram TECHNIQUE: Automated breast ultrasound images were obtained on the [employer] system  Imaging was obtained in standard projections including AP, lateral and medial for both breasts, inclusive of all four quadrants  COMPARISONS: Prior breast imaging dated: 10/07/2021, 05/24/2021, 10/06/2020, 05/22/2020, 10/03/2019, and 04/05/2019 RELEVANT HISTORY: Family Breast Cancer History: History of breast cancer in Mother, Maternal Grandmother, Paternal Grandmother  Family Medical History: Family medical history includes breast cancer in 3 relatives (maternal grandmother, mother, paternal grandmother)  Personal History: Hormone history includes birth control and other  Surgical history includes breast biopsy, breast reduction, and hysterectomy  No known relevant medical history  RISK ASSESSMENT: 5 Year Tyrer-Cuzick: 3 52 % 10 Year Tyrer-Cuzick: 7 57 % Lifetime Tyrer-Cuzick: 26 49 % TISSUE COMPOSITION: Heterogeneous background echotexture INDICATION: Raul Gonzalez is a 46 y o  female with dense breasts presenting for automated breast ultrasound screening  FINDINGS: There is a possible hypoechoic mass left breast 04:00 o'clock 6 cm from the nipple  Otherwise, there are no suspicious masses or unexplained areas of architectural distortion  Postoperative changes from prior bilateral reduction mammoplasty are noted  Impression:  Possible hypoechoic mass left breast 04:00 o'clock for which hand-held ultrasound is recommended  Automated breast ultrasound is an adjunct, not a replacement, for routine yearly screening mammography  ASSESSMENT/BI-RADS CATEGORY: Left: 0 - Incomplete: Needs Additional Imaging Evaluation Right: 2 - Benign Overall: 0 - Incomplete: Needs Additional Imaging Evaluation RECOMMENDATION:      - Ultrasound at the current time for the left breast       - Return to routine screening for the right breast  Workstation ID: [id number] Planning/Advance Directives:  Discussed disease status and treatment goals with the patient

## 2022-05-24 PROBLEM — H69.80 DYSFUNCTION OF EUSTACHIAN TUBE: Status: ACTIVE | Noted: 2022-05-24

## 2022-05-24 PROBLEM — K21.9 GASTROESOPHAGEAL REFLUX DISEASE WITHOUT ESOPHAGITIS: Status: ACTIVE | Noted: 2022-05-24

## 2022-05-24 PROBLEM — R42 DIZZINESS: Status: ACTIVE | Noted: 2022-05-24

## 2022-05-24 PROBLEM — H61.22 CERUMEN DEBRIS ON TYMPANIC MEMBRANE OF LEFT EAR: Status: ACTIVE | Noted: 2022-05-24

## 2022-05-24 PROBLEM — H69.90 DYSFUNCTION OF EUSTACHIAN TUBE: Status: ACTIVE | Noted: 2022-05-24

## 2022-05-27 ENCOUNTER — HOSPITAL ENCOUNTER (OUTPATIENT)
Dept: ULTRASOUND IMAGING | Facility: CLINIC | Age: 52
Discharge: HOME | End: 2022-05-27
Payer: COMMERCIAL

## 2022-05-27 ENCOUNTER — HOSPITAL ENCOUNTER (OUTPATIENT)
Dept: MAMMOGRAPHY | Facility: CLINIC | Age: 52
Discharge: HOME | End: 2022-05-27
Payer: COMMERCIAL

## 2022-05-27 VITALS — HEIGHT: 63 IN | WEIGHT: 134.04 LBS | BODY MASS INDEX: 23.75 KG/M2

## 2022-05-27 DIAGNOSIS — R92.8 ABNORMAL ULTRASOUND OF BREAST: ICD-10-CM

## 2022-05-27 DIAGNOSIS — R92.8 ABNORMAL MAMMOGRAM: ICD-10-CM

## 2022-05-27 PROCEDURE — 77065 DX MAMMO INCL CAD UNI: CPT

## 2022-05-27 PROCEDURE — G0279 TOMOSYNTHESIS, MAMMO: HCPCS

## 2022-05-27 PROCEDURE — 76642 ULTRASOUND BREAST LIMITED: CPT

## 2022-10-06 ENCOUNTER — ULTRASOUND (OUTPATIENT)
Dept: GYNECOLOGY | Facility: CLINIC | Age: 52
End: 2022-10-06
Payer: COMMERCIAL

## 2022-10-06 ENCOUNTER — ANNUAL EXAM (OUTPATIENT)
Dept: GYNECOLOGY | Facility: CLINIC | Age: 52
End: 2022-10-06
Payer: COMMERCIAL

## 2022-10-06 VITALS
SYSTOLIC BLOOD PRESSURE: 114 MMHG | DIASTOLIC BLOOD PRESSURE: 70 MMHG | HEIGHT: 63 IN | WEIGHT: 138.8 LBS | BODY MASS INDEX: 24.59 KG/M2

## 2022-10-06 DIAGNOSIS — Z12.4 CERVICAL CANCER SCREENING: Primary | ICD-10-CM

## 2022-10-06 DIAGNOSIS — N94.89 ADNEXAL MASS: ICD-10-CM

## 2022-10-06 DIAGNOSIS — Z01.419 ENCOUNTER FOR ANNUAL ROUTINE GYNECOLOGICAL EXAMINATION: ICD-10-CM

## 2022-10-06 DIAGNOSIS — Z11.51 SCREENING FOR HPV (HUMAN PAPILLOMAVIRUS): ICD-10-CM

## 2022-10-06 DIAGNOSIS — L90.0 LICHEN SCLEROSUS ET ATROPHICUS: ICD-10-CM

## 2022-10-06 DIAGNOSIS — N83.202 LEFT OVARIAN CYST: Primary | ICD-10-CM

## 2022-10-06 PROCEDURE — 76830 TRANSVAGINAL US NON-OB: CPT | Performed by: OBSTETRICS & GYNECOLOGY

## 2022-10-06 PROCEDURE — S0612 ANNUAL GYNECOLOGICAL EXAMINA: HCPCS | Performed by: OBSTETRICS & GYNECOLOGY

## 2022-10-06 RX ORDER — CLOBETASOL PROPIONATE 0.5 MG/G
OINTMENT TOPICAL
Qty: 30 G | Refills: 1 | Status: SHIPPED | OUTPATIENT
Start: 2022-10-06

## 2022-10-06 NOTE — PROGRESS NOTES
Assessment/Plan:    pap and HPV done today    mammogram reviewed with her including breast density  These are ordered by Dr Jesús Suggs office and she has them scheduled  Discussed self breast exams    Left ovarian cyst -  There are two septations in the cyst, they do not appear thick  Blood flow is normal    This does not appear to be the same cyst as we were following before  I reviewed this with her in detail, discussed risks and benefits of   This was ordered and will recheck US in 6 weeks  She is asymptomatic although stated that she felt some pressure on the left during the exam   She is aware to call with any changes in symptoms  Lichen sclerosus - RX clobetasol ointment   She will try to use this once a week for better relief  colon cancer screening - colonoscopy done at age 46, recall in 8 years    discussed preventive care, regular exercise and a healthy diet    No problem-specific Assessment & Plan notes found for this encounter  Diagnoses and all orders for this visit:    Cervical cancer screening  -     Thinprep Tis and HPV mRNA E6/E7    Adnexal mass  -     ; Future    Lichen sclerosus et atrophicus  -     clobetasol (TEMOVATE) 0 05 % ointment; Apply once a week    Screening for HPV (human papillomavirus)  -     Thinprep Tis and HPV mRNA E6/E7    Other orders  -     progesterone (ENDOMETRIN) 100 MG vaginal insert; Insert 130 mg into the vagina daily          Subjective:      Patient ID: Thanh Bueno is a 45 y o  female  Pt here for yearly  She has no gyn complaints  We were watching a small 1 7 cm cyst on the left ovary possible hemorrhagic cyst or endometrioma  Today, [address] shows a 4 5cm septated cyst that does not appear to be the same one that she has had  Right ovary is normal   She has some mild discomfort on the left side      Normal Pap, negative HPV in March 2018    S/p supracervical hysterectomy for a large fibroid, ovaries retained      She sees   Komal KeepNavarik and is on Biest 0 35 mg and progesterone (micronized) 130 mg at bedtime   she has had great relief from vasomotor symptoms for this  She sees breast surgery for her mammograms due to dense tissue and elevated risk  She gets 3D mammogram and ABUS  The following portions of the patient's history were reviewed and updated as appropriate: allergies, current medications, past family history, past medical history, past social history, past surgical history and problem list     Review of Systems   Constitutional: Negative  Gastrointestinal: Negative  Genitourinary: Negative  Objective:      /70 (BP Location: Left arm, Patient Position: Sitting, Cuff Size: Standard)   Ht 5' 3" (1 6 m)   Wt 63 kg (138 lb 12 8 oz)   LMP 09/09/2017 (Exact Date)   BMI 24 59 kg/m²          Physical Exam  Vitals reviewed  Constitutional:       Appearance: She is well-developed  Neck:      Thyroid: No thyromegaly  Trachea: No tracheal deviation  Cardiovascular:      Rate and Rhythm: Normal rate and regular rhythm  Pulmonary:      Effort: Pulmonary effort is normal       Breath sounds: Normal breath sounds  Chest:   Breasts: Breasts are symmetrical       Right: No inverted nipple, mass, nipple discharge, skin change or tenderness  Left: No inverted nipple, mass, nipple discharge, skin change or tenderness  Abdominal:      General: There is no distension  Palpations: Abdomen is soft  There is no mass  Tenderness: There is no abdominal tenderness  Genitourinary:     Labia:         Right: No rash, tenderness, lesion or injury  Left: No rash, tenderness, lesion or injury  Vagina: Normal       Uterus: Absent  Adnexa:         Right: No mass, tenderness or fullness  Left: No mass, tenderness or fullness          Rectum: Normal       Comments:   Cervix is normal, uterus is surgically absent, no adnexal mass palpated

## 2022-10-06 NOTE — PROGRESS NOTES
AMB US Pelvic Non OB    Date/Time: 10/6/2022 7:40 AM  Performed by: Nancy Richter  Authorized by: Jaylin Leal DO   Universal Protocol:  Patient understanding: patient states understanding of the procedure being performed  Patient identity confirmed: verbally with patient      Procedure details:     Technique:  Transvaginal US, Non-OB    Position: lithotomy exam    Left ovary findings:     Left ovary:  Visualized    Length (cm): 2 67    Height (cm): 0 98    Width (cm): 2 09  Right ovary findings:     Right ovary:  Visualized    Length (cm): 5 64    Height (cm): 3 93    Width (cm): 5 07  Other findings:     Free pelvic fluid: not identified      Free peritoneal fluid: not identified    Post-Procedure Details:     Impression:  The uterus has been surgically removed  The right ovary appears within normal limits  The left ovary demonstrates a cyst with a few septations 4 5cm  It is unclear if this is the same cyst being followed  No free fluid  Tolerance: Tolerated well, no immediate complications    Complications: no complications    Additional Procedure Comments:      [employer] F8 E8C-RS transvaginal transducer Serial # [id number] was used to perform the examination today and subsequently followed with high level disinfection utilizing Trophon EPR procedure  Ultrasound performed at:     [id number]  [address] [place] [address], [facility] [address] [facility]  Phone:  [phone]  Fax:  [phone]

## 2022-10-11 PROBLEM — Z00.00 WELL ADULT EXAM: Status: RESOLVED | Noted: 2018-10-31 | Resolved: 2022-10-11

## 2022-10-11 PROBLEM — H61.22 CERUMEN DEBRIS ON TYMPANIC MEMBRANE OF LEFT EAR: Status: RESOLVED | Noted: 2022-05-24 | Resolved: 2022-10-11

## 2022-11-21 ENCOUNTER — ULTRASOUND (OUTPATIENT)
Dept: GYNECOLOGY | Facility: CLINIC | Age: 52
End: 2022-11-21

## 2022-11-21 DIAGNOSIS — N83.202 LEFT OVARIAN CYST: Primary | ICD-10-CM

## 2022-11-21 NOTE — PROGRESS NOTES
AMB US Pelvic Non OB    Date/Time: 11/21/2022 6:45 AM  Performed by: Riri Nayak  Authorized by: Lois Ochoa DO   Universal Protocol:  Consent given by: patient  Patient identity confirmed: verbally with patient      Procedure details:     Indications: ovarian cysts      Technique:  Transvaginal US, Non-OB    Position: lithotomy exam    Left ovary findings:     Left ovary:  Visualized    Length (cm): 2 22    Height (cm): 1 82    Width (cm): 2 13  Right ovary findings:     Right ovary:  Visualized    Length (cm): 2 72    Height (cm): 1 25    Width (cm): 1 47  Other findings:     Free pelvic fluid: not identified      Free peritoneal fluid: not identified    Post-Procedure Details:     Impression:  The uterus has been surgically removed  The right ovary appears within normal limits  The left ovary demonstrates a 1 5cm cyst containing some debris consistent with resolving cyst  Patient noted that about a week ago she had some LLQ pain x2 days  No free fluid noted  Tolerance: Tolerated well, no immediate complications    Complications: no complications    Additional Procedure Comments:      [employer] F8 E8C-RS transvaginal transducer Serial # [id number] was used to perform the examination today and subsequently followed with high level disinfection utilizing Trophon EPR procedure  Ultrasound performed at:     [id number]  [address] [facility], [facility] [address] [facility]  Phone:  [phone]  Fax:  [phone]

## 2022-11-29 ENCOUNTER — HOSPITAL ENCOUNTER (OUTPATIENT)
Dept: ULTRASOUND IMAGING | Facility: CLINIC | Age: 52
Discharge: HOME | End: 2022-11-29

## 2022-11-29 ENCOUNTER — HOSPITAL ENCOUNTER (OUTPATIENT)
Dept: MAMMOGRAPHY | Facility: CLINIC | Age: 52
Discharge: HOME | End: 2022-11-29

## 2022-11-29 VITALS — WEIGHT: 134 LBS | HEIGHT: 63 IN | BODY MASS INDEX: 23.74 KG/M2

## 2022-11-29 DIAGNOSIS — R92.8 ABNORMAL MAMMOGRAM: ICD-10-CM

## 2022-12-08 ENCOUNTER — TELEPHONE (OUTPATIENT)
Dept: SURGICAL ONCOLOGY | Facility: CLINIC | Age: 52
End: 2022-12-08

## 2022-12-08 NOTE — TELEPHONE ENCOUNTER
Per Dr Jolie Benitez request I called patient and explained need to reschedule follow up appointment for after he imaging  Patient was agreeable and rescheduled to 6/6/23 at 2:15pm in the [facility] office    ----- Message from Aydee Emery MD sent at 12/8/2022  3:05 PM EST -----  We should move her appt to follow her ultrasound

## 2023-02-13 NOTE — PROGRESS NOTES
AMB US Pelvic Non OB    Date/Time: 2/13/2023 3:35 PM  Performed by: Marcos Patterson  Authorized by: Carlitos White DO   Barry Protocol:  Patient identity confirmed: verbally with patient      Procedure details:     Indications: ovarian cysts      Technique:  Transvaginal US, Non-OB    Position: lithotomy exam    Left ovary findings:     Left ovary:  Visualized    Length (cm): 2 71    Height (cm): 1 63    Width (cm): 2 07  Right ovary findings:     Right ovary:  Visualized    Length (cm): 2 13    Height (cm): 1 35    Width (cm): 1 28  Other findings:     Free pelvic fluid: not identified      Free peritoneal fluid: not identified    Post-Procedure Details:     Impression:  The uterus has been surgically removed  The right ovary appears within normal limits  The left ovary again contains a 1 5cm stable cyst with debris  No free fluid  Tolerance: Tolerated well, no immediate complications    Complications: no complications    Additional Procedure Comments:      [employer] F8 E8C-RS transvaginal transducer Serial # [id number] was used to perform the examination today and subsequently followed with high level disinfection utilizing Trophon EPR procedure  Ultrasound performed at:     [address]  Phone:  [phone]  Fax:  [phone]

## 2023-02-14 ENCOUNTER — ULTRASOUND (OUTPATIENT)
Dept: GYNECOLOGY | Facility: CLINIC | Age: 53
End: 2023-02-14

## 2023-02-14 DIAGNOSIS — N83.202 LEFT OVARIAN CYST: Primary | ICD-10-CM

## 2023-03-03 DIAGNOSIS — Z00.00 ENCOUNTER FOR PREVENTIVE HEALTH EXAMINATION: ICD-10-CM

## 2023-03-08 DIAGNOSIS — Z00.00 ENCOUNTER FOR PREVENTIVE HEALTH EXAMINATION: ICD-10-CM

## 2023-05-22 ENCOUNTER — TELEPHONE (OUTPATIENT)
Dept: HEMATOLOGY ONCOLOGY | Facility: CLINIC | Age: 53
End: 2023-05-22

## 2023-05-22 ENCOUNTER — TELEPHONE (OUTPATIENT)
Dept: SURGICAL ONCOLOGY | Facility: CLINIC | Age: 53
End: 2023-05-22

## 2023-05-22 NOTE — TELEPHONE ENCOUNTER
Appointment Change  Cancel, Reschedule, Change to Virtual      Who are you speaking with? Patient   If it is not the patient, are they listed on an active communication consent form? N/A   Which provider is the appointment scheduled with? Dr Luly Roy   When is the appointment scheduled? Please list date and time 6/6/23 @ 2pm   At which location is the appointment scheduled to take place? [place]   Was the appointment rescheduled or changed from an in person visit to a virtual visit? If so, please list the details of the change  Yes 6/19/23 @ 10am with Dean Jackson   What is the reason for the appointment change? Dr Luly Roy with be in the OR   Was [facility] transport scheduled for this visit? no   Does [facility] transport need to be scheduled for the new visit (if applicable) no   Does the patient need an infusion appointment rescheduled? no   Does the patient have an infusion appointment scheduled? If so, when? na   Is the patient undergoing chemotherapy? no   Was the no-show policy reviewed for appointments being changed with less then 24 hours of notice?  yes

## 2023-05-22 NOTE — TELEPHONE ENCOUNTER
Called and left a message explaining need to reschedule her appointment with Dr Jennifer Castro on 6/6/23 due to Dr Jennifer Castro being in the [address] [place] [address]  Requested return call  Patient should be rescheduled with SEVERO for 30 minutes

## 2023-05-30 ENCOUNTER — HOSPITAL ENCOUNTER (OUTPATIENT)
Dept: ULTRASOUND IMAGING | Facility: CLINIC | Age: 53
Discharge: HOME | End: 2023-05-30

## 2023-05-30 VITALS — WEIGHT: 134 LBS | BODY MASS INDEX: 23.74 KG/M2 | HEIGHT: 63 IN

## 2023-05-30 DIAGNOSIS — R92.8 ABNORMAL FINDINGS ON DIAGNOSTIC IMAGING OF BREAST: ICD-10-CM

## 2023-06-01 ENCOUNTER — HOSPITAL ENCOUNTER (OUTPATIENT)
Dept: ULTRASOUND IMAGING | Facility: HOSPITAL | Age: 53
Discharge: HOME | End: 2023-06-01

## 2023-06-01 ENCOUNTER — APPOINTMENT (OUTPATIENT)
Dept: LAB | Facility: CLINIC | Age: 53
End: 2023-06-01

## 2023-06-01 ENCOUNTER — HOSPITAL ENCOUNTER (OUTPATIENT)
Dept: NON INVASIVE DIAGNOSTICS | Facility: CLINIC | Age: 53
Discharge: HOME | End: 2023-06-01

## 2023-06-01 ENCOUNTER — OFFICE VISIT (OUTPATIENT)
Dept: FAMILY MEDICINE CLINIC | Facility: CLINIC | Age: 53
End: 2023-06-01

## 2023-06-01 ENCOUNTER — HOSPITAL ENCOUNTER (OUTPATIENT)
Dept: VASCULAR ULTRASOUND | Facility: HOSPITAL | Age: 53
Discharge: HOME | End: 2023-06-01

## 2023-06-01 VITALS
RESPIRATION RATE: 14 BRPM | HEART RATE: 68 BPM | SYSTOLIC BLOOD PRESSURE: 102 MMHG | WEIGHT: 137.4 LBS | HEIGHT: 62 IN | BODY MASS INDEX: 25.28 KG/M2 | TEMPERATURE: 98.4 F | DIASTOLIC BLOOD PRESSURE: 64 MMHG | OXYGEN SATURATION: 99 %

## 2023-06-01 VITALS
HEART RATE: 68 BPM | DIASTOLIC BLOOD PRESSURE: 64 MMHG | SYSTOLIC BLOOD PRESSURE: 102 MMHG | BODY MASS INDEX: 25.27 KG/M2 | WEIGHT: 137.35 LBS | HEIGHT: 62 IN

## 2023-06-01 DIAGNOSIS — Z00.00 ENCOUNTER FOR PREVENTIVE HEALTH EXAMINATION: ICD-10-CM

## 2023-06-01 DIAGNOSIS — L40.9 PSORIASIS: ICD-10-CM

## 2023-06-01 DIAGNOSIS — Z00.00 WELL ADULT EXAM: Primary | ICD-10-CM

## 2023-06-01 PROBLEM — H90.71 MIXED CONDUCTIVE AND SENSORINEURAL HEARING LOSS OF RIGHT EAR: Status: RESOLVED | Noted: 2022-04-26 | Resolved: 2023-06-01

## 2023-06-01 PROBLEM — D25.9 LEIOMYOMA OF UTERUS: Status: RESOLVED | Noted: 2017-10-03 | Resolved: 2023-06-01

## 2023-06-01 LAB
25(OH)D3 SERPL-MCNC: 50.4 NG/ML (ref 30–100)
ALBUMIN SERPL BCP-MCNC: 4.6 G/DL (ref 3.5–5)
ALP SERPL-CCNC: 57 U/L (ref 34–104)
ALT SERPL W P-5'-P-CCNC: 21 U/L (ref 7–52)
ANION GAP SERPL CALCULATED.3IONS-SCNC: 4 MMOL/L (ref 4–13)
AST SERPL W P-5'-P-CCNC: 18 U/L (ref 13–39)
ATRIAL RATE: 69 BPM
BACTERIA UR QL AUTO: NORMAL /HPF
BASOPHILS # BLD AUTO: 0.04 THOUSANDS/ÂΜL (ref 0–0.1)
BASOPHILS NFR BLD AUTO: 1 % (ref 0–1)
BILIRUB SERPL-MCNC: 0.43 MG/DL (ref 0.2–1)
BILIRUB UR QL STRIP: NEGATIVE
BUN SERPL-MCNC: 19 MG/DL (ref 5–25)
CALCIUM SERPL-MCNC: 9.5 MG/DL (ref 8.4–10.2)
CHLORIDE SERPL-SCNC: 104 MMOL/L (ref 96–108)
CHOLEST SERPL-MCNC: 228 MG/DL
CLARITY UR: CLEAR
CO2 SERPL-SCNC: 31 MMOL/L (ref 21–32)
COLOR UR: COLORLESS
CREAT SERPL-MCNC: 0.69 MG/DL (ref 0.6–1.3)
CRP SERPL HS-MCNC: <0.9 MG/L
EOSINOPHIL # BLD AUTO: 0.04 THOUSAND/ÂΜL (ref 0–0.61)
EOSINOPHIL NFR BLD AUTO: 1 % (ref 0–6)
ERYTHROCYTE [DISTWIDTH] IN BLOOD BY AUTOMATED COUNT: 12.8 % (ref 11.6–15.1)
EST. AVERAGE GLUCOSE BLD GHB EST-MCNC: 108 MG/DL
GFR SERPL CREATININE-BSD FRML MDRD: 99 ML/MIN/1.73SQ M
GLUCOSE P FAST SERPL-MCNC: 104 MG/DL (ref 65–99)
GLUCOSE UR STRIP-MCNC: NEGATIVE MG/DL
HBA1C MFR BLD: 5.4 %
HCT VFR BLD AUTO: 42.5 % (ref 34.8–46.1)
HCV AB SER QL: NORMAL
HDLC SERPL-MCNC: 66 MG/DL
HGB BLD-MCNC: 14 G/DL (ref 11.5–15.4)
HGB UR QL STRIP.AUTO: NEGATIVE
IMM GRANULOCYTES # BLD AUTO: 0.02 THOUSAND/UL (ref 0–0.2)
IMM GRANULOCYTES NFR BLD AUTO: 0 % (ref 0–2)
KETONES UR STRIP-MCNC: NEGATIVE MG/DL
LDLC SERPL CALC-MCNC: 143 MG/DL (ref 0–100)
LEUKOCYTE ESTERASE UR QL STRIP: NEGATIVE
LYMPHOCYTES # BLD AUTO: 1.44 THOUSANDS/ÂΜL (ref 0.6–4.47)
LYMPHOCYTES NFR BLD AUTO: 28 % (ref 14–44)
MAX HR PERCENT: 97 %
MAX HR: 162 BPM
MCH RBC QN AUTO: 30.8 PG (ref 26.8–34.3)
MCHC RBC AUTO-ENTMCNC: 32.9 G/DL (ref 31.4–37.4)
MCV RBC AUTO: 94 FL (ref 82–98)
MONOCYTES # BLD AUTO: 0.48 THOUSAND/ÂΜL (ref 0.17–1.22)
MONOCYTES NFR BLD AUTO: 10 % (ref 4–12)
NEUTROPHILS # BLD AUTO: 3.05 THOUSANDS/ÂΜL (ref 1.85–7.62)
NEUTS SEG NFR BLD AUTO: 60 % (ref 43–75)
NITRITE UR QL STRIP: NEGATIVE
NON-SQ EPI CELLS URNS QL MICRO: NORMAL /HPF
NRBC BLD AUTO-RTO: 0 /100 WBCS
P AXIS: 61 DEGREES
PH UR STRIP.AUTO: 7 [PH]
PLATELET # BLD AUTO: 290 THOUSANDS/UL (ref 149–390)
PMV BLD AUTO: 9.9 FL (ref 8.9–12.7)
POTASSIUM SERPL-SCNC: 4.3 MMOL/L (ref 3.5–5.3)
PR INTERVAL: 152 MS
PROT SERPL-MCNC: 6.8 G/DL (ref 6.4–8.4)
PROT UR STRIP-MCNC: NEGATIVE MG/DL
QRS AXIS: 71 DEGREES
QRSD INTERVAL: 86 MS
QT INTERVAL: 410 MS
QTC INTERVAL: 439 MS
RATE PRESSURE PRODUCT: NORMAL
RBC # BLD AUTO: 4.54 MILLION/UL (ref 3.81–5.12)
RBC #/AREA URNS AUTO: NORMAL /HPF
SL CV LV EF: 55
SL CV STRESS RECOVERY BP: NORMAL MMHG
SL CV STRESS RECOVERY HR: 94 BPM
SL CV STRESS STAGE REACHED: 5
SODIUM SERPL-SCNC: 139 MMOL/L (ref 135–147)
SP GR UR STRIP.AUTO: 1.01 (ref 1–1.03)
STRESS ANGINA INDEX: 0
STRESS BASELINE BP: NORMAL MMHG
STRESS BASELINE HR: 67 BPM
STRESS O2 SAT REST: 98 %
STRESS PEAK HR: 162 BPM
STRESS POST ESTIMATED WORKLOAD: 15.7 METS
STRESS POST EXERCISE DUR MIN: 9 MIN
STRESS POST O2 SAT PEAK: 99 %
STRESS POST PEAK BP: 122 MMHG
T WAVE AXIS: 45 DEGREES
TRIGL SERPL-MCNC: 96 MG/DL
TSH SERPL DL<=0.05 MIU/L-ACNC: 1.44 UIU/ML (ref 0.45–4.5)
UROBILINOGEN UR STRIP-ACNC: <2 MG/DL
VENTRICULAR RATE: 69 BPM
WBC # BLD AUTO: 5.07 THOUSAND/UL (ref 4.31–10.16)
WBC #/AREA URNS AUTO: NORMAL /HPF

## 2023-06-01 PROCEDURE — 81001 URINALYSIS AUTO W/SCOPE: CPT

## 2023-06-01 PROCEDURE — 86141 C-REACTIVE PROTEIN HS: CPT

## 2023-06-01 PROCEDURE — 82306 VITAMIN D 25 HYDROXY: CPT

## 2023-06-01 PROCEDURE — 85025 COMPLETE CBC W/AUTO DIFF WBC: CPT

## 2023-06-01 PROCEDURE — 36415 COLL VENOUS BLD VENIPUNCTURE: CPT

## 2023-06-01 PROCEDURE — 84443 ASSAY THYROID STIM HORMONE: CPT

## 2023-06-01 PROCEDURE — 80061 LIPID PANEL: CPT

## 2023-06-01 PROCEDURE — 80053 COMPREHEN METABOLIC PANEL: CPT

## 2023-06-01 PROCEDURE — 83036 HEMOGLOBIN GLYCOSYLATED A1C: CPT

## 2023-06-01 PROCEDURE — 86803 HEPATITIS C AB TEST: CPT

## 2023-06-01 RX ORDER — PROGESTERONE 200 MG/1
1 CAPSULE ORAL DAILY
COMMUNITY
Start: 2021-10-15

## 2023-06-01 RX ORDER — CLOBETASOL PROPIONATE 0.46 MG/ML
SOLUTION TOPICAL
Qty: 50 ML | Refills: 3 | Status: SHIPPED | OUTPATIENT
Start: 2023-06-01

## 2023-06-01 NOTE — PROGRESS NOTES
[facility] Physical Exam     Annemarie Marx is a 45 y o  female who is presenting for an [facility] Physical Exam at [address]  Humberto Preciado is , Communications for [employer]  This is Ivonne's first [facility] physical   Humberto Preciado also underwent an [facility] physical on April 26, 2022  Her internist is Dr Farheen Coronado's goals for health are to establish a better work-life balance, contrast, control weight status post menopause, improve physical conditioning  Active medical problems include gastroesophageal reflux disease, allergic rhinitis, anxiety, psoriasis, migraines and mild hyperlipidemia  Humberto Preciado also has a family history of breast cancer anxiety followed by GYN and her breast oncology surgeon, Dr Dubon Brandon  Screening is up-to-date including ABUS  New issues: Humberto Preciado has longstanding intermittent GERD symptoms, somewhat improved by following a lactose-free gluten-free diet  Her symptoms are rarely heartburn but instead are difficulty swallowing at times  Her ENT physician recommended an allergy based paste along with probiotics  Humberto Preciado continues to experience some symptoms  EGD in 2019 showed mild reflux  Melvin Parra has had 3 episodes of postprandial late evening intense epigastric aching pain with nausea and vomiting  These episodes have occurred 4 to 6 hours after eating and last 1 to 2 hours  They have occurred after large or fatty meals  There is no radiation of symptoms  There is no diarrhea or fever  There has been no unexplained weight loss or loss of appetite  There is no previous history of gallbladder disease  Humberto Preciado did have a EGD and colonoscopy in 2019 by Dr Carmen Stuart of [facility]  Colonoscopy was normal     We discussed that these 2 entities should be reevaluated  GERD related symptoms and possible gallbladder disease were discussed        Past medical history:  History of ruptured ovarian cyst,   History of uterine fibroids  History of pneumonia,   History of right knee medial meniscal tear, bilateral elbow tendinitis and left shoulder bursitis    Family history: Mother: Breast cancer, hyperlipidemia  Father: Hyperlipidemia  Sister: Bipolar depression  There is a family history of breast cancer in both sides of the family  Past surgical history:    Breast reduction surgery,   Cystoscopy,   Right knee arthroscopy,   LASIK eye surgery,   Partial hysterectomy,   EGD and colonoscopy,     Social history:    No history of smoking  Alcohol intake is social   Exercise: 5-6 times per week  Coffee: 1 cup/day    Allergies: Percocet, sulfa antibiotics, wound dressing adhesives  Food intolerances: Lactose intolerance and gluten intolerance without diagnosis of celiac disease    Medications: See updated list on electronic record      Review of systems:    Difficulties with waking up at night after getting to sleep  Occasional Raynaud's symptoms              Active Ambulatory Problems     Diagnosis Date Noted   • Family history of breast cancer in female    • Psoriasis 2022   • Allergic rhinitis 2022   • Mixed hyperlipidemia 2022   • Gastroesophageal reflux disease without esophagitis 2022     Resolved Ambulatory Problems     Diagnosis Date Noted   • Leiomyoma of uterus 10/03/2017   • Well adult exam 10/31/2018   • Mixed conductive and sensorineural hearing loss of right ear 2022   • Cerumen debris on tympanic membrane of left ear 2022     Past Medical History:   Diagnosis Date   • Abdominal pain    • Abnormal weight gain    • Anxiety    • Bloating    • Bloating    • Endometriosis    • Enlarged lymph node    • Female infertility    • Fibroid uterus    • Gluten intolerance    •  1 para 1    • Headache    • Heartburn    • History of early menarche    • History of ovarian cyst    • Hx of migraines    • Inconclusive mammogram due to dense breasts    • Increased glucose level    • Increased risk of breast cancer    • Interstitial cystitis    • Intolerance to milk products    • Intolerance to milk products    • Irregular menstrual cycle    • Lactose intolerance    • Leiomyoma of uterus, unspecified    • Lichen sclerosus et atrophicus    • Lichen sclerosus et atrophicus    • Lichen simplex chronicus    • Lichen simplex chronicus    • Lichen simplex chronicus    • Migraine    • Migraine    • Mild heartburn    • Ovarian cyst    • Pelvic pain in female    • PONV (postoperative nausea and vomiting)    • RLQ abdominal pain    • Simple ovarian cyst    • Urinary tract infection    • Wears glasses    • Yeast infection        Past Surgical History:   Procedure Laterality Date   • BREAST BIOPSY Left 11/20/2009    OPEN   • BREAST BIOPSY  01/06/2009    NEEDLE CORE   • BREAST SURGERY Bilateral     Breast reduction   • BUNIONECTOMY Right     5th toe right foot   • CYSTOSCOPY N/A 10/03/2017    Procedure: Clinton Zuñiga;  Surgeon: Aundrea Reich DO;  Location: [facility];  Service: Gynecology   • HYSTERECTOMY      supracervical   • KNEE ARTHROSCOPY Right     Right outer meniscus   • KNEE SURGERY Right     last assessed 12/15/14   • LASIK     • LASIK     • LIPOMA RESECTION      x2 on back   • NC LAPS SUPRACRV HYSTERECT 250 GM/< RMVL TUBE/OVAR N/A 10/03/2017    Procedure: HYSTERECTOMY LAPAROSCOPIC SUPRACERVICAL [facility]);   Surgeon: Aundrea Reich DO;  Location: [facility];  Service: Gynecology   • REDUCTION MAMMAPLASTY Bilateral    • SALPINGECTOMY Bilateral 10/03/2017    Procedure: SALPINGECTOMY;  Surgeon: Aundrea Reich DO;  Location: [facility];  Service: Gynecology   • SALPINGECTOMY Left     Unilateral , last assessed 10/19/17   • WISDOM TOOTH EXTRACTION         Family History   Problem Relation Age of Onset   • Breast cancer Mother 79   • Other Mother         Hypoglycemia   • Hyperlipidemia Father    • Depression Sister    • Lung cancer Maternal Grandmother "80   • Breast cancer Maternal Grandmother 75   • Prostate cancer Maternal Grandfather 60   • Breast cancer Paternal Grandmother 54   • No Known Problems Paternal Grandfather    • Prostate cancer Maternal Uncle 54       Social History     Tobacco Use   Smoking Status Never   Smokeless Tobacco Never         Current Outpatient Medications:   •  Progesterone 200 MG CAPS, Take 1 tablet by mouth in the morning, Disp: , Rfl:   •  Acidophilus Lactobacillus CAPS, Take 1 capsule by mouth daily, Disp: , Rfl:   •  ALPRAZolam (XANAX) 0 25 mg tablet, Xanax, Disp: , Rfl:   •  Ascorbic Acid (VITAMIN C) 1000 MG tablet, Take 1,000 mg by mouth daily, Disp: , Rfl:   •  butalbital-acetaminophen-caffeine (FIORICET,ESGIC) -40 mg per tablet, butalbital-acetaminophen-caffeine 50 mg-325 mg-40 mg tablet, Disp: , Rfl:   •  calcium carbonate (OS-FREDI) 600 MG tablet, Take 600 mg by mouth in the morning  With magnesium   , Disp: , Rfl:   •  clobetasol (TEMOVATE) 0 05 % ointment, Apply once a week, Disp: 30 g, Rfl: 1  •  co-enzyme Q-10 30 MG capsule, Take 30 mg by mouth 3 (three) times a day, Disp: , Rfl:   •  Melatonin 1 MG CAPS, Take 3 mg by mouth, Disp: , Rfl:   •  Multiple Vitamin (MULTIVITAMIN) capsule, Take 1 capsule by mouth daily, Disp: , Rfl:   •  Multiple Vitamins-Minerals (MULTIVITAMIN ADULT EXTRA C PO), multivitamin, Disp: , Rfl:     Allergies   Allergen Reactions   • Nickel Hives and Rash     From plated jewelry or any sort   • Percocet [Oxycodone-Acetaminophen] Other (See Comments)     Jittery and teeth chatter   • Sulfa Antibiotics GI Intolerance and Hives     Severe nausea  \"Nausea \T\ constipation\"   • Wound Dressing Adhesive Rash   • Gluten Meal - Food Allergy GI Intolerance     Negative for celiac disease   • Lactose - Food Allergy GI Intolerance   • Latex      Patient unsure if it was surgical glue or steristrips that were used on knee after surgery that redness and itching   States her surgeon told her to tell people she " "has a Latex allergy   • Septra [Sulfamethoxazole-Trimethoprim]          Objective:         Physical Exam      BP:102/64  P: 68   RR: 14  T: 98 4  O2 Sat: 99%    Ht: 5'2\"    Wt: 137 4 lbs    VA: OD: 20/25    OS: 20/40 with glasses    Pleasant, alert and oriented and in no acute distress  Affect: normal  HEENT:  NCAT  No temporal artery or temporalis artery tenderness  No adenopathy  Scalp normal  Eyes:  Pupils 3 mm with normal direct and consensual light responses, extraocular eye movements intact  There is no conjunctival pallor or scleral icterus  Funduscopic: Normal anterior chambers, no iritis, no opacities, normal vasculature, no hemorrhages or exudates, normal optic discs  Ears:  No deformity of external ears  No pain on manipulation of the tragus  No mastoid redness, swelling, tenderness or drainage  External auditory canals: No abnormalities  Tympanic membranes:  No trauma, no retraction or rupture, no bulging or erythema, no air-fluid level, no cholesteatoma  Nose:  No trauma or deformity  The canals are patent with normal mucous membranes, no polyps or masses  There is no nasal discharge  There is no septal deviation  Oral cavity and throat:  Normal dentition with no evidence of gum disease  mucous membranes are normal  The airway is patent  There is no swelling or other abnormality of the uvula  Salivary glands:  Normal on inspection and palpation  There are no hemorrhages or exudates of the throat  Tonsils are involuted  There is no submandibular adenopathy  There is no submental adenopathy  Neck:  Supple, full range of motion, nontender, no deformity or trauma  There is no cervical adenopathy  Carotid arteries:  Normal upstroke and descent without bruit  There is no JVD  Trachea is midline without stridor  Thyroid:  Normal on inspection and palpation  Chest:  No trauma or deformity, no supraclavicular adenopathy, no subclavian bruits  Surgical scars: None    Chest wall expansion " is symmetric normal   Lungs: There is no tachypnea or dyspnea, no use of accessory muscles of respiration  Breath sounds are normal and equal throughout, expiratory phase of respiration is not prolonged  Exam includes no wheezing or rales, no focal decreased breath sounds  Heart:  Regular rate and rhythm, normal S1 and S2, no S4 or S3  Murmurs:  None  No cardiac rub  Back:  No trauma, no kyphosis or scoliosis, no CVA mass, no spinal tenderness  Abdomen:  Nondistended, normal bowel sounds, soft, nontender without masses or organomegaly  There is no pulsatile mass or bruit  Hernias: none    Hypogastric:  No distention, no tympany or tenderness over the bladder, no mass  Arterial circulation:  +2/2 brachial, radial and ulnar pulses bilaterally  +2/2  popliteal, posterior tibial and dorsalis pedis pulses bilaterally  Extremities:  No clubbing or cyanosis are present  There is no upper extremity edema  There is no lower extremity edema  there are no varicosities   bilaterally  No phlebitic findings or calf tenderness  Joints: No synovitis, normal joint function, no tophi    Skin: see Dermatologist's note    Neurologic:  Cranial nerves 2-12 are grossly intact, there is no dysmetria, there is no neurologic gait abnormality  There is no spasticity  There is no tremor  There is no masked faces, no bradykinesia or rigidity  Sensory deficit: None  Balance: normal  Strength symmetric normal 5/5 throughout  Reflexes:  +1 biceps, patellar and Achilles, Babinski response downgoing bilaterally  Cognitive exam:  orientation as above  Executive functions:  intact  Short-term memory:  intact  Long-term memory:  intact  Language: fluent  Assessment/Plan:     1  Well adult exam         [facility] Summary:      Braden Leary,  It was nice to meet you today! Your lifestyle is very healthy overall and your overall health/family history are favorable      Regarding your reflux symptoms and you recent onset of aching abdominal pain with vomiting, I recommend that you return to see Dr Latha Dickey,    Dr Lyssa Ordoñez

## 2023-06-01 NOTE — PROGRESS NOTES
Fitness Summary and Recommendations:  Ivonne scored at the 45% on her body composition assessment with a bodyfat % of 30 7%  Ivonne scored in the above average range for flexibility with a sit & reach score of 32 cm  Her Muscle Strength/Endurance scores placed her at the 95% (lower body) and 95% (upper body) with a chair stand score of 29 and arm curl score of 30 respectively  Ivonne’s Cardiovascular Score of 52 9 placed her at the 95%  Overall, Raymundo Sims would be considered to have an excellent fitness level with an 80% score  Her overall fitness score has increased by 1% from her previous test on 04/26/22  Continued emphasis on regular exercise and sound nutrition practices will enable Raymundo Sims to reach her optimal level of fitness

## 2023-06-01 NOTE — PROGRESS NOTES
Nutritional Summary and Recommendations:     Patient Nutrition-Oriented Medical/Diet Hx  Sasha Ortiz presents today to [facility] [employer] [facility] [employer] for nutrition re-assessment and counseling  Since last visit, Sasha Ortiz reports following a lactose free, gluten free diet to reduce GERD symptoms, reports feeling some overall improvements  Goals/results reviewed from last visit  Sasha Ortiz has seen improvements in A1C and cholesterol labs  Body composition remains stable  Discussion focused on eating adequate fiber and water intake  Labs reviewed  Nutrition Related Medications/Supplements:  Vitamin C  Vitamin B complex  CoQ10  MVI  Vitamin D     Labs:  A1C 5 4  Total Cholesterol 228  Triglycerides 96  HDL 66    Vitamin D 50 4       Anthropometrics:     Ht: 5 ft 3 in  Wt: 135 4 lb   BMI: 24 0     BMR: 1288 kcals  Fat%: 30 7%  Acceptable Range: 23-34%     Fat Mass: 41 6 lb  FFM: 93 8 lb  TBW: 68 6 lb     Nutrition Diagnosis:  Altered nutrition related laboratory values  r/t physiological events as evidenced by LDL cholesterol of 143 mg/dL     Nutrition Recommendations:    1  Maintain body composition by next visit  2  Aim to eat 25-30 grams of fiber a day, refer to fiber chart in binder for fiber content of fruits and vegetables  3  Drink at least 64 fluid ounces of water daily  4   Contact RD with any questions or concerns    Nutrition Education    Fiber and fluid intake       Perceived Comprehension:  Good     Expected Compliance: Good

## 2023-06-01 NOTE — PROGRESS NOTES
"  Your [facility]'s Board-Certified Dermatologist's Name: Jonna López MD    Skin Screening Exam:    A chaperone was present throughout the entire encounter  SKIN:  FULL ORGAN SYSTEM EXAM   Hair, Scalp, Ears, Face Normal except as noted below in Assessment   Neck, Cervical Chain Nodes Normal except as noted below in Assessment   Right Arm/Hand/Fingers Normal except as noted below in Assessment   Left Arm/Hand/Fingers Normal except as noted below in Assessment   Chest/Breasts/Axillae • Did the patient specifically refuse to have the areas \"under-the-bra\" examined by the Dermatologist? No  Examined areas normal except as noted below in Assessment   Abdomen, Umbilicus Normal except as noted below in Assessment   Back/Spine Normal except as noted below in Assessment   Groin/Genitalia/Buttocks • Did the patient specifically refuse to have the areas \"under-the-underwear\" examined by the Dermatologist? No  Examined areas normal except as noted below in Assessment   Right Leg, Foot, Toes Normal except as noted below in Assessment   Left Leg, Foot, Toes Normal except as noted below in Assessment        Assessment and Plan by Diagnosis:    • Use a moisturizer + sunscreen \"combo\" product such as Neutrogena Daily Defense SPF 50+ or CeraVe AM at least three times a day  • Follow-up with your private dermatologist or one of our board-certified [place] [id number] Dermatologists as discussed  • [facility]'s own Dr Carmencita Ruiz is available for consultation for skin enhancement and revitalization services; please mention \"[facility]\" for expedited scheduling      MELANOCYTIC NEVI (\"Moles\")    Physical Exam:  • Anatomic Location Affected:   Mostly on sun-exposed areas of the trunk and extremities  • Morphological Description:  Scattered, 1-4mm round to ovoid, symmetric and evenly bordered, regularly pigmented macules/papules without outliers other than if noted elsewhere in today's note    • Pertinent " "Positives:  • Pertinent Negatives: Additional History of Present Condition:      Assessment and Plan:  Based on a thorough discussion of this condition and the management approach to it (including a comprehensive discussion of the known risks, side effects and potential benefits of treatment), the patient (family) agrees to implement the following specific plan:  • The patient was encouraged to use an SPF30+ broad spectrum sunscreen daily and re-apply every 2-3 outdoors while outside  The importance of sun protection, self-skin exams, and sun avoidance was emphasized  An annual full body skin exam is recommended, and the patient was encouraged to return to the office sooner for any new or changing lesions of concerns  • Benign, reassured  • Annual skin check     Melanocytic Nevi  Melanocytic nevi (\"moles\") are tan or brown, raised or flat areas of the skin which have an increased number of melanocytes  Melanocytes are the cells in our body which make pigment and account for skin color  Some moles are present at birth (I e , \"congenital nevi\"), while others come up later in life (i e , \"acquired nevi\")  The sun can stimulate the body to make more moles  Sunburns are not the only thing that triggers more moles  Chronic sun exposure can do it too  Clinically distinguishing a healthy mole from melanoma may be difficult, even for experienced dermatologists  The \"ABCDE's\" of moles have been suggested as a means of helping to alert a person to a suspicious mole and the possible increased risk of melanoma  The suggestions for raising alert are as follows:    Asymmetry: Healthy moles tend to be symmetric, while melanomas are often asymmetric  Asymmetry means if you draw a line through the mole, the two halves do not match in color, size, shape, or surface texture   Asymmetry can be a result of rapid enlargement of a mole, the development of a raised area on a previously flat lesion, scaling, ulceration, " "bleeding or scabbing within the mole  Any mole that starts to demonstrate \"asymmetry\" should be examined promptly by a board certified dermatologist      Border: Healthy moles tend to have discrete, even borders  The border of a melanoma often blends into the normal skin and does not sharply delineate the mole from normal skin  Any mole that starts to demonstrate \"uneven borders\" should be examined promptly by a board certified dermatologist      Color: Healthy moles tend to be one color throughout  Melanomas tend to be made up of different colors ranging from dark black, blue, white, or red  Any mole that demonstrates a color change should be examined promptly by a board certified dermatologist      Diameter: Healthy moles tend to be smaller than 0 6 cm in size; an exception are \"congenital nevi\" that can be larger  Melanomas tend to grow and can often be greater than 0 6 cm (1/4 of an inch, or the size of a pencil eraser)  This is only a guideline, and many normal moles may be larger than 0 6 cm without being unhealthy  Any mole that starts to change in size (small to bigger or bigger to smaller) should be examined promptly by a board certified dermatologist      Evolving: Healthy moles tend to \"stay the same  \"  Melanomas may often show signs of change or evolution such as a change in size, shape, color, or elevation  Any mole that starts to itch, bleed, crust, burn, hurt, or ulcerate or demonstrate a change or evolution should be examined promptly by a board certified dermatologist         LENTIGO    Physical Exam:  • Anatomic Location Affected:  Legs, sun exposed skin  • Morphological Description:  Light brown well demarcated macules on sun exposed skin with reassuring dermoscopy  • Pertinent Positives:  • Pertinent Negatives:     Additional History of Present Condition:      Assessment and Plan:  Based on a thorough discussion of this condition and the management approach to it (including a comprehensive " discussion of the known risks, side effects and potential benefits of treatment), the patient (family) agrees to implement the following specific plan:  • When outside we recommend using a wide brim hat, sunglasses, long sleeve and pants, sunscreen with SPF 49+ with reapplication every 2 hours, or SPF specific clothing       What is a lentigo? A lentigo is a pigmented flat or slightly raised lesion with a clearly defined edge  Unlike an ephelis (freckle), it does not fade in the winter months  There are several kinds of lentigo  The name lentigo originally referred to its appearance resembling a small lentil  The plural of lentigo is lentigines, although “lentigos” is also in common use  Who gets lentigines? Lentigines can affect males and females of all ages and races  Solar lentigines are especially prevalent in fair skinned adults  Lentigines associated with syndromes are present at birth or arise during childhood  What causes lentigines? Common forms of lentigo are due to exposure to ultraviolet radiation:  • Sun damage including sunburn   • Indoor tanning   • Phototherapy, especially photochemotherapy (PUVA)    Ionizing radiation, eg radiation therapy, can also cause lentigines  Several familial syndromes associated with widespread lentigines originate from mutations in Rafael-MAP kinase, mTOR signaling and PTEN pathways  What is the treatment for lentigines? Most lentigines are left alone  Attempts to lighten them may not be successful  The following approaches are used:  • SPF 50+ broad-spectrum sunscreen   • Hydroquinone bleaching cream   • Alpha hydroxy acids   • Vitamin C   • Retinoids   • Azelaic acid   • Chemical peels  Individual lesions can be permanently removed using:  • Cryotherapy   • Intense pulsed light   • Pigment lasers    How can lentigines be prevented? Lentigines associated with exposure ultraviolet radiation can be prevented by very careful sun protection   Clothing is more "successful at preventing new lentigines than are sunscreens  What is the outlook for lentigines? Lentigines usually persist  They may increase in number with age and sun exposure  Some in sun-protected sites may fade and disappear  ALSTON ANGIOMAS    Physical Exam:  • Anatomic Location Affected:  trunk  • Morphological Description:  Scattered cherry red, variably sized papules  • Pertinent Positives:  • Pertinent Negatives: Additional History of Present Condition:      Assessment and Plan:  Based on a thorough discussion of this condition and the management approach to it (including a comprehensive discussion of the known risks, side effects and potential benefits of treatment), the patient (family) agrees to implement the following specific plan:  • Monitor for changes  • Benign, reassured  •     Assessment and Plan:    Cherry angioma, also known as Ed Abdullahi spots, are benign vascular skin lesions  A \"cherry angioma\" is a firm red, blue or purple papule, 0 1-1 cm in diameter  When thrombosed, they can appear black in colour until evaluated with a dermatoscope when the red or purple colour is more easily seen  Cherry angioma may develop on any part of the body but most often appear on the scalp, face, lips and trunk  An angioma is due to proliferating endothelial cells; these are the cells that line the inside of a blood vessel  Angiomas can arise in early life or later in life; the most common type of angioma is a cherry angioma  Cherry angiomas are very common in males and females of any age or race  They are more noticeable in white skin than in skin of colour  They markedly increase in number from about the age of 36  There may be a family history of similar lesions  Eruptive cherry angiomas have been rarely reported to be associated with internal malignancy  The cause of angiomas is unknown   Genetic analysis of cherry angiomas has shown that they frequently carry specific somatic " "missense mutations in the GNAQ and GNA11 (Q209H) genes, which are involved in other vascular and melanocytic proliferations  SEBORRHEIC KERATOSIS; NON-INFLAMED    Physical Exam:  • Anatomic Location Affected:  Trunk, left back at site of patient concern  • Morphological Description:  Brown waxy variably sized \"stuck-on\" appearing papules with reassuring dermoscopy  • Pertinent Positives:  • Pertinent Negatives: Additional History of Present Condition:  None    Assessment and Plan:  Based on a thorough discussion of this condition and the management approach to it (including a comprehensive discussion of the known risks, side effects and potential benefits of treatment), the patient (family) agrees to implement the following specific plan:  • Monitor for changes  • Benign, reassured  •     Seborrheic Keratosis  A seborrheic keratosis is a harmless warty spot that appears during adult life as a common sign of skin aging  Seborrheic keratoses can arise on any area of skin, covered or uncovered, with the usual exception of the palms and soles  They do not arise from mucous membranes  Seborrheic keratoses can have highly variable appearance  Seborrheic keratoses are extremely common  It has been estimated that over 90% of adults over the age of 61 years have one or more of them  They occur in males and females of all races, typically beginning to erupt in the 35s or 45s  They are uncommon under the age of 21 years  The precise cause of seborrhoeic keratoses is not known  Seborrhoeic keratoses are considered degenerative in nature  As time goes by, seborrheic keratoses tend to become more numerous  Some people inherit a tendency to develop a very large number of them; some people may have hundreds of them  There is no easy way to remove multiple lesions on a single occasion    Unless a specific lesion is \"inflamed\" and is causing pain or stinging/burning or is bleeding, most insurance companies do not " authorize treatment  PSORIASIS    Physical Exam:  • Anatomic Location Affected:  scalp  • Morphological Description: Thick erythematous scaling plaques  • Severity: moderate  • Body Percent Affected: 5%  • Pertinent Positives:  • Pertinent Negatives: Additional History of Present Condition:  Long history of psoriasis only using otc sal acid products at this time  Assessment and Plan:  Based on a thorough discussion of this condition and the management approach to it (including a comprehensive discussion of the known risks, side effects and potential benefits of treatment), the patient (family) agrees to implement the following specific plan:  • Start clobetasol BID for 2-3 weeks prn flares  • If this fails, she will contact clinic to consider escalating therapy     Psoriasis is a chronic inflammatory condition that causes the body to make new skin cells in days rather than weeks  As these cells pile up on the surface of the skin, you may see thick, scaly patches of thickened skin  Psoriasis affects 2-4% of males and females  It can start at any age including childhood, with peaks of onset at 15-25 years and 50-60 years  It tends to persist lifelong, fluctuating in extent and severity  It is particularly common in Caucasians but may affect people of any race  About one-third of patients with psoriasis have family members with psoriasis  Psoriasis is multifactorial  It is classified as an immune-mediated inflammatory disease (IMID)  Genetic factors are important and influence the type of psoriasis and response to treatment  What are the signs and symptoms of psoriasis? There are many different types of psoriasis that each have present uniquely  The types of psoriasis include:    Plaque psoriasis: About 80% to 90% of people who have psoriasis develop this type   When plaque psoriasis appears, you may see:  Plaque psoriasis usually presents with symmetrically distributed, red, scaly plaques with well-defined edges  The scale is typically silvery white, except in skin folds where the plaques often appear shiny and they may have a moist peeling surface  The most common sites are scalp, elbows and knees, but any part of the skin can be involved  The plaques are usually very persistent without treatment  Itch is mostly mild but may be severe in some patients, leading to scratching and lichenification (thickened leathery skin with increased skin markings)  Painful skin cracks or fissures may occur  When psoriatic plaques clear up, they may leave brown or pale marks that can be expected to fade over several months  Guttate psoriasis: When someone gets this type of psoriasis, you often see tiny bumps appear on the skin quite suddenly  The bumps tend to cover much of the torso, legs, and arms  Sometimes, the bumps also develop on the face, scalp, and ears  No matter where they appear, the bumps tend to be:   • Small and scaly  • [place]-colored to pink  • Temporary, clearing in a few weeks or months without treatment  When guttate psoriasis clears, it may never return  Why this happens is still a bit of a mystery  Guttate psoriasis tends to develop in children and young adults who've had an infection, such as strep throat  It's possible that when the infection clears so does guttate psoriasis  It's also possible to have:  • Guttate psoriasis for life  • See the guttate psoriasis clear and plaque psoriasis develop later in life  • Plaque psoriasis when you develop guttate psoriasis  There's no way to predict what will happen after the first flare-up of guttate psoriasis clears  Inverse psoriasis: This type of psoriasis develops in areas where skin touches skin, such as the armpits, genitals, and crease of the buttocks   Where the inverse psoriasis appears, you're likely to notice:  • Smooth, red patches of skin that look raw  • Little, if any, silvery-white coating  • Sore or painful skin  Other names for this type of psoriasis are intertriginous psoriasis or flexural psoriasis  Pustular psoriasis: This type of psoriasis causes pus-filled bumps that usually appear only on the feet and hands  While the pus-filled bumps may look like an infection, the skin is not infected  The bumps don't contain bacteria or anything else that could cause an infection  Where pustular psoriasis appears, you tend to notice:  • Red, swollen skin that is dotted with pus-filled bumps  • Extremely sore or painful skin  • Brown dots (and sometimes scale) appear as the pus-filled bumps dry  Pustular psoriasis can make just about any activity that requires your hands or feet, such as typing or walking, unbearably painful  Pustular psoriasis (generalized): Serious and life-threatening, this rare type of psoriasis causes pus-filled bumps to develop on much of the skin  Also called von Zumbusch psoriasis, a flare-up causes this sequence of events:  1  Skin on most of the body suddenly turns dry, red, and tender  2  Within hours, pus-filled bumps cover most of the skin  3  Often within a day, the pus-filled bumps break open and pools of pus leak onto the skin  4  As the pus dries (usually within 24 to 48 hours), the skin dries out and peels (as shown in this picture)  5  When the dried skin peels off, you see a smooth, glazed surface  6  In a few days or weeks, you may see a new crop of pus-filled bumps covering most of the skin, as the cycle repeats itself  Anyone with pustular psoriasis also feels very sick, and may develop a fever, headache, muscle weakness, and other symptoms  Medical care is often necessary to save the person's life  Erythrodermic psoriasis: Serious and life-threatening, this type of psoriasis requires immediate medical care   When someone develops erythrodermic psoriasis, you may notice:  • Skin on most of the body looks burnt  • Chills, fever, and the person looks extremely ill  • Muscle weakness, a rapid pulse, and severe itch  The person may also be unable to keep warm, so hypothermia can set in quickly  Most people who develop this type of psoriasis already have another type of psoriasis  Before developing erythrodermic psoriasis, they often notice that their psoriasis is worsening or not improving with treatment  If you notice either of these happening, see a board-certified dermatologist     Nails    Nail psoriasis: With any type of psoriasis, you may see changes to your fingernails or toenails  About half of the people who have plaque psoriasis see signs of psoriasis on their fingernails at some point2  When psoriasis affects the nails, you may notice:  • Tiny dents in your nails (called “nail pits”)  • White, yellow, or brown discoloration under one or more nails  • Crumbling, rough nails  • A nail lifting up so that it's no longer attached  • Buildup of skin cells beneath one or more nails, which lifts up the nail  Treatment and proper nail care can help you control nail psoriasis  Psoriatic arthritis: If you have psoriasis, it's important to pay attention to your joints  Some people who have psoriasis develop a type of arthritis called psoriatic arthritis  This is more likely to occur if you have severe psoriasis  Most people notice psoriasis on their skin years before they develop psoriatic arthritis  It's also possible to get psoriatic arthritis before psoriasis, but this is less common  When psoriatic arthritis develops, the signs can be subtle  At first, you may notice:  • A swollen and tender joint, especially in a finger or toe  • Heel pain  • Swelling on the back of your leg, just above your heel  • Stiffness in the morning that fades during the day  Like psoriasis, psoriatic arthritis cannot be cured  Treatment can prevent psoriatic arthritis from worsening, which is important  Allowed to progress, psoriatic arthritis can become disabling      Diagnosis and treatment of psoriasis   Psoriasis is usually diagnosed by clinical features, and skin biopsy if necessary  It is important to decrease factors that aggravate psoriasis  These include treating streptococcal infections, minimizing skin injuries, avoiding sun exposure if it exacerbates psoriasis, smoking, alcohol usage, decreasing stress, and maintaining an optimal body weight  Certain medications such as lithium, beta blockers, antimalarials, and NSAIDs have also been implicated  Suddenly stopping oral steroids or strong topical steroids can cause rebound disease  There are many categories of psoriasis treatments available  Topical therapy  Mild psoriasis is generally treated with topical agents alone  Which treatment is selected may depend on body site, extent and severity of psoriasis  • Emollients  • Coal tar preparations  • Dithranol  • Salicylic acid  • Vitamin D analogue (calcipotriol)  • Topical corticosteroids  • Calcineurin inhibitor (tacrolimus, pimecrolimus)  Phototherapy  Most psoriasis centres offer phototherapy with ultraviolet (UV) radiation, often in combination with topical or systemic agents  Types of phototherapy include  • Narrowband UVB  • Broadband UVB  • Photochemotherapy (PUVA)  • Targeted phototherapy  Systemic therapy  Moderate to severe psoriasis warrants treatment with a systemic agent and/or phototherapy  The most common treatments are:  • Methotrexate  • Ciclosporin  • Acitretin  Other medicines occasionally used for psoriasis include:  • Mycophenolate  • Apremilast  • Hydroxyurea  • Azathioprine  • 6-mercaptopurine  Systemic corticosteroids are best avoided due to a risk of severe withdrawal flare of psoriasis and adverse effects  Biologics or targeted therapies are reserved for conventional treatment-resistant severe psoriasis, mainly because of expense, as side effects compare favorably with other systemic agents   These include:  • Anti-tumour necrosis factor-alpha antagonists (anti-TNF?) infliximab, adalimumab and etanercept  • The interleukin (IL)-12/23 antagonist ustekinumab  • IL-17 antagonists such as secukinumab  Many other monoclonal antibodies are under investigation in the treatment of psoriasis

## 2023-06-01 NOTE — PROGRESS NOTES
HEART AND VASCULAR SUMMARY    1  Lipids: Total 228, TG 96, HDL 66,     2  ECG: Normal Sinus Rhythm    3  Echocardiogram: Normal Study    4  Stress ECHO: Normal Study    5  Coronary Calcium CT: 3 ( 2022)    6  The 10-year ASCVD risk score (Maribeth ALVAREZ, et al , 2019) is: 1 1%    Values used to calculate the score:      Age: 45 years      Sex: Female      Is Non- : No      Diabetic: No      Tobacco smoker: No      Systolic Blood Pressure: 459 mmHg      Is BP treated: No      HDL Cholesterol: 66 mg/dL      Total Cholesterol: 228 mg/dL     7  HSCRP:   Lab Results   Component Value Date    HSCRP <0 90 06/01/2023       8  Vitals:   Vitals:    06/01/23 0952   BP: 102/64   Pulse: 68   Resp: 14   Temp: 98 4 °F (36 9 °C)   SpO2: 99%       Impression and Recommendations:    1  Normal cardiovascular exam   2  Continue heart healthy lifestyle  3  Continue to work on lowering your cholesterol

## 2023-06-02 LAB
AORTIC ROOT: 2.9 CM
APICAL FOUR CHAMBER EJECTION FRACTION: 68 %
ASCENDING AORTA: 3 CM
E WAVE DECELERATION TIME: 231 MS
FRACTIONAL SHORTENING: 34 (ref 28–44)
INTERVENTRICULAR SEPTUM IN DIASTOLE (PARASTERNAL SHORT AXIS VIEW): 0.8 CM
INTERVENTRICULAR SEPTUM: 0.8 CM (ref 0.6–1.1)
LAAS-AP2: 15 CM2
LAAS-AP4: 14.6 CM2
LEFT ATRIUM AREA SYSTOLE SINGLE PLANE A4C: 14.6 CM2
LEFT ATRIUM SIZE: 3.4 CM
LEFT INTERNAL DIMENSION IN SYSTOLE: 2.9 CM (ref 2.1–4)
LEFT VENTRICULAR INTERNAL DIMENSION IN DIASTOLE: 4.4 CM (ref 3.5–6)
LEFT VENTRICULAR POSTERIOR WALL IN END DIASTOLE: 0.8 CM
LEFT VENTRICULAR STROKE VOLUME: 59 ML
LVSV (TEICH): 59 ML
MV E'TISSUE VEL-SEP: 13 CM/S
MV PEAK A VEL: 0.53 M/S
MV PEAK E VEL: 85 CM/S
MV STENOSIS PRESSURE HALF TIME: 67 MS
MV VALVE AREA P 1/2 METHOD: 3.28
RIGHT ATRIUM AREA SYSTOLE A4C: 11.5 CM2
RIGHT VENTRICLE ID DIMENSION: 3.2 CM
SL CV LEFT ATRIUM LENGTH A2C: 4.7 CM
SL CV LV EF: 55
SL CV PED ECHO LEFT VENTRICLE DIASTOLIC VOLUME (MOD BIPLANE) 2D: 90 ML
SL CV PED ECHO LEFT VENTRICLE SYSTOLIC VOLUME (MOD BIPLANE) 2D: 31 ML
TR MAX PG: 17 MMHG
TR PEAK VELOCITY: 2.1 M/S
TRICUSPID ANNULAR PLANE SYSTOLIC EXCURSION: 2.2 CM
TRICUSPID VALVE PEAK REGURGITATION VELOCITY: 2.06 M/S

## 2023-06-19 ENCOUNTER — OFFICE VISIT (OUTPATIENT)
Dept: SURGICAL ONCOLOGY | Facility: CLINIC | Age: 53
End: 2023-06-19
Payer: COMMERCIAL

## 2023-06-19 VITALS
HEIGHT: 62 IN | HEART RATE: 71 BPM | TEMPERATURE: 98.2 F | OXYGEN SATURATION: 96 % | DIASTOLIC BLOOD PRESSURE: 60 MMHG | BODY MASS INDEX: 25.21 KG/M2 | RESPIRATION RATE: 16 BRPM | WEIGHT: 137 LBS | SYSTOLIC BLOOD PRESSURE: 118 MMHG

## 2023-06-19 DIAGNOSIS — Z12.31 VISIT FOR SCREENING MAMMOGRAM: ICD-10-CM

## 2023-06-19 DIAGNOSIS — Z80.3 FAMILY HISTORY OF BREAST CANCER IN FEMALE: Primary | ICD-10-CM

## 2023-06-19 DIAGNOSIS — Z91.89 INCREASED RISK OF BREAST CANCER: ICD-10-CM

## 2023-06-19 PROCEDURE — 99213 OFFICE O/P EST LOW 20 MIN: CPT | Performed by: NURSE PRACTITIONER

## 2023-06-19 NOTE — PROGRESS NOTES
Surgical Oncology Follow Up       [facility]  [facility] [id number]    Karol Godinez  1970  [id number]      Chief Complaint   Patient presents with   • Follow-up       Assessment/Plan:  1  Family history of breast cancer in female  - Ambulatory Referral to Oncology Genetics; Future    2  Increased risk of breast cancer  - MRI Fast Breast; Future    3  Visit for screening mammogram  - Mammo screening bilateral w 3d & cad; Future      Discussion/Summary: Patient is a 49-year-old female that presents today for follow-up visit for a family history of breast cancer, increased risk of breast cancer and fibrocystic changes  I have been screening her with annual 3D mammography and annual automated breast ultrasounds, clinical breast exams every 6 months  She had an ABUS in May 2022 which prompted a diagnostic ultrasound and mammogram of the left breast secondary to a possible mass  Diagnostic imaging revealed postsurgical findings but no worrisome features, 6-month follow-up was recommended  She then had a bilateral diagnostic mammogram and left breast ultrasound in November 2022 which revealed a stable asymmetry of the left breast   She had 1 additional left breast diagnostic ultrasound a few weeks ago which again revealed stable findings, most likely representing scarring, recommendation was to return to routine screening  Patient notices no changes on herself breast exam and there are no concerns on today's exam   I will have her meet with oncology genetics to discuss the possibility of genetic testing given her extensive family history  Given her recent change in family history, we discussed returning to MRI screening rather than automated breast ultrasound  She continues to have dense breast tissue as well  Patient states that she had previously had an extremely high deductible and MRIs were quite costly    She is interested in the fast breast MRI and is aware of the out-of-pocket cost of $299  I will make arrangements for her mammogram in November and for the MRI in May  She will receive a breast exam with her gynecologist in approximately 6 months so I will see her back in 1 year or sooner should the need arise  History of Present Illness:     -Interval History: Patient presents today for follow-up visit  She notices no changes on her self breast exam   She had a recent left breast ultrasound which revealed stable findings, likely representing scarring and has now been recommended to return to routine screening  She states that a maternal second cousin was diagnosed with metastatic breast cancer at age 28 and was  within 1 month from her disease  She believes that she did undergo genetic testing and it was negative  This cousin had a sister that passed away from ovarian cancer under the age of 36  Review of Systems:  Review of Systems   Constitutional: Negative for chills, fatigue and fever  Respiratory: Negative for cough and shortness of breath  Cardiovascular: Negative for chest pain  Hematological: Negative for adenopathy  Psychiatric/Behavioral: Negative for confusion         Patient Active Problem List   Diagnosis   • Family history of breast cancer in female   • Psoriasis   • Allergic rhinitis   • Mixed hyperlipidemia   • Gastroesophageal reflux disease without esophagitis     Past Medical History:   Diagnosis Date   • Abdominal pain    • Abnormal weight gain    • Anxiety    • Bloating     Last assessed: 13   • Bloating    • Endometriosis    • Enlarged lymph node    • Female infertility    • Fibroid uterus    • Gluten intolerance    •  1 para 1    • Headache    • Heartburn    • History of early menarche    • History of ovarian cyst    • Hx of migraines     None recently   • Inconclusive mammogram due to dense breasts    • Increased glucose level    • Increased risk of breast cancer • Interstitial cystitis     Medications no longer needed   • Intolerance to milk products    • Intolerance to milk products    • Irregular menstrual cycle    • Lactose intolerance    • Leiomyoma of uterus, unspecified    • Lichen sclerosus et atrophicus     lastv assessed 92/52/80   • Lichen sclerosus et atrophicus    • Lichen simplex chronicus    • Lichen simplex chronicus    • Lichen simplex chronicus    • Migraine    • Migraine    • Mild heartburn    • Ovarian cyst     Last assessed:01/14/2015   • Pelvic pain in female    • PONV (postoperative nausea and vomiting)    • Psoriasis     Currently on scalp   • Psoriasis    • Psoriasis    • RLQ abdominal pain    • Simple ovarian cyst    • Urinary tract infection    • Wears glasses    • Yeast infection      Past Surgical History:   Procedure Laterality Date   • BREAST BIOPSY Left 11/20/2009    OPEN   • BREAST BIOPSY  01/06/2009    NEEDLE CORE   • BREAST SURGERY Bilateral     Breast reduction   • BUNIONECTOMY Right     5th toe right foot   • CYSTOSCOPY N/A 10/03/2017    Procedure: Fritz Ing;  Surgeon: Monica Myrick DO;  [facility];  Service: Gynecology   • HYSTERECTOMY      supracervical   • KNEE ARTHROSCOPY Right     Right outer meniscus   • KNEE SURGERY Right     last assessed 12/15/14   • LASIK     • LASIK     • LIPOMA RESECTION      x2 on back   • NE LAPS SUPRACRV HYSTERECT 250 GM/< RMVL TUBE/OVAR N/A 10/03/2017    Procedure: HYSTERECTOMY LAPAROSCOPIC SUPRACERVICAL [facility]);   Surgeon: Monica Myrick DO;  [facility];  Service: Gynecology   • REDUCTION MAMMAPLASTY Bilateral    • SALPINGECTOMY Bilateral 10/03/2017    Procedure: SALPINGECTOMY;  Surgeon: Monica Myrick DO;  [facility];  Service: Gynecology   • SALPINGECTOMY Left     Unilateral , last assessed 10/19/17   • WISDOM TOOTH EXTRACTION       Family History   Problem Relation Age of Onset   • Breast cancer Mother 79   • Other Mother         Hypoglycemia   • Hyperlipidemia Father    • Depression Sister    • Lung cancer Maternal Grandmother 80   • Breast cancer Maternal Grandmother 76   • Prostate cancer Maternal Grandfather 60   • Breast cancer Paternal Grandmother 54   • No Known Problems Paternal Grandfather    • Prostate cancer Maternal Uncle 54     Social History     Socioeconomic History   • Marital status:      Spouse name: Not on file   • Number of children: Not on file   • Years of education: Not on file   • Highest education level: Not on file   Occupational History   • Not on file   Tobacco Use   • Smoking status: Never   • Smokeless tobacco: Never   Vaping Use   • Vaping Use: Never used   Substance and Sexual Activity   • Alcohol use: Yes     Comment: 3 or 4 weekly   • Drug use: No   • Sexual activity: Not Currently   Other Topics Concern   • Not on file   Social History Narrative    Caffeine use    Uses safety equipment-seatbelts        Denied History of     Roman Catholic Affiliation None     Social Determinants of Health     Financial Resource Strain: Not on file   Food Insecurity: Not on file   Transportation Needs: Not on file   Physical Activity: Not on file   Stress: Not on file   Social Connections: Not on file   Intimate Partner Violence: Not on file   Housing Stability: Not on file       Current Outpatient Medications:   •  Acidophilus Lactobacillus CAPS, Take 1 capsule by mouth daily, Disp: , Rfl:   •  ALPRAZolam (XANAX) 0 25 mg tablet, Xanax, Disp: , Rfl:   •  Ascorbic Acid (VITAMIN C) 1000 MG tablet, Take 1,000 mg by mouth daily, Disp: , Rfl:   •  butalbital-acetaminophen-caffeine (FIORICET,ESGIC) -40 mg per tablet, butalbital-acetaminophen-caffeine 50 mg-325 mg-40 mg tablet, Disp: , Rfl:   •  calcium carbonate (OS-FREDI) 600 MG tablet, Take 600 mg by mouth in the morning  With magnesium   , Disp: , Rfl:   •  clobetasol (TEMOVATE) 0 05 % external solution, Apply topically to the scalp twice a day for 2 weeks as needed for Psoriasis flares, Disp: 50 mL, Rfl: "3  •  co-enzyme Q-10 30 MG capsule, Take 30 mg by mouth 3 (three) times a day, Disp: , Rfl:   •  Melatonin 1 MG CAPS, Take 3 mg by mouth, Disp: , Rfl:   •  Multiple Vitamins-Minerals (MULTIVITAMIN ADULT EXTRA C PO), multivitamin, Disp: , Rfl:   •  Progesterone 200 MG CAPS, Take 1 tablet by mouth in the morning, Disp: , Rfl:   •  Multiple Vitamin (MULTIVITAMIN) capsule, Take 1 capsule by mouth daily (Patient not taking: Reported on 6/19/2023), Disp: , Rfl:   Allergies   Allergen Reactions   • Nickel Hives and Rash     From plated jewelry or any sort   • Percocet [Oxycodone-Acetaminophen] Other (See Comments)     Jittery and teeth chatter   • Sulfa Antibiotics GI Intolerance and Hives     Severe nausea  \"Nausea \T\ constipation\"   • Wound Dressing Adhesive Rash   • Gluten Meal - Food Allergy GI Intolerance     Negative for celiac disease   • Lactose - Food Allergy GI Intolerance   • Latex      Patient unsure if it was surgical glue or steristrips that were used on knee after surgery that redness and itching  States her surgeon told her to tell people she has a Latex allergy   • Septra [Sulfamethoxazole-Trimethoprim]      Vitals:    06/19/23 1008   BP: 118/60   Pulse: 71   Resp: 16   Temp: 98 2 °F (36 8 °C)   SpO2: 96%       Physical Exam  Vitals reviewed  Constitutional:       Appearance: Normal appearance  HENT:      Head: Normocephalic and atraumatic  Pulmonary:      Effort: Pulmonary effort is normal    Chest:   Breasts:     Right: Skin change (reduction scars) present  No swelling, bleeding, inverted nipple, mass, nipple discharge or tenderness  Left: Skin change (reduction scars) present  No swelling, bleeding, inverted nipple, mass, nipple discharge or tenderness  Lymphadenopathy:      Upper Body:      Right upper body: No supraclavicular or axillary adenopathy  Left upper body: No supraclavicular or axillary adenopathy  Neurological:      General: No focal deficit present        Mental " Status: She is alert and oriented to person, place, and time  Psychiatric:         Mood and Affect: Mood normal            Results:    Imaging    US breast left limited (diagnostic)    Result Date: 5/30/2023  Narrative: DIAGNOSIS: Abnormal findings on diagnostic imaging of breast TECHNIQUE: Ultrasound of the left breast(s) was performed  COMPARISONS: Prior breast imaging dated: 11/29/2022, 11/29/2022, 05/27/2022, 05/27/2022, 05/09/2022, 10/07/2021, 05/24/2021, 10/06/2020, 05/22/2020, 10/10/2019, 10/03/2019, 04/05/2019, 10/02/2018, 04/04/2018, 09/29/2017, 04/04/2017, 09/23/2016, 09/23/2016, 03/18/2016, 03/09/2016, and 08/12/2014 RELEVANT HISTORY: Family Breast Cancer History: History of breast cancer in Mother, Maternal Grandmother, Paternal Grandmother  Family Medical History: Family medical history includes breast cancer in 3 relatives (maternal grandmother, mother, paternal grandmother)  Personal History: Hormone history includes birth control, other and other  Surgical history includes breast biopsy, breast reduction, and hysterectomy  No known relevant medical history  RISK ASSESSMENT: 5 Year Tyrer-Cuzick: 3 55 % 10 Year Tyrer-Cuzick: 7 7 % Lifetime Tyrer-Cuzick: 25 58 % INDICATION: Bonnie Kruger is a 45 y o  female presenting for 6 month follow up  FINDINGS: The small patch of hypoechoic tissue at the 3:00 location 7 cm from nipple is stable  No internal or peripheral flow on Doppler  No worrisome new abnormality seen in the imaged region  Again, this is likely just some scarring  Recommend return to screening     Impression:  Stable exam   Recommend return to screening This patient has been identified as being at elevated risk for development of breast cancer based on the Tyrer-Cuzick model  She may benefit from supplemental screening  ASSESSMENT/BI-RADS CATEGORY: Left: 2 - Benign Overall: 2 - Benign RECOMMENDATION:      - Return to routine screening for both breasts   Workstation ID: [id number] I reviewed the above imaging data  Advance Care Planning/Advance Directives:  Discussed disease status and treatment goals with the patient

## 2023-06-20 ENCOUNTER — TELEPHONE (OUTPATIENT)
Dept: HEMATOLOGY ONCOLOGY | Facility: CLINIC | Age: 53
End: 2023-06-20

## 2023-06-20 NOTE — TELEPHONE ENCOUNTER
I spoke with Celina Moreira to schedule their consultation with Cancer Risk and Genetics  Scheduling Outcome: Patient is scheduled for an appointment on 12/06/23 at 9:30AM with Carolee Johnson    Personal/Family History Related to Appointment:     Personal History of Cancer: Patient reports no personal history of cancer    Family History of Cancer: Patient reports family history of Breast cancer - Mother, M Grandmother, P Grandmother, Cousin 1, Cousin 2 (dx age 28) [address] - M Grandfather, M Uncle Ovarian Cancer - Cousin 3    Is patient of [address] [place] [address] [facility] No    History of Genetic Testing:  Personal History of Genetic Testing: Patient report no personal history of Genetic Testing  Family History of Genetic Testing: Patient reports that member(s) of their family have had Genetic Testing  Details: Patient reports that two of her paternal second cousins have had genetic testing and were BRCA neg    Progeny:  Is patient able to complete our family history questionnaire on a computer?:  Yes    Patient's preferred e-mail address: [email or web address]

## 2023-10-12 NOTE — PROGRESS NOTES
Assessment/Plan:    pap is up to date    High risk for breast cancer - she sees breast sx and is going to have genetic testing. mammogram reviewed with her including breast density. They order her testing and she is going to have 3D mammogram and fast breast MRI instead of an ABUS  Discussed self breast exams    colon cancer screening - colonoscopy at age 46, recall in 10 years    Lichen sclerosus - she has clobetasol solution that she uses for her scalp psoriasis and also uses this for vulva. I renewed this for her. Left ovarian cyst-probable endometrioma and this has been stable. We will recheck next year at her yearly since she had the ultrasound in June. discussed preventive care, regular exercise and a healthy diet      No problem-specific Assessment & Plan notes found for this encounter. Diagnoses and all orders for this visit:    Psoriasis  -     clobetasol (TEMOVATE) 0.05 % external solution; Apply topically to the scalp twice a day for 2 weeks as needed for Psoriasis flares          Subjective:      Patient ID: Evelyn Velasquez is a 52 y.o. female. Pt here for yearly. She has no GYN complaints. She has a stable left ovarian cyst.  S/p supracervical hysterectomy and b/l salpingectomy  Normal pap, negative HPV in     Stable left ovarian cyst on US from June. We have been following this and it appears to be an endometrioma this was part of an executive screening. She is seeing breast sx due to elevated risk and strong family history of breast cancer. Her cousin was diagnosed with stage IV breast cancer at age 28 earlier this year. She passed away shortly after being diagnosed. She uses biestrogen cream, oral micronized progesterone. These are ordered by Dr. Sheila Grier.         The following portions of the patient's history were reviewed and updated as appropriate: allergies, current medications, past family history, past medical history, past social history, past surgical history, and problem list.    Review of Systems   Constitutional: Negative. Gastrointestinal: Negative. Genitourinary: Negative. Objective:      LMP 09/09/2017 (Exact Date)          Physical Exam  Vitals reviewed. Constitutional:       Appearance: Normal appearance. She is well-developed. Neck:      Thyroid: No thyromegaly. Trachea: No tracheal deviation. Cardiovascular:      Rate and Rhythm: Normal rate and regular rhythm. Pulmonary:      Effort: Pulmonary effort is normal.      Breath sounds: Normal breath sounds. Chest:   Breasts:     Breasts are symmetrical.      Right: No inverted nipple, mass, nipple discharge, skin change or tenderness. Left: No inverted nipple, mass, nipple discharge, skin change or tenderness. Abdominal:      General: There is no distension. Palpations: Abdomen is soft. There is no mass. Tenderness: There is no abdominal tenderness. Genitourinary:     Labia:         Right: No rash, tenderness, lesion or injury. Left: No rash, tenderness, lesion or injury. Vagina: Normal.      Cervix: No cervical motion tenderness, discharge or friability. Uterus: Absent. Adnexa:         Right: No mass, tenderness or fullness. Left: No mass, tenderness or fullness. Rectum: Normal.      Comments: Mild erythema on perineum  Cervix was normal, uterus is surgically absent  Neurological:      Mental Status: She is alert.

## 2023-10-13 ENCOUNTER — ANNUAL EXAM (OUTPATIENT)
Dept: GYNECOLOGY | Facility: CLINIC | Age: 53
End: 2023-10-13
Payer: COMMERCIAL

## 2023-10-13 VITALS
SYSTOLIC BLOOD PRESSURE: 114 MMHG | WEIGHT: 137.8 LBS | DIASTOLIC BLOOD PRESSURE: 62 MMHG | BODY MASS INDEX: 24.41 KG/M2 | HEIGHT: 63 IN

## 2023-10-13 DIAGNOSIS — Z01.419 ENCOUNTER FOR ANNUAL ROUTINE GYNECOLOGICAL EXAMINATION: Primary | ICD-10-CM

## 2023-10-13 DIAGNOSIS — L40.9 PSORIASIS: ICD-10-CM

## 2023-10-13 PROCEDURE — S0612 ANNUAL GYNECOLOGICAL EXAMINA: HCPCS | Performed by: OBSTETRICS & GYNECOLOGY

## 2023-10-13 RX ORDER — CLOBETASOL PROPIONATE 0.46 MG/ML
SOLUTION TOPICAL
Qty: 50 ML | Refills: 3 | Status: SHIPPED | OUTPATIENT
Start: 2023-10-13

## 2023-11-13 ENCOUNTER — DOCUMENTATION (OUTPATIENT)
Dept: GENETICS | Facility: CLINIC | Age: 53
End: 2023-11-13

## 2023-11-29 ENCOUNTER — TELEPHONE (OUTPATIENT)
Dept: GENETICS | Facility: CLINIC | Age: 53
End: 2023-11-29

## 2023-12-01 ENCOUNTER — HOSPITAL ENCOUNTER (OUTPATIENT)
Dept: MAMMOGRAPHY | Facility: MEDICAL CENTER | Age: 53
Discharge: HOME | End: 2023-12-01
Payer: COMMERCIAL

## 2023-12-01 VITALS — HEIGHT: 63 IN | WEIGHT: 137 LBS | BODY MASS INDEX: 24.27 KG/M2

## 2023-12-01 DIAGNOSIS — Z12.31 VISIT FOR SCREENING MAMMOGRAM: ICD-10-CM

## 2023-12-01 PROCEDURE — 77063 BREAST TOMOSYNTHESIS BI: CPT

## 2023-12-01 PROCEDURE — 77067 SCR MAMMO BI INCL CAD: CPT

## 2023-12-06 ENCOUNTER — DOCUMENTATION (OUTPATIENT)
Dept: GENETICS | Facility: CLINIC | Age: 53
End: 2023-12-06

## 2023-12-06 ENCOUNTER — CLINICAL SUPPORT (OUTPATIENT)
Dept: GENETICS | Facility: CLINIC | Age: 53
End: 2023-12-06
Payer: COMMERCIAL

## 2023-12-06 DIAGNOSIS — Z80.3 FAMILY HISTORY OF BREAST CANCER IN FEMALE: Primary | ICD-10-CM

## 2023-12-06 DIAGNOSIS — Z80.3 FAMILY HISTORY OF BREAST CANCER: ICD-10-CM

## 2023-12-06 DIAGNOSIS — Z80.41 FAMILY HISTORY OF OVARIAN CANCER: ICD-10-CM

## 2023-12-06 DIAGNOSIS — Z80.42 FAMILY HISTORY OF PROSTATE CANCER: ICD-10-CM

## 2023-12-06 PROCEDURE — 36415 COLL VENOUS BLD VENIPUNCTURE: CPT

## 2023-12-06 NOTE — PROGRESS NOTES
Pre-Test Genetic Counseling Consult Note    Patient Name: Shira Gonzalez   /Age: / y.o. Referring Provider: SEVERO Mabry    Date of Service: 2023  Genetic Counselor[place] Haydee Lopez [place] [facility] [place]  Genetic Counseling Student: Jennifer Vizcarra genetic counseling student completed today's appointment under my supervision  Interpretation Services: None  [facility] [address] LINDSAY was referred to the [address] [place], [place] [id number] and Genetic Assessment Program due to her family history of breast cancer . she presents today to discuss the possibility of a hereditary cancer syndrome, options for genetic testing, and implications for her and her family. Cancer History and Treatment:   Personal History:   Oncology History    No history exists.      Screening Hx:   Breast:  Breast Imagin23  Breast Biopsy: previous biopsies  Breast Density: Scattered fibroglandular density   Scheduled for Fast Breast MRI; will usually get breast MRI or ABUS    Colon:  Colonoscopy: 17  10 year recall    Gynecologic:  Ovaries in tact; TSH and bilateral salpingectomy at 50    Skin:  Sees derm annually    Other screening: annual abdominal ultrasound through work program    Reproductive History  Age at menarche: 13  Age at first live birth: Nulliparous  Menopause: Post Menopausal (46)  Hormone replacement: current (for 2 years) combined     Medical and Surgical History  Pertinent surgical history:   Past Surgical History:   Procedure Laterality Date    BREAST BIOPSY Left 2009    OPEN    BREAST BIOPSY  2009    NEEDLE CORE    BREAST SURGERY Bilateral     Breast reduction    BUNIONECTOMY Right     5th toe right foot    CYSTOSCOPY N/A 10/03/2017    Procedure: CYSTOSCOPY;  Surgeon: Michelle Pineda DO;  [facility];  Service: Gynecology    HYSTERECTOMY      supracervical    KNEE ARTHROSCOPY Right     Right outer meniscus    KNEE SURGERY Right     last assessed 12/15/14    LASIK      LASIK      LIPOMA RESECTION      x2 on back    OK LAPS SUPRACRV HYSTERECT 250 GM/< RMVL TUBE/OVAR N/A 10/03/2017    Procedure: HYSTERECTOMY LAPAROSCOPIC SUPRACERVICAL [facility]);   Surgeon: Jazlyn Alvarado DO;  [facility];  Service: Gynecology    REDUCTION MAMMAPLASTY Bilateral     1991    SALPINGECTOMY Bilateral 10/03/2017    Procedure: SALPINGECTOMY;  Surgeon: Jazlyn Alvarado DO;  [facility];  Service: Gynecology    SALPINGECTOMY Left     Unilateral , last assessed 10/19/17    WISDOM TOOTH EXTRACTION        Pertinent medical history:  Past Medical History:   Diagnosis Date    Abdominal pain     Abnormal weight gain     Anxiety     Bloating     Last assessed: 13    Bloating     Endometriosis     Enlarged lymph node     Female infertility     Fibroid uterus     Gluten intolerance      1 para 1     Headache     Heartburn     History of early menarche     History of ovarian cyst     Hx of migraines     None recently    Inconclusive mammogram due to dense breasts     Increased glucose level     Increased risk of breast cancer     Interstitial cystitis     Medications no longer needed    Intolerance to milk products     Intolerance to milk products     Irregular menstrual cycle     Lactose intolerance     Leiomyoma of uterus, unspecified     Lichen sclerosus et atrophicus     lastv assessed     Lichen sclerosus et atrophicus     Lichen simplex chronicus     Lichen simplex chronicus     Lichen simplex chronicus     Migraine     Migraine     Mild heartburn     Ovarian cyst     Last assessed:2015    Pelvic pain in female     PONV (postoperative nausea and vomiting)     Psoriasis     Currently on scalp    Psoriasis     Psoriasis     RLQ abdominal pain     Simple ovarian cyst     Urinary tract infection     Wears glasses     Yeast infection          Other History:  Height:   Ht Readings from Last 1 Encounters:   23 5' 3" (1.6 m) Weight:   Wt Readings from Last 1 Encounters:   12/01/23 62.1 kg (137 lb)       Relevant Family History   Patient reports no Ashkenazi Baptism ancestry. Please refer to the scanned pedigree in the Media Tab for a complete family history     *All history is reported as provided by the patient; records are not available for review, except where indicated. Assessment:  We discussed sporadic, familial and hereditary cancer. We also discussed the many factors that influence our risk for cancer such as age, environmental exposures, lifestyle choices and family history. We reviewed the indications suggestive of a hereditary predisposition to cancer. Manjit Lopez is unaffected, but is considering genetic testing due to a family history of breast and ovarian cancer. Although Ivonne's mother is the most informative person to undergo genetic testing, Manjit Lopez can consider genetic testing due to the following:   Patient does not have cancer; however, their mother has declined testing and therefore patient is the most informative person for testing. Genetic testing is indicated for Manjit Lopez based on the following criteria: Meets NCCN V2.2024 Testing Criteria for High-Penetrance Breast Cancer Susceptibility Genes: family history of a first-degree relative diagnosed with breast cancer with 2 or more close-blood relatives diagnosed with breast cancer at any age    The risks, benefits, and limitations of genetic testing were reviewed with the patient, as well as genetic discrimination laws, and possible test results (positive, negative, variants of uncertain significance) and their clinical implications. If positive for a mutation, options for managing cancer risk including increased surveillance, chemoprevention, and in some cases prophylactic surgery were discussed.  Manjit Lopez was informed that if a hereditary cancer syndrome was identified in her, first degree relatives (parents, siblings, and children) have a chance of also inheriting the condition. Genetic testing would allow for predictive genetic testing in other relatives, who may also be at risk depending on their degree of relation. Billing:  Most individuals pay <$100 for hereditary cancer genetic testing. If insurance covers the cost of the testing, individuals may still pay out of pocket secondary to co-pays, co-insurance, or deductibles. If the cost of the testing exceeds $100, the lab will reach out to the patient via phone or e-mail. The patient will then have the option to proceed with the testing, cancel the testing, or elect the self-pay option of $250. Dasia Calvin verbalized understanding. Plan: Patient decided to proceed with testing and provided consent. Summary:     Sample Collection:  The patient's blood sample was drawn in the office on 12/6 by medical assistant Lior Alonzo    Genetic Testing Preformed: [employer]: Cancer® +RNAinsight® (61 genes): APC, RACH, AXIN2, BAP1, BARD1, BMPR1A, BRCA1, BRCA2, BRIP1, CDH1, CDK4, CDKN1B, CDKN2A, CHEK2, CTNNA1, DICER1, EGLN1, EPCAM, FH, FLCN, GREM1, HOXB13, KIF1B, KIT, MAX, MEN1, MET, MITF, MLH1, MSH2, MSH3, MSH6, MUTYH, NBN, NF1, NTHL1, PALB2, PMS2, POLD1, POLE, POT1, PTEN, RAD50, RAD51C, RAD51D, RB1, RECQL, RET, SDHA, SDHAF2, SDHB, SDHC, SDHD, SMAD4, SMARCA4, STK11, UTPQ298, TP53, TSC1, TSC2, VHL     Result Call Information:  In the event that we need to reach Dasia Calvin via telephone:  I confirmed the patient's mobile number on file as the best number to call with results  I confirmed with the patient that we can leave a voicemail on the provided numbers    Results take approximately 2-3 weeks to complete once test is started. Dasia Calvin will be notified via [employer] once results are available. Additional recommendations for surveillance/medical management will be made pending genetic test results.

## 2023-12-06 NOTE — LETTER
2023     [address] [facility] [address]    Patient: Evelyn Velasquez  YOB: 1970  Date of Visit: 2023      Dear Dr. Natalie Omalley: Thank you for referring Geroge Litten to me for evaluation. Below are my notes for this consultation. If you have questions, please do not hesitate to call me. I look forward to following your patient along with you. Sincerely,        Lida Cline        CC: Isa Oshea DO        Pre-Test Genetic Counseling Consult Note    Patient Name: Evelyn Velasquez   /Age:  y.o. Referring Provider: SEVERO Escobar    Date of Service: 2023  Genetic Counselor: Lida Cline MS, [facility]  Genetic Counseling Student: Corky Chacon genetic counseling student completed today's appointment under my supervision  Interpretation Services: None  [facility] 59 [address] LINDSAY was referred to the [address] [place], [place] [id number] [facility] and Genetic Assessment Program due to her family history of breast cancer . she presents today to discuss the possibility of a hereditary cancer syndrome, options for genetic testing, and implications for her and her family. Cancer History and Treatment:   Personal History:   Oncology History    No history exists.      Screening Hx:   Breast:  Breast Imagin23  Breast Biopsy: previous biopsies  Breast Density: Scattered fibroglandular density   Scheduled for Fast Breast MRI; will usually get breast MRI or ABUS    Colon:  Colonoscopy: 17  10 year recall    Gynecologic:  Ovaries in tact; TSH and bilateral salpingectomy at 50    Skin:  Sees derm annually    Other screening: annual abdominal ultrasound through work program    Reproductive History  Age at menarche: 13  Age at first live birth: Nulliparous  Menopause: Post Menopausal (46)  Hormone replacement: current (for 2 years) combined     Medical and Surgical History  Pertinent surgical history:   Past Surgical History:   Procedure Laterality Date   • BREAST BIOPSY Left 2009    OPEN   • BREAST BIOPSY  2009    NEEDLE CORE   • BREAST SURGERY Bilateral     Breast reduction   • BUNIONECTOMY Right     5th toe right foot   • CYSTOSCOPY N/A 10/03/2017    Procedure: CYSTOSCOPY;  Surgeon: Sumi Bustos DO;  [facility];  Service: Gynecology   • HYSTERECTOMY      supracervical   • KNEE ARTHROSCOPY Right     Right outer meniscus   • KNEE SURGERY Right     last assessed 12/15/14   • LASIK     • LASIK     • LIPOMA RESECTION      x2 on back   • GA LAPS SUPRACRV HYSTERECT 250 GM/< RMVL TUBE/OVAR N/A 10/03/2017    Procedure: HYSTERECTOMY LAPAROSCOPIC SUPRACERVICAL [facility]);   Surgeon: Sumi Bustos DO;  [facility];  Service: Gynecology   • REDUCTION MAMMAPLASTY Bilateral        • SALPINGECTOMY Bilateral 10/03/2017    Procedure: SALPINGECTOMY;  Surgeon: Sumi Bustos DO;  [facility];  Service: Gynecology   • SALPINGECTOMY Left     Unilateral , last assessed 10/19/17   • WISDOM TOOTH EXTRACTION        Pertinent medical history:  Past Medical History:   Diagnosis Date   • Abdominal pain    • Abnormal weight gain    • Anxiety    • Bloating     Last assessed: 13   • Bloating    • Endometriosis    • Enlarged lymph node    • Female infertility    • Fibroid uterus    • Gluten intolerance    •  1 para 1    • Headache    • Heartburn    • History of early menarche    • History of ovarian cyst    • Hx of migraines     None recently   • Inconclusive mammogram due to dense breasts    • Increased glucose level    • Increased risk of breast cancer    • Interstitial cystitis     Medications no longer needed   • Intolerance to milk products    • Intolerance to milk products    • Irregular menstrual cycle    • Lactose intolerance    • Leiomyoma of uterus, unspecified    • Lichen sclerosus et atrophicus     lastv assessed    • Lichen sclerosus et atrophicus    • Lichen simplex chronicus    • Lichen simplex chronicus    • Lichen simplex chronicus    • Migraine    • Migraine    • Mild heartburn    • Ovarian cyst     Last assessed:01/14/2015   • Pelvic pain in female    • PONV (postoperative nausea and vomiting)    • Psoriasis     Currently on scalp   • Psoriasis    • Psoriasis    • RLQ abdominal pain    • Simple ovarian cyst    • Urinary tract infection    • Wears glasses    • Yeast infection          Other History:  Height:   Ht Readings from Last 1 Encounters:   12/01/23 5' 3" (1.6 m)     Weight:   Wt Readings from Last 1 Encounters:   12/01/23 62.1 kg (137 lb)       Relevant Family History   Patient reports no Ashkenazi [employer] ancestry. Please refer to the scanned pedigree in the Media Tab for a complete family history     *All history is reported as provided by the patient; records are not available for review, except where indicated. Assessment:  We discussed sporadic, familial and hereditary cancer. We also discussed the many factors that influence our risk for cancer such as age, environmental exposures, lifestyle choices and family history. We reviewed the indications suggestive of a hereditary predisposition to cancer. Germán Teague is unaffected, but is considering genetic testing due to a family history of breast and ovarian cancer. Although Ivonne's mother is the most informative person to undergo genetic testing, Germán Teague can consider genetic testing due to the following:   Patient does not have cancer; however, their mother has declined testing and therefore patient is the most informative person for testing.     Genetic testing is indicated for Germán Teague based on the following criteria: Meets NCCN V2.2024 Testing Criteria for High-Penetrance Breast Cancer Susceptibility Genes: family history of a first-degree relative diagnosed with breast cancer with 2 or more close-blood relatives diagnosed with breast cancer at any age    The risks, benefits, and limitations of genetic testing were reviewed with the patient, as well as genetic discrimination laws, and possible test results (positive, negative, variants of uncertain significance) and their clinical implications. If positive for a mutation, options for managing cancer risk including increased surveillance, chemoprevention, and in some cases prophylactic surgery were discussed. Latha Ayoub was informed that if a hereditary cancer syndrome was identified in her, first degree relatives (parents, siblings, and children) have a chance of also inheriting the condition. Genetic testing would allow for predictive genetic testing in other relatives, who may also be at risk depending on their degree of relation. Billing:  Most individuals pay <$100 for hereditary cancer genetic testing. If insurance covers the cost of the testing, individuals may still pay out of pocket secondary to co-pays, co-insurance, or deductibles. If the cost of the testing exceeds $100, the lab will reach out to the patient via phone or e-mail. The patient will then have the option to proceed with the testing, cancel the testing, or elect the self-pay option of $250. Latha Ayoub verbalized understanding. Plan: Patient decided to proceed with testing and provided consent.     Summary:     Sample Collection:  The patient's blood sample was drawn in the office on 12/6 by medical assistant Darius Benavidez    Genetic Testing Preformed: CustomNext: Cancer® +RNAinsight® (61 genes): APC, RACH, AXIN2, BAP1, BARD1, BMPR1A, BRCA1, BRCA2, BRIP1, CDH1, CDK4, CDKN1B, CDKN2A, CHEK2, CTNNA1, DICER1, EGLN1, EPCAM, FH, FLCN, GREM1, HOXB13, KIF1B, KIT, MAX, MEN1, MET, MITF, MLH1, MSH2, MSH3, MSH6, MUTYH, NBN, NF1, NTHL1, PALB2, PMS2, POLD1, POLE, POT1, PTEN, RAD50, RAD51C, RAD51D, RB1, RECQL, RET, SDHA, SDHAF2, SDHB, SDHC, SDHD, SMAD4, SMARCA4, STK11, ULNQ413, TP53, TSC1, TSC2, VHL     Result Call Information:  In the event that we need to reach Lady Jimenez via telephone:  I confirmed the patient's mobile number on file as the best number to call with results  I confirmed with the patient that we can leave a voicemail on the provided numbers    Results take approximately 2-3 weeks to complete once test is started. Lady Jimenez will be notified via Honkt once results are available. Additional recommendations for surveillance/medical management will be made pending genetic test results.

## 2024-06-04 PROBLEM — H69.90 DYSFUNCTION OF EUSTACHIAN TUBE: Status: ACTIVE | Noted: 2024-06-04

## 2024-06-04 PROBLEM — Z01.10 NORMAL HEARING EXAM: Status: ACTIVE | Noted: 2024-06-04

## 2024-06-04 PROBLEM — Z01.10 NORMAL HEARING EXAM: Status: RESOLVED | Noted: 2024-06-04 | Resolved: 2024-06-04

## 2024-06-05 ENCOUNTER — HOSPITAL ENCOUNTER (OUTPATIENT)
Dept: MRI IMAGING | Facility: HOSPITAL | Age: 54
Discharge: HOME | End: 2024-06-05
Payer: COMMERCIAL

## 2024-06-05 DIAGNOSIS — Z91.89 INCREASED RISK OF BREAST CANCER: ICD-10-CM

## 2024-06-05 PROCEDURE — A9585 GADOBUTROL INJECTION: HCPCS | Performed by: NURSE PRACTITIONER

## 2024-06-05 RX ORDER — GADOBUTROL 604.72 MG/ML
6 INJECTION INTRAVENOUS
Status: COMPLETED | OUTPATIENT
Start: 2024-06-05 | End: 2024-06-05

## 2024-06-05 RX ADMIN — GADOBUTROL 6 ML: 604.72 INJECTION INTRAVENOUS at 11:19

## 2024-06-14 ENCOUNTER — OFFICE VISIT (OUTPATIENT)
Dept: SURGICAL ONCOLOGY | Facility: CLINIC | Age: 54
End: 2024-06-14
Payer: COMMERCIAL

## 2024-06-14 VITALS
TEMPERATURE: 98.5 F | HEIGHT: 63 IN | WEIGHT: 138.4 LBS | HEART RATE: 72 BPM | DIASTOLIC BLOOD PRESSURE: 80 MMHG | OXYGEN SATURATION: 98 % | RESPIRATION RATE: 14 BRPM | BODY MASS INDEX: 24.52 KG/M2 | SYSTOLIC BLOOD PRESSURE: 116 MMHG

## 2024-06-14 DIAGNOSIS — Z80.3 FAMILY HISTORY OF BREAST CANCER IN FEMALE: Primary | ICD-10-CM

## 2024-06-14 DIAGNOSIS — Z12.31 VISIT FOR SCREENING MAMMOGRAM: ICD-10-CM

## 2024-06-14 DIAGNOSIS — Z91.89 INCREASED RISK OF BREAST CANCER: ICD-10-CM

## 2024-06-14 PROCEDURE — 99213 OFFICE O/P EST LOW 20 MIN: CPT | Performed by: NURSE PRACTITIONER

## 2024-06-14 NOTE — PROGRESS NOTES
Surgical Oncology Follow Up       [address]  [facility] [place]  [address]  [facility] [address]    Ivonne Mcneal  1970  [id number]      Chief Complaint   Patient presents with    Follow-up       Assessment/Plan:  1. Family history of breast cancer in female  - MRI Fast Breast; Future    2. Increased risk of breast cancer  - MRI Fast Breast; Future    3. Visit for screening mammogram  - Mammo screening bilateral w 3d & cad; Future      Discussion/Summary: Patient is a 53-year-old female that presents today for follow-up visit for a family history of breast cancer, increased risk of breast cancer and fibrocystic changes. She underwent genetic testing which was negative.  I am screening her with annual 3D mammography and fast breast MRI.  She has an extremely high deductible therefore prefers the fast MRI.  She had a bilateral mammogram in December 2023 which was BI-RADS 2, category 2 density.  She had the fast breast MRI in June 2024 which was BI-RADS 1.  She offers no new complaints today and there are no worrisome findings on today's clinical exam.  Prescription given for mammogram and fast breast MRI.  She will receive a clinical breast exam with her gynecologist in approximately 6 months so I will see her back in 1 year or sooner should the need arise.  She was instructed to contact us with any changes or concerns in the interim.  All of her questions were answered today.    History of Present Illness:     -Interval History: Patient presents today for follow-up visit.  She has not appreciated any changes on self-exam.  She had a fast breast MRI which was benign. She underwent genetic testing which was negative.         Test Ordered: CustomNext: Cancer® +RNAinsight® (61 genes): APC, RACH, AXIN2, BAP1, BARD1, BMPR1A, BRCA1, BRCA2, BRIP1, CDH1, CDK4, CDKN1B, CDKN2A, CHEK2, CTNNA1, DICER1, EGLN1, EPCAM, FH, FLCN, GREM1, HOXB13, KIF1B, KIT, MAX, MEN1, MET,  MITF, MLH1, MSH2, MSH3, MSH6, MUTYH, NBN, NF1, NTHL1, PALB2, PMS2, POLD1, POLE, POT1, PTEN, RAD50, RAD51C, RAD51D, RB1, RECQL, RET, SDHA, SDHAF2, SDHB, SDHC, SDHD, SMAD4, SMARCA4, STK11, NOGK517, TP53, TSC1, TSC2, VHL      Result: Negative - No Clinically Significant Variants Detected    Review of Systems:  Review of Systems   Constitutional:  Negative for chills, fatigue and fever.   Respiratory:  Negative for cough and shortness of breath.    Cardiovascular:  Negative for chest pain.   Hematological:  Negative for adenopathy.   Psychiatric/Behavioral:  Negative for confusion.        Patient Active Problem List   Diagnosis    Family history of breast cancer in female    Psoriasis    Allergic rhinitis    Mixed hyperlipidemia    Gastroesophageal reflux disease without esophagitis    Increased risk of breast cancer    Dysfunction of eustachian tube     Past Medical History:   Diagnosis Date    Abdominal pain     Abnormal weight gain     Anxiety     Bloating     Last assessed: 13    Bloating     Endometriosis     Enlarged lymph node     Female infertility     Fibroid uterus     GERD (gastroesophageal reflux disease)     Gluten intolerance      1 para 1     Headache     Heartburn     History of early menarche     History of ovarian cyst     Hx of migraines     None recently    Inconclusive mammogram due to dense breasts     Increased glucose level     Increased risk of breast cancer     Interstitial cystitis     Medications no longer needed    Intolerance to milk products     Intolerance to milk products     Irregular menstrual cycle     Lactose intolerance     Leiomyoma of uterus, unspecified     Lichen sclerosus et atrophicus     lastv assessed 12/17/15    Lichen sclerosus et atrophicus     Lichen simplex chronicus     Lichen simplex chronicus     Lichen simplex chronicus     Migraine     Migraine     Mild heartburn     Ovarian cyst     Last assessed:2015    Pelvic pain in female     PONV  (postoperative nausea and vomiting)     Psoriasis     Currently on scalp    Psoriasis     Psoriasis     RLQ abdominal pain     Simple ovarian cyst     Urinary tract infection     Wears glasses     Yeast infection      Past Surgical History:   Procedure Laterality Date    BREAST BIOPSY Left 11/20/2009    OPEN    BREAST BIOPSY  01/06/2009    NEEDLE CORE    BREAST SURGERY Bilateral     Breast reduction    BUNIONECTOMY Right     5th toe right foot    CYSTOSCOPY N/A 10/03/2017    Procedure: CYSTOSCOPY;  Surgeon: Virginie Cuba DO;  [facility];  Service: Gynecology    HYSTERECTOMY      supracervical    KNEE ARTHROSCOPY Right     Right outer meniscus    KNEE SURGERY Right     last assessed 12/15/14    LASIK      LASIK      LIPOMA RESECTION      x2 on back    NV LAPS SUPRACRV HYSTERECT 250 GM/< RMVL TUBE/OVAR N/A 10/03/2017    Procedure: HYSTERECTOMY LAPAROSCOPIC SUPRACERVICAL ([facility]);  Surgeon: Virginie Cuba DO;  [facility];  Service: Gynecology    REDUCTION MAMMAPLASTY Bilateral     1991    SALPINGECTOMY Bilateral 10/03/2017    Procedure: SALPINGECTOMY;  Surgeon: Virginie Cuba DO;  [facility];  Service: Gynecology    SALPINGECTOMY Left     Unilateral , last assessed 10/19/17    WISDOM TOOTH EXTRACTION       Family History   Problem Relation Age of Onset    Breast cancer Mother 67    Other Mother         Hypoglycemia    Cancer Mother         Breast cancer    Hyperlipidemia Mother     Hyperlipidemia Father     Rashes / Skin problems Father         Psoriasis    Depression Sister     Lung cancer Maternal Grandmother 86    Breast cancer Maternal Grandmother 75    Cancer Maternal Grandmother         Breast Cancer    Prostate cancer Maternal Grandfather 60    Cancer Maternal Grandfather         Prostate Cancer    Breast cancer Paternal Grandmother 55    Cancer Paternal Grandmother         Breast Cancer    No Known Problems Paternal Grandfather     Prostate cancer Maternal Uncle 55    Breast cancer Cousin  35        second cousin (maternal) stage 4 at diagnosis. Genetics negative    Breast cancer Cousin 32        second cousin (maternal). Genetic testing negative    Ovarian cancer Cousin         sister to madyson Wright prior to age 40    Cancer Maternal Uncle         Prostate cancer     Social History     Socioeconomic History    Marital status:      Spouse name: Not on file    Number of children: Not on file    Years of education: Not on file    Highest education level: Not on file   Occupational History    Not on file   Tobacco Use    Smoking status: Never    Smokeless tobacco: Never   Vaping Use    Vaping status: Never Used   Substance and Sexual Activity    Alcohol use: Yes     Alcohol/week: 4.0 standard drinks of alcohol     Types: 4 Shots of liquor per week     Comment: 3 or 4 weekly    Drug use: No    Sexual activity: Not Currently     Partners: Male     Comment: Hysterectomy   Other Topics Concern    Not on file   Social History Narrative    Caffeine use    Uses safety equipment-seatbelts        Denied History of     Hinduism Affiliation None     Social Determinants of Health     Financial Resource Strain: Not on file   Food Insecurity: Not on file   Transportation Needs: Not on file   Physical Activity: Not on file   Stress: Not on file   Social Connections: Not on file   Intimate Partner Violence: Not on file   Housing Stability: Not on file       Current Outpatient Medications:     ALPRAZolam (XANAX) 0.25 mg tablet, Xanax, Disp: , Rfl:     Ascorbic Acid (VITAMIN C) 1000 MG tablet, Take 1,000 mg by mouth daily, Disp: , Rfl:     butalbital-acetaminophen-caffeine (FIORICET,ESGIC) -40 mg per tablet, butalbital-acetaminophen-caffeine 50 mg-325 mg-40 mg tablet, Disp: , Rfl:     calcium carbonate (OS-FREDI) 600 MG tablet, Take 600 mg by mouth in the morning. With magnesium ., Disp: , Rfl:     clobetasol (TEMOVATE) 0.05 % external solution, Apply topically to the scalp twice a day for 2 weeks as  "needed for Psoriasis flares, Disp: 50 mL, Rfl: 3    co-enzyme Q-10 30 MG capsule, Take 30 mg by mouth 3 (three) times a day, Disp: , Rfl:     Melatonin 1 MG CAPS, Take 3 mg by mouth, Disp: , Rfl:     Multiple Vitamins-Minerals (MULTIVITAMIN ADULT EXTRA C PO), multivitamin, Disp: , Rfl:     NON FORMULARY, Compounded Bi-Estrogen, Disp: , Rfl:     Progesterone 200 MG CAPS, Take 1 tablet by mouth in the morning, Disp: , Rfl:     Acidophilus Lactobacillus CAPS, Take 1 capsule by mouth daily, Disp: , Rfl:     Multiple Vitamin (MULTIVITAMIN) capsule, Take 1 capsule by mouth daily (Patient not taking: Reported on 6/19/2023), Disp: , Rfl:   Allergies   Allergen Reactions    Nickel Hives and Rash     From plated jewelry or any sort    Percocet [Oxycodone-Acetaminophen] Other (See Comments)     Jittery and teeth chatter    Sulfa Antibiotics GI Intolerance and Hives     Severe nausea  \"Nausea \T\ constipation\"    Wound Dressing Adhesive Rash    Gluten Meal - Food Allergy GI Intolerance     Negative for celiac disease    Lactose - Food Allergy GI Intolerance    Latex      Patient unsure if it was surgical glue or steristrips that were used on knee after surgery that redness and itching. States her surgeon told her to tell people she has a Latex allergy    Septra [Sulfamethoxazole-Trimethoprim]      Vitals:    06/14/24 1000   BP: 116/80   Pulse: 72   Resp: 14   Temp: 98.5 °F (36.9 °C)   SpO2: 98%       Physical Exam  Vitals reviewed.   Constitutional:       Appearance: Normal appearance.   HENT:      Head: Normocephalic and atraumatic.   Pulmonary:      Effort: Pulmonary effort is normal.   Chest:   Breasts:     Right: Skin change (reduction scars) present. No swelling, bleeding, inverted nipple, mass, nipple discharge or tenderness.      Left: Skin change (reduction scars) present. No swelling, bleeding, inverted nipple, mass, nipple discharge or tenderness.   Lymphadenopathy:      Upper Body:      Right upper body: No " supraclavicular or axillary adenopathy.      Left upper body: No supraclavicular or axillary adenopathy.   Neurological:      General: No focal deficit present.      Mental Status: She is alert and oriented to person, place, and time.   Psychiatric:         Mood and Affect: Mood normal.           Results:    Imaging  MRI Fast Breast    Result Date: 6/5/2024  Narrative: DIAGNOSIS: Increased risk of breast cancer  TECHNIQUE: Fast Breast MRI Screening Protocol MRI of both breasts was performed in axial planes utilizing a protocol specifically tailored for breast cancer screening. Pulse sequences included axial T2 STIR and axial 3D fat suppressed gradient-echo T1 sequences (VIBRANT) before and after contrast administration. Subtraction and MIP images were generated.  MEDICATIONS ADMINISTERED: Gadobutrol injection (SINGLE-DOSE) SOLN 6 mL, Total Given: 6 mL (1 dose) Intravenous contrast was administered at 2cc/sec, without documented contrast reaction.  COMPARISONS: Prior breast imaging dated: 12/01/2023, 05/30/2023, 11/29/2022, 11/29/2022, 05/27/2022, 05/27/2022, 05/09/2022, 10/07/2021, 05/24/2021, 10/06/2020, 05/22/2020, 10/10/2019, 10/03/2019, 04/05/2019, 10/02/2018, 04/04/2018, 09/29/2017, 04/04/2017, 09/23/2016, 09/23/2016, 03/18/2016, 03/09/2016, and 08/12/2014  RELEVANT HISTORY: Family Breast Cancer History: History of breast cancer in Mother, Maternal Grandmother, Paternal Grandmother, Cousin, Cousin. Family Medical History: Family medical history includes breast cancer in 5 relatives (cousin, cousin, maternal grandmother, mother, paternal grandmother) and ovarian cancer in cousin. Personal History: Hormone history includes birth control, other and other. Surgical history includes breast biopsy, breast reduction, and hysterectomy. No known relevant medical history. RISK ASSESSMENT: 5 Year Tyrer-Cuzick: 2.49% 10 Year Tyrer-Cuzick: 5.47% Lifetime Tyrer-Cuzick: 17.73%   TISSUE DENSITY: FGT: The breasts have  heterogeneous fibroglandular tissue. BPE: The background parenchymal enhancement is minimal.  INDICATION: Ivonne Mcneal is a 53 y.o. female presenting for Fast Breast MRI Screening.   FINDINGS: Bilateral There are no suspicious enhancing masses or suspicious areas of non-mass enhancement. There is no axillary or internal mammary adenopathy.      Impression:  No MRI findings of malignancy in either breast.  ASSESSMENT/BI-RADS CATEGORY:  Overall: 1 - Negative  RECOMMENDATION:      - Continue annual screening mammography.      - Fast Breast MRI in 1 year.      I reviewed the above imaging data.      Advance Care Planning/Advance Directives:  Discussed disease status and treatment goals with the patient.

## 2024-07-16 DIAGNOSIS — Z00.00 ENCOUNTER FOR PREVENTIVE HEALTH EXAMINATION: Primary | ICD-10-CM

## 2024-07-23 PROBLEM — N83.202 LEFT OVARIAN CYST: Status: ACTIVE | Noted: 2024-07-23

## 2024-07-23 NOTE — ASSESSMENT & PLAN NOTE
You have a cyst in your left ovary.  Fortunately this is unchanged compared to previous imaging studies.  It is suggestive of an endometrioma, which is a benign lesion.  I would recommend continued follow-up with your gynecologist.

## 2024-07-30 ENCOUNTER — APPOINTMENT (OUTPATIENT)
Dept: LAB | Facility: CLINIC | Age: 54
End: 2024-07-30

## 2024-07-30 ENCOUNTER — HOSPITAL ENCOUNTER (OUTPATIENT)
Dept: NON INVASIVE DIAGNOSTICS | Facility: CLINIC | Age: 54
Discharge: HOME | End: 2024-07-30

## 2024-07-30 ENCOUNTER — HOSPITAL ENCOUNTER (OUTPATIENT)
Dept: ULTRASOUND IMAGING | Facility: HOSPITAL | Age: 54
Discharge: HOME | End: 2024-07-30

## 2024-07-30 ENCOUNTER — OFFICE VISIT (OUTPATIENT)
Dept: FAMILY MEDICINE CLINIC | Facility: CLINIC | Age: 54
End: 2024-07-30

## 2024-07-30 ENCOUNTER — HOSPITAL ENCOUNTER (OUTPATIENT)
Dept: VASCULAR ULTRASOUND | Facility: HOSPITAL | Age: 54
Discharge: HOME | End: 2024-07-30

## 2024-07-30 VITALS
RESPIRATION RATE: 14 BRPM | WEIGHT: 139 LBS | DIASTOLIC BLOOD PRESSURE: 72 MMHG | OXYGEN SATURATION: 98 % | HEART RATE: 70 BPM | BODY MASS INDEX: 24.63 KG/M2 | HEIGHT: 63 IN | SYSTOLIC BLOOD PRESSURE: 118 MMHG

## 2024-07-30 VITALS
SYSTOLIC BLOOD PRESSURE: 118 MMHG | HEART RATE: 60 BPM | DIASTOLIC BLOOD PRESSURE: 72 MMHG | WEIGHT: 138.89 LBS | BODY MASS INDEX: 24.61 KG/M2 | HEIGHT: 63 IN

## 2024-07-30 DIAGNOSIS — I65.21 CAROTID STENOSIS, ASYMPTOMATIC, RIGHT: ICD-10-CM

## 2024-07-30 DIAGNOSIS — Z00.00 ENCOUNTER FOR PREVENTIVE HEALTH EXAMINATION: ICD-10-CM

## 2024-07-30 DIAGNOSIS — E78.00 PURE HYPERCHOLESTEROLEMIA: ICD-10-CM

## 2024-07-30 DIAGNOSIS — E66.3 SEVERELY OVERWEIGHT: ICD-10-CM

## 2024-07-30 DIAGNOSIS — N83.202 LEFT OVARIAN CYST: ICD-10-CM

## 2024-07-30 DIAGNOSIS — Z80.3 FAMILY HISTORY OF BREAST CANCER IN FEMALE: ICD-10-CM

## 2024-07-30 DIAGNOSIS — F41.9 ANXIETY: ICD-10-CM

## 2024-07-30 DIAGNOSIS — N95.8 POSTARTIFICIAL MENOPAUSAL SYNDROME: ICD-10-CM

## 2024-07-30 DIAGNOSIS — K58.1 IRRITABLE BOWEL SYNDROME WITH CONSTIPATION: ICD-10-CM

## 2024-07-30 DIAGNOSIS — L40.9 PSORIASIS: ICD-10-CM

## 2024-07-30 DIAGNOSIS — L40.8 OTHER PSORIASIS: ICD-10-CM

## 2024-07-30 DIAGNOSIS — E78.2 MIXED HYPERLIPIDEMIA: Primary | ICD-10-CM

## 2024-07-30 DIAGNOSIS — R51.9 NONINTRACTABLE HEADACHE, UNSPECIFIED CHRONICITY PATTERN, UNSPECIFIED HEADACHE TYPE: ICD-10-CM

## 2024-07-30 DIAGNOSIS — R73.03 DIABETES MELLITUS, LATENT: ICD-10-CM

## 2024-07-30 PROBLEM — J30.9 ALLERGIC RHINITIS: Status: RESOLVED | Noted: 2022-04-26 | Resolved: 2024-07-30

## 2024-07-30 LAB
25(OH)D3 SERPL-MCNC: 52.3 NG/ML (ref 30–100)
ALBUMIN SERPL BCG-MCNC: 4.6 G/DL (ref 3.5–5)
ALP SERPL-CCNC: 54 U/L (ref 34–104)
ALT SERPL W P-5'-P-CCNC: 18 U/L (ref 7–52)
ANION GAP SERPL CALCULATED.3IONS-SCNC: 6 MMOL/L (ref 4–13)
AORTIC ROOT: 2.8 CM
APICAL FOUR CHAMBER EJECTION FRACTION: 73 %
ASCENDING AORTA: 3.1 CM
AST SERPL W P-5'-P-CCNC: 16 U/L (ref 13–39)
ATRIAL RATE: 67 BPM
BACTERIA UR QL AUTO: ABNORMAL /HPF
BASOPHILS # BLD AUTO: 0.04 THOUSANDS/ÂΜL (ref 0–0.1)
BASOPHILS NFR BLD AUTO: 1 % (ref 0–1)
BILIRUB SERPL-MCNC: 0.51 MG/DL (ref 0.2–1)
BILIRUB UR QL STRIP: NEGATIVE
BSA FOR ECHO PROCEDURE: 1.66 M2
BUN SERPL-MCNC: 18 MG/DL (ref 5–25)
CALCIUM SERPL-MCNC: 10 MG/DL (ref 8.4–10.2)
CHEST PAIN STATEMENT: NORMAL
CHLORIDE SERPL-SCNC: 103 MMOL/L (ref 96–108)
CHOLEST SERPL-MCNC: 251 MG/DL
CLARITY UR: CLEAR
CO2 SERPL-SCNC: 30 MMOL/L (ref 21–32)
COLOR UR: ABNORMAL
CREAT SERPL-MCNC: 0.66 MG/DL (ref 0.6–1.3)
CRP SERPL HS-MCNC: 0.86 MG/L
E WAVE DECELERATION TIME: 160 MS
E/A RATIO: 1.43
EOSINOPHIL # BLD AUTO: 0.06 THOUSAND/ÂΜL (ref 0–0.61)
EOSINOPHIL NFR BLD AUTO: 1 % (ref 0–6)
ERYTHROCYTE [DISTWIDTH] IN BLOOD BY AUTOMATED COUNT: 12.6 % (ref 11.6–15.1)
EST. AVERAGE GLUCOSE BLD GHB EST-MCNC: 114 MG/DL
FRACTIONAL SHORTENING: 38 (ref 28–44)
GFR SERPL CREATININE-BSD FRML MDRD: 99 ML/MIN/1.73SQ M
GLUCOSE P FAST SERPL-MCNC: 98 MG/DL (ref 65–99)
GLUCOSE UR STRIP-MCNC: NEGATIVE MG/DL
HBA1C MFR BLD: 5.6 %
HCT VFR BLD AUTO: 41.5 % (ref 34.8–46.1)
HDLC SERPL-MCNC: 69 MG/DL
HGB BLD-MCNC: 13.6 G/DL (ref 11.5–15.4)
HGB UR QL STRIP.AUTO: NEGATIVE
IMM GRANULOCYTES # BLD AUTO: 0.01 THOUSAND/UL (ref 0–0.2)
IMM GRANULOCYTES NFR BLD AUTO: 0 % (ref 0–2)
INTERVENTRICULAR SEPTUM IN DIASTOLE (PARASTERNAL SHORT AXIS VIEW): 0.5 CM
INTERVENTRICULAR SEPTUM: 0.5 CM (ref 0.6–1.1)
KETONES UR STRIP-MCNC: NEGATIVE MG/DL
LAAS-AP2: 10.3 CM2
LAAS-AP4: 14.8 CM2
LDLC SERPL CALC-MCNC: 163 MG/DL (ref 0–100)
LEFT ATRIUM AREA SYSTOLE SINGLE PLANE A4C: 15.8 CM2
LEFT ATRIUM SIZE: 3.4 CM
LEFT ATRIUM VOLUME (MOD BIPLANE): 38 ML
LEFT ATRIUM VOLUME INDEX (MOD BIPLANE): 22.9 ML/M2
LEFT INTERNAL DIMENSION IN SYSTOLE: 2.9 CM (ref 2.1–4)
LEFT VENTRICULAR INTERNAL DIMENSION IN DIASTOLE: 4.7 CM (ref 3.5–6)
LEFT VENTRICULAR POSTERIOR WALL IN END DIASTOLE: 0.5 CM
LEFT VENTRICULAR STROKE VOLUME: 68 ML
LEUKOCYTE ESTERASE UR QL STRIP: ABNORMAL
LVSV (TEICH): 68 ML
LYMPHOCYTES # BLD AUTO: 1.13 THOUSANDS/ÂΜL (ref 0.6–4.47)
LYMPHOCYTES NFR BLD AUTO: 25 % (ref 14–44)
MAX DIASTOLIC BP: 72 MMHG
MAX HR PERCENT: 98 %
MAX HR: 162 BPM
MAX PREDICTED HEART RATE: 165 BPM
MCH RBC QN AUTO: 31 PG (ref 26.8–34.3)
MCHC RBC AUTO-ENTMCNC: 32.8 G/DL (ref 31.4–37.4)
MCV RBC AUTO: 95 FL (ref 82–98)
MONOCYTES # BLD AUTO: 0.43 THOUSAND/ÂΜL (ref 0.17–1.22)
MONOCYTES NFR BLD AUTO: 9 % (ref 4–12)
MV E'TISSUE VEL-SEP: 10 CM/S
MV PEAK A VEL: 0.53 M/S
MV PEAK E VEL: 76 CM/S
MV STENOSIS PRESSURE HALF TIME: 46 MS
MV VALVE AREA P 1/2 METHOD: 4.8
NEUTROPHILS # BLD AUTO: 2.94 THOUSANDS/ÂΜL (ref 1.85–7.62)
NEUTS SEG NFR BLD AUTO: 64 % (ref 43–75)
NITRITE UR QL STRIP: NEGATIVE
NON-SQ EPI CELLS URNS QL MICRO: ABNORMAL /HPF
NRBC BLD AUTO-RTO: 0 /100 WBCS
P AXIS: 16 DEGREES
PH UR STRIP.AUTO: 7 [PH]
PLATELET # BLD AUTO: 280 THOUSANDS/UL (ref 149–390)
PMV BLD AUTO: 10 FL (ref 8.9–12.7)
POST LVEF: 70 %
POTASSIUM SERPL-SCNC: 4.3 MMOL/L (ref 3.5–5.3)
PR INTERVAL: 134 MS
PROT SERPL-MCNC: 6.8 G/DL (ref 6.4–8.4)
PROT UR STRIP-MCNC: NEGATIVE MG/DL
PROTOCOL NAME: NORMAL
QRS AXIS: 74 DEGREES
QRSD INTERVAL: 80 MS
QT INTERVAL: 410 MS
QTC INTERVAL: 433 MS
RATE PRESSURE PRODUCT: NORMAL
RBC # BLD AUTO: 4.39 MILLION/UL (ref 3.81–5.12)
RBC #/AREA URNS AUTO: ABNORMAL /HPF
RIGHT ATRIUM AREA SYSTOLE A4C: 12.3 CM2
RIGHT VENTRICLE ID DIMENSION: 2.8 CM
SL CV LEFT ATRIUM LENGTH A2C: 3.2 CM
SL CV LV EF: 60
SL CV LV EF: 60
SL CV PED ECHO LEFT VENTRICLE DIASTOLIC VOLUME (MOD BIPLANE) 2D: 100 ML
SL CV PED ECHO LEFT VENTRICLE SYSTOLIC VOLUME (MOD BIPLANE) 2D: 32 ML
SL CV STRESS RECOVERY BP: NORMAL MMHG
SL CV STRESS RECOVERY HR: 95 BPM
SL CV STRESS RECOVERY O2 SAT: 99 %
SL CV STRESS STAGE REACHED: 5
SODIUM SERPL-SCNC: 139 MMOL/L (ref 135–147)
SP GR UR STRIP.AUTO: 1 (ref 1–1.03)
STRESS ANGINA INDEX: 0
STRESS BASELINE BP: NORMAL MMHG
STRESS BASELINE HR: 65 BPM
STRESS O2 SAT REST: 99 %
STRESS PEAK HR: 162 BPM
STRESS POST ESTIMATED WORKLOAD: 14.5 METS
STRESS POST EXERCISE DUR MIN: 8 MIN
STRESS POST EXERCISE DUR MIN: 8 MIN
STRESS POST EXERCISE DUR SEC: 45 SEC
STRESS POST EXERCISE DUR SEC: 45 SEC
STRESS POST O2 SAT PEAK: 98 %
STRESS POST PEAK BP: 156 MMHG
STRESS POST PEAK HR: 162 BPM
STRESS POST PEAK SYSTOLIC BP: 156 MMHG
T WAVE AXIS: 45 DEGREES
TARGET HR FORMULA: NORMAL
TEST INDICATION: NORMAL
TESTOST SERPL-MSCNC: 27 NG/DL
TR MAX PG: 10 MMHG
TR PEAK VELOCITY: 1.6 M/S
TRICUSPID ANNULAR PLANE SYSTOLIC EXCURSION: 2.3 CM
TRICUSPID VALVE PEAK REGURGITATION VELOCITY: 1.57 M/S
TRIGL SERPL-MCNC: 95 MG/DL
TSH SERPL DL<=0.05 MIU/L-ACNC: 1.62 UIU/ML (ref 0.45–4.5)
UROBILINOGEN UR STRIP-ACNC: <2 MG/DL
VENTRICULAR RATE: 67 BPM
WBC # BLD AUTO: 4.61 THOUSAND/UL (ref 4.31–10.16)
WBC #/AREA URNS AUTO: ABNORMAL /HPF

## 2024-07-30 PROCEDURE — 99499EX: Performed by: FAMILY MEDICINE

## 2024-07-30 PROCEDURE — 36415 COLL VENOUS BLD VENIPUNCTURE: CPT

## 2024-07-30 PROCEDURE — 85025 COMPLETE CBC W/AUTO DIFF WBC: CPT

## 2024-07-30 PROCEDURE — 82627 DEHYDROEPIANDROSTERONE: CPT

## 2024-07-30 PROCEDURE — 93306 TTE W/DOPPLER COMPLETE: CPT | Performed by: INTERNAL MEDICINE

## 2024-07-30 PROCEDURE — 93306 TTE W/DOPPLER COMPLETE: CPT

## 2024-07-30 PROCEDURE — 76856 US EXAM PELVIC COMPLETE: CPT

## 2024-07-30 PROCEDURE — 93350 STRESS TTE ONLY: CPT | Performed by: INTERNAL MEDICINE

## 2024-07-30 PROCEDURE — 82306 VITAMIN D 25 HYDROXY: CPT

## 2024-07-30 PROCEDURE — 93350 STRESS TTE ONLY: CPT

## 2024-07-30 PROCEDURE — 76700 US EXAM ABDOM COMPLETE: CPT

## 2024-07-30 PROCEDURE — 93010 ELECTROCARDIOGRAM REPORT: CPT | Performed by: INTERNAL MEDICINE

## 2024-07-30 PROCEDURE — 81001 URINALYSIS AUTO W/SCOPE: CPT

## 2024-07-30 PROCEDURE — VASC: Performed by: SURGERY

## 2024-07-30 PROCEDURE — 84403 ASSAY OF TOTAL TESTOSTERONE: CPT

## 2024-07-30 PROCEDURE — 93922 UPR/L XTREMITY ART 2 LEVELS: CPT

## 2024-07-30 PROCEDURE — 80053 COMPREHEN METABOLIC PANEL: CPT

## 2024-07-30 PROCEDURE — 80061 LIPID PANEL: CPT

## 2024-07-30 PROCEDURE — 93005 ELECTROCARDIOGRAM TRACING: CPT

## 2024-07-30 PROCEDURE — 76830 TRANSVAGINAL US NON-OB: CPT

## 2024-07-30 PROCEDURE — 84443 ASSAY THYROID STIM HORMONE: CPT

## 2024-07-30 PROCEDURE — 83036 HEMOGLOBIN GLYCOSYLATED A1C: CPT

## 2024-07-30 PROCEDURE — 86141 C-REACTIVE PROTEIN HS: CPT

## 2024-07-30 RX ORDER — CLOBETASOL PROPIONATE 0.5 MG/ML
SOLUTION TOPICAL
Qty: 50 ML | Refills: 4 | Status: SHIPPED | OUTPATIENT
Start: 2024-07-30

## 2024-07-30 NOTE — ASSESSMENT & PLAN NOTE
You have a small amount of plaque in your right carotid artery.  Fortunately this is not interfering with your blood supply.  I would recommend continuing to work on low-fat diet and regular exercise.  We should repeat another carotid ultrasound again in 1 year.

## 2024-07-30 NOTE — PROGRESS NOTES
"  Your [facility] Board-Certified Dermatologist's Name: Yesenia Barber MD    Skin Screening Exam:    A chaperone was present throughout the entire encounter.      SKIN:  FULL ORGAN SYSTEM EXAM   Hair, Scalp, Ears, Face Normal except as noted below in Assessment   Neck, Cervical Chain Nodes Normal except as noted below in Assessment   Right Arm/Hand/Fingers Normal except as noted below in Assessment   Left Arm/Hand/Fingers Normal except as noted below in Assessment   Chest/Breasts/Axillae Did the patient specifically refuse to have the areas \"under-the-bra\" examined by the Dermatologist? No  Examined areas normal except as noted below in Assessment   Abdomen, Umbilicus Normal except as noted below in Assessment   Back/Spine Normal except as noted below in Assessment   Groin/Genitalia/Buttocks Did the patient specifically refuse to have the areas \"under-the-underwear\" examined by the Dermatologist? No  Examined areas normal except as noted below in Assessment   Right Leg, Foot, Toes Normal except as noted below in Assessment   Left Leg, Foot, Toes Normal except as noted below in Assessment        Assessment and Plan by Diagnosis:    Use a moisturizer + sunscreen \"combo\" product such as Neutrogena Daily Defense SPF 50+ or CeraVe AM at least three times a day.  Follow-up with your private dermatologist or one of our board-certified [facility] Dermatologists as discussed.  [facility] Dermatology's own Dr. Rula Riggins is available for consultation for skin enhancement and revitalization services; please mention \"[facility]\" for expedited scheduling  SEBORRHEIC KERATOSES  - Relevant exam: Scattered over the trunk/extremities are waxy brown to black plaques and papules with stuck on appearance and consistent dermoscopy  - Exam and clinical history consistent with seborrheic keratoses  - Counseled that these are benign growths that do not require treatment    MELANOCYTIC NEVI  -Relevant exam: Scattered over the " trunk/extremities are homogenously pigmented brown macules and papules. ELM performed and without concerning findings. No outliers unless otherwise noted in today's note  - Exam and clinical history consistent with melanocytic nevi  - Counseled to return to clinic prior to scheduled appointment should any of these lesions change or should any new lesions of concern arise  - Counseled on use of sun protection daily. Reviewed latest FDA sunscreen guidelines, including use of broad spectrum (UVA and UVB blocking) sunscreen or sun protective clothing with SPF 30-50 every 2-3 hours and reapplied after exposure to water    LENTIGINES  OTHER SKIN CHANGES DUE TO CHRONIC EXPOSURE TO NONIONIZING RADIATION  - Relevant exam: Over sun exposed areas are brown macules. ELM performed and without concerning findings.  - Exam and clinical history consistent with lentigines.  - Counseled to return to clinic prior to scheduled appointment should any of these lesions change or should any new lesions of concern arise.  - Recommended use of sunscreen as above and below.    CHERRY ANGIOMAS  - Relevant exam: Scattered over the trunk/extremities are red papules  - Exam and clinical history consistent with cherry angiomas  - Educated that these are benign        PSORIASIS    Physical Exam:  Anatomic Location Affected:  scalp  Morphological Description:  erythematous scaling plaques  Severity: mild  Body Percent Affected: 2      Additional History of Present Condition:  Patient with history of scalp psoriasis. Recently flaring with increased stress. She uses clobetasol prn. Not interested in systemic therapy at this time. Followed by dermatologist outside of [facility].      Assessment and Plan:        SCALP  Refill of clobetasol scalp solution: Apply BID for up to 2 weeks to affected skin on scalp prn flares then take one week break and can repeat regimen prn flares. Do not apply to groin, skin folds, face.  If patient desires escalation of  therapy in the future, can follow up with us or her outside dermatologist

## 2024-07-30 NOTE — ASSESSMENT & PLAN NOTE
You have a history of psoriasis, predominantly on your scalp.  Our dermatologist today issued you a refill on your clobetasol solution.  You can use this up to twice a day for up to 2 weeks at a time.  I would recommend continued regular follow-up with your dermatologist as directed.

## 2024-07-30 NOTE — ASSESSMENT & PLAN NOTE
You have history of IBS (constipation dominant) you had an EGD and colonoscopy in 2019.  I would recommend continued regular follow-up with your gastroenterologist as directed.

## 2024-07-30 NOTE — PROGRESS NOTES
Fitness Summary and Recommendations: Ivonne scored at the 40% on her body composition assessment with a bodyfat % of 31.3%. Ivonne scored in the excellent range for flexibility with a sit & reach score of 36 cm. Her Muscle Strength/Endurance scores placed her at the 95% (lower body) and 95% (upper body) with a chair stand score of 29 and arm curl score of 30 respectively. Ivonne's Cardiovascular Score of 60.3 placed her at the 95%. Overall, Ivonne would be considered to have an excellent fitness level with a 82% score. Her overall fitness score has increased by 2% from her previous test on 06/01/23. Continued emphasis on regular exercise and sound nutrition practices will enable Ivonne to reach her optimal level of fitness.

## 2024-07-30 NOTE — ASSESSMENT & PLAN NOTE
Your cholesterol remains high at 251 (normal is less than 200).  Fortunately your good cholesterol (HDL) remains high at 69.  This helps to protect against the effects of bad cholesterol.  Also your cardiovascular risk score (ASCVD) is low at 1.7%.  Also all of your cardiac testing was negative today.  We are somewhat concerned that your cholesterol has been increasing lately.  That being said.  I think it is reasonable to continue to work on reducing animal-based and dairy fats in your diet,  along with regular exercise program (at least 150 minutes of aerobic exercise weekly).  I would consider repeating your lipids again in 6 months.  If they are not improving, you may want to consider initiation of low-dose statin therapy.

## 2024-07-30 NOTE — PROGRESS NOTES
[facility] Physical Exam     Ivonne Mcneal is a 53 y.o. female who is presenting for her 3rd [facility] Physical Exam at [facility]. Ivonne is the  of [employer]. She resides in [place], [place].    Ivonne is past medical history is significant for hyperlipidemia, allergic rhinitis, psoriasis, headaches, anxiety, and a left ovarian cyst (for which she is being followed by her gynecologist).  Her medications include alprazolam as needed, butalbital as needed.  She also takes a number of OTC supplements.    Her past surgical history is significant for breast reduction surgery in 1991, cystoscopy in 2005, lipoma excision of back in 2010 and 2012, right knee arthroscopy in 2014, LASIK surgery in 2015, and partial hysterectomy in 2017.    Ivonne is family history is significant for hyperlipidemia and psoriasis (father), and breast cancer on mother side.    She is a non-smoker. She drinks approximately 4-5 alcoholic beverages per week (mostly on the weekends).  She consumes 2 cups of coffee per day.  Her diet is mostly healthy.  She has been making some modifications recently due to her IBS symptoms.  She exercises regularly; going to the gym 3 days a week.  She also walks 3 to 4 days/week.    Review of Systems   Constitutional:  Negative for chills and fever.   HENT:  Negative for ear pain and sore throat.    Eyes:  Negative for pain and visual disturbance.   Respiratory:  Negative for cough and shortness of breath.    Cardiovascular:  Negative for chest pain and palpitations.   Gastrointestinal:  Positive for constipation. Negative for abdominal pain and vomiting.   Genitourinary:  Negative for dysuria and hematuria.   Musculoskeletal:  Negative for arthralgias and back pain.   Skin:  Negative for color change and rash.   Neurological:  Negative for seizures and syncope.   All other systems reviewed and are negative.         Active Ambulatory Problems     Diagnosis Date Noted   • Family history of breast cancer in female    • Psoriasis 2022   • Mixed hyperlipidemia 2022   • Gastroesophageal reflux disease without esophagitis 2022   • Increased risk of breast cancer    • Dysfunction of eustachian tube 2024   • Left ovarian cyst 2024   • Irritable bowel syndrome with constipation 2024   • Anxiety 2024   • Nonintractable headache 2024   • Carotid stenosis, asymptomatic, right 2024     Resolved Ambulatory Problems     Diagnosis Date Noted   • Leiomyoma of uterus 10/03/2017   • Well adult exam 10/31/2018   • Allergic rhinitis 2022   • Mixed conductive and sensorineural hearing loss of right ear 2022   • Cerumen debris on tympanic membrane of left ear 2022   • Normal hearing exam 2024     Past Medical History:   Diagnosis Date   • Abdominal pain    • Abnormal weight gain    • Bloating    • Bloating    • Endometriosis    • Enlarged lymph node    • Female infertility    • Fibroid uterus    • GERD (gastroesophageal reflux disease)    • Gluten intolerance    •  1 para 1    • Headache    • Heartburn    • History of early menarche    • History of ovarian cyst    • Hx of migraines    • Inconclusive mammogram due to dense breasts    • Increased glucose level    • Interstitial cystitis    • Intolerance to milk products    • Intolerance to milk products    • Irregular menstrual cycle    • Lactose intolerance    • Leiomyoma of uterus, unspecified    • Lichen sclerosus et atrophicus    • Lichen sclerosus et atrophicus    • Lichen simplex chronicus    • Lichen simplex chronicus    • Lichen simplex chronicus    • Migraine    • Migraine    • Mild heartburn    • Ovarian cyst    • Pelvic pain in female    • PONV (postoperative nausea and vomiting)    • RLQ abdominal pain    • Simple ovarian cyst    • Urinary tract infection    • Wears glasses    • Yeast infection         Past Surgical History:   Procedure Laterality Date   • BREAST BIOPSY Left 11/20/2009    OPEN   • BREAST BIOPSY  01/06/2009    NEEDLE CORE   • BREAST SURGERY Bilateral     Breast reduction   • BUNIONECTOMY Right     5th toe right foot   • CYSTOSCOPY N/A 10/03/2017    Procedure: CYSTOSCOPY;  Surgeon: Virginie Cuba DO;  Location: [facility];  Service: Gynecology   • HYSTERECTOMY      supracervical   • KNEE ARTHROSCOPY Right     Right outer meniscus   • KNEE SURGERY Right     last assessed 12/15/14   • LASIK     • LASIK     • LIPOMA RESECTION      x2 on back   • VA LAPS SUPRACRV HYSTERECT 250 GM/< RMVL TUBE/OVAR N/A 10/03/2017    Procedure: HYSTERECTOMY LAPAROSCOPIC SUPRACERVICAL (LSH);  Surgeon: Virginie Cuba DO;  Location: [facility];  Service: Gynecology   • REDUCTION MAMMAPLASTY Bilateral     1991   • SALPINGECTOMY Bilateral 10/03/2017    Procedure: SALPINGECTOMY;  Surgeon: Virginie Cuba DO;  Location: [facility];  Service: Gynecology   • SALPINGECTOMY Left     Unilateral , last assessed 10/19/17   • WISDOM TOOTH EXTRACTION         Family History   Problem Relation Age of Onset   • Breast cancer Mother 67   • Other Mother         Hypoglycemia   • Cancer Mother         Breast cancer   • Hyperlipidemia Mother    • Hyperlipidemia Father    • Rashes / Skin problems Father         Psoriasis   • Depression Sister    • Lung cancer Maternal Grandmother 86   • Breast cancer Maternal Grandmother 75   • Cancer Maternal Grandmother         Breast Cancer   • Prostate cancer Maternal Grandfather 60   • Cancer Maternal Grandfather         Prostate Cancer   • Breast cancer Paternal Grandmother 55   • Cancer Paternal Grandmother         Breast Cancer   • No Known Problems Paternal Grandfather    • Prostate cancer Maternal Uncle 55   • Breast cancer Cousin 35        second cousin (maternal) stage 4 at diagnosis. Genetics negative   • Breast cancer Cousin 32        second cousin (maternal). Genetic testing negative   • Ovarian  "cancer Cousin         sister to Adrianamadyson prior to age 40   • Cancer Maternal Uncle         Prostate cancer       Social History     Tobacco Use   Smoking Status Never   Smokeless Tobacco Never         Current Outpatient Medications:   •  clobetasol (TEMOVATE) 0.05 % external solution, Apply BID for up to 2 weeks to affected skin on scalp prn flares then take one week break and can repeat regimen prn flares. Do not apply to groin, skin folds, face., Disp: 50 mL, Rfl: 4  •  ALPRAZolam (XANAX) 0.25 mg tablet, Xanax, Disp: , Rfl:   •  Ascorbic Acid (VITAMIN C) 1000 MG tablet, Take 1,000 mg by mouth daily, Disp: , Rfl:   •  butalbital-acetaminophen-caffeine (FIORICET,ESGIC) -40 mg per tablet, butalbital-acetaminophen-caffeine 50 mg-325 mg-40 mg tablet, Disp: , Rfl:   •  calcium carbonate (OS-FREDI) 600 MG tablet, Take 600 mg by mouth in the morning. With magnesium ., Disp: , Rfl:   •  clobetasol (TEMOVATE) 0.05 % external solution, Apply topically to the scalp twice a day for 2 weeks as needed for Psoriasis flares, Disp: 50 mL, Rfl: 3  •  co-enzyme Q-10 30 MG capsule, Take 30 mg by mouth 3 (three) times a day, Disp: , Rfl:   •  Melatonin 1 MG CAPS, Take 3 mg by mouth, Disp: , Rfl:   •  Multiple Vitamin (MULTIVITAMIN) capsule, Take 1 capsule by mouth daily (Patient not taking: Reported on 6/19/2023), Disp: , Rfl:   •  Multiple Vitamins-Minerals (MULTIVITAMIN ADULT EXTRA C PO), multivitamin, Disp: , Rfl:   •  NON FORMULARY, Compounded Bi-Estrogen, Disp: , Rfl:   •  Progesterone 200 MG CAPS, Take 1 tablet by mouth in the morning, Disp: , Rfl:     Allergies   Allergen Reactions   • Nickel Hives and Rash     From plated jewelry or any sort   • Percocet [Oxycodone-Acetaminophen] Other (See Comments)     Jittery and teeth chatter   • Sulfa Antibiotics GI Intolerance and Hives     Severe nausea  \"Nausea \T\ constipation\"   • Wound Dressing Adhesive Rash   • Gluten Meal - Food Allergy GI Intolerance     Negative for celiac " "disease   • Lactose - Food Allergy GI Intolerance   • Latex      Patient unsure if it was surgical glue or steristrips that were used on knee after surgery that redness and itching. States her surgeon told her to tell people she has a Latex allergy   • Septra [Sulfamethoxazole-Trimethoprim]          Objective:    Vitals:    07/30/24 0804   BP: 118/72   Pulse: 70   Resp: 14   SpO2: 98%   Weight: 63 kg (139 lb)   Height: 5' 3\" (1.6 m)        Physical Exam  Vitals and nursing note reviewed.   Constitutional:       Appearance: She is well-developed.   HENT:      Head: Normocephalic and atraumatic.      Right Ear: External ear normal.      Left Ear: External ear normal.   Eyes:      Pupils: Pupils are equal, round, and reactive to light.   Cardiovascular:      Rate and Rhythm: Normal rate and regular rhythm.      Heart sounds: Normal heart sounds.   Pulmonary:      Effort: Pulmonary effort is normal.      Breath sounds: Normal breath sounds.   Abdominal:      General: Bowel sounds are normal.      Palpations: Abdomen is soft.   Musculoskeletal:      Cervical back: Normal range of motion and neck supple.   Neurological:      Mental Status: She is alert and oriented to person, place, and time.      Deep Tendon Reflexes: Reflexes are normal and symmetric.   Psychiatric:         Behavior: Behavior normal.         Thought Content: Thought content normal.         Judgment: Judgment normal.             Assessment/Plan:     1. Mixed hyperlipidemia  Assessment & Plan:  Your cholesterol remains high at 251 (normal is less than 200).  Fortunately your good cholesterol (HDL) remains high at 69.  This helps to protect against the effects of bad cholesterol.  Also your cardiovascular risk score (ASCVD) is low at 1.7%.  Also all of your cardiac testing was negative today.  We are somewhat concerned that your cholesterol has been increasing lately.  That being said.  I think it is reasonable to continue to work on reducing animal-based " and dairy fats in your diet,  along with regular exercise program (at least 150 minutes of aerobic exercise weekly).  I would consider repeating your lipids again in 6 months.  If they are not improving, you may want to consider initiation of low-dose statin therapy.  2. Psoriasis  Assessment & Plan:  You have a history of psoriasis, predominantly on your scalp.  Our dermatologist today issued you a refill on your clobetasol solution.  You can use this up to twice a day for up to 2 weeks at a time.  I would recommend continued regular follow-up with your dermatologist as directed.  Orders:  -     clobetasol (TEMOVATE) 0.05 % external solution; Apply BID for up to 2 weeks to affected skin on scalp prn flares then take one week break and can repeat regimen prn flares. Do not apply to groin, skin folds, face.  3. Family history of breast cancer in female  Assessment & Plan:  You have history of breast cancer.  Fortunately your recent genetic testing did not reveal any actionable results.  I would recommend continued regular follow-up with your breast care specialist as directed.  4. Left ovarian cyst  Assessment & Plan:  You have a cyst in your left ovary.  Fortunately this is unchanged compared to previous imaging studies.  It is suggestive of an endometrioma, which is a benign lesion.  I would recommend continued follow-up with your gynecologist.  5. Irritable bowel syndrome with constipation  Assessment & Plan:  You have history of IBS (constipation dominant) you had an EGD and colonoscopy in 2019.  I would recommend continued regular follow-up with your gastroenterologist as directed.  6. Anxiety  Assessment & Plan:  It appears you are doing well with the rare/occasional use of alprazolam 0.25 mg tablets for situational anxiety.  I think it is reasonable to continue this treatment.  7. Nonintractable headache, unspecified chronicity pattern, unspecified headache type  Assessment & Plan:  It appears you are doing  with occasional use of butalbital tablets.  I think it is reasonable to continue this treatment strategy.  8. Carotid stenosis, asymptomatic, right  Assessment & Plan:  You have a small amount of plaque in your right carotid artery.  Fortunately this is not interfering with your blood supply.  I would recommend continuing to work on low-fat diet and regular exercise.  We should repeat another carotid ultrasound again in 1 year.       [facility] Summary:     Overall, I think you are in pretty good health today.  Your skin exam today did not reveal any significant abnormalities. Our dermatologist has refilled your prescription for clobetasol solution for you to use on your scalp psoriasis.  I would recommend continued annual checkups with a dermatologist.  Your cardiology testing was negative (including EKG, stress test, and echocardiogram).     (Also please refer to individually listed diagnoses, cardiology, dermatology audiology, fitness, and nutrition reports for more information).    Your last colonoscopy was in April 2019. This was reported as normal. You should have this repeated in 2029.   You are current with breast cancer screening. Due to your family history of breast cancer, I would recommend continuing regular follow up with your breast care specialist.     I reviewed your vaccination history.  I am glad you get a flu shot every fall.  Your last tetanus shot was in 2016.  I would recommend getting another booster by 2026.  I would also consider getting the shingles vaccine (Shingrix).  This is a series of 2 shots given the approximately 3 months apart.  It is covered by most insurances.  You can get this done at your local pharmacy or PCP office.      Lastly, I would recommend continuing a healthy diet (including vitamin D 2000 international units daily and calcium 1200 mg daily).  I would also recommend continuing your regular exercise program (at least 150 minutes of aerobic exercise weekly).        Ivonne,     Thank you for choosing [facility].  It was a pleasure seeing you again today.  If you have any questions regarding today's exam, feel free to contact me. We hope to see you again in the future.     Jono Simpson ([facility] Lead Physician)  [phone] (cell)  [email or web address]

## 2024-07-30 NOTE — PROGRESS NOTES
Heart and Vascular Summary:    Baseline ECG: Normal sinus rhythm, normal ECG.        Echocardiogram: Normal right and left ventricular size and function.  Normal valvular structure and function.  This is a normal echocardiogram.      Stress Echocardiogram: Excellent exercise capacity (8 mins 45 sec) on the accelerated protocol, achieving 14.5 METS, and 98% of maximal predicted heart rate.  You had no changes on the ECG portion to suggest ischemia or blockage.  Blood pressure was normal at the start of the test, with a normal response to exercise (peak blood pressure of 148 systolic).  You had a good heart rate recovery after exercise.  Normal baseline left and right ventricular wall motion and function on echocardiogram with no wall motion abnormalities seen at peak stress to suggest any significant blockages in your coronary arteries over 50% that may require revascularization.         Lipid Profile: this revealed a total cholesterol of 251, LDL of 163, HDL of 69, and a Triglyceride level of 95. The total cholesterol is a sum of its individual parts.  The HDL is the good cholesterol, which is protective to your heart.  Your level is above your goal of over 40.  The only way to raise this level is with increased activity/exercise.  The Triglycerides are a marker of your diet and genetics.  Less than 150 is what we aim for, and your level is within range.  Reducing the simple sugars and carbohydrates in your diet will help reduce this value.  The LDL is the bad cholesterol, the cholesterol that builds atherosclerotic plaque in your arteries.  Your level is much higher than your goal of less than 120.  Reducing the trans-fats and saturated fats in your diet along with increasing exercise can help improve this value.  A statin medication can also help lower this value.

## 2024-07-30 NOTE — PROGRESS NOTES
"Nutritional Summary and Recommendations:    Patient Nutrition-Oriented Medical/Diet Hx  The patient presents today to [facility] for nutrition counseling & assessment.  This is Ivonne's third encounter with [facility].  Since last year she is still following a gluten free and lactose free diet, trying to limit high FODMAP foods.  Ivonne is still noting constipation with occasional diarrhea but also some related abdominal pain.  She is still working on increasing fluids, often forgets. Has been trying to make decaf unsweet tea. Suggested trying freezing 10% juice into icecube trays to put into water.  Pt is also sitting a lot during the day, discussed how increased blood flow via movement can sometimes help increase GI mobility.  Ivonne also still working on stress reductions, reviewed connection between stress & IBS, she does notice an increase in GI symptoms with increased stress. Reviewed soluble vs insoluble fiber, suggested seeing if increased insoluble fiber can be helpful.  Will email Ivonne additional information related to what was discussed today.      Nutrition Related Medications/Supplements:  Progesterone 225 mg 1x/d, Biestrogrogen 125 mg 1x/d, Multivitamin, Omega 3, CoQ10 100 mg, B-complex 50 mg, Vitamin C 4000 mg, Calcium 1000 mg, Magnesium 500 mg     Labs:  A1C 5.6  Total Cholesterol 251  Triglycerides 95  HDL 69    Vitamin D 52.3      Anthropometrics:     Ht: 5'3\"  Wt: 137.6#   BMI: 24.4     BMR: 1293 kcal Fat%: 31.3   Acceptable Range: 23-34%     Fat Mass: 43#  FFM: 94.6 # TBW: 69.2 #         Nutrition Diagnosis:     Altered GI function r/t IBS with SIBO as evidenced by pt interview, constipation with diarrhea.        Nutrition Recommendations:    Increase fluids to 64+oz  Work 1-2 activity sessions (discussed squats) into workday  Contact registered dietitian with any questions or concerns.     Nutrition Education     Increased hydration & daytime activity, different " types of fiber->aim to reduce GI symptoms.      Perceived Comprehension:  Very good     Expected Compliance: Very good

## 2024-07-30 NOTE — ASSESSMENT & PLAN NOTE
You have history of breast cancer.  Fortunately your recent genetic testing did not reveal any actionable results.  I would recommend continued regular follow-up with your breast care specialist as directed.

## 2024-07-30 NOTE — ASSESSMENT & PLAN NOTE
It appears you are doing well with the rare/occasional use of alprazolam 0.25 mg tablets for situational anxiety.  I think it is reasonable to continue this treatment.

## 2024-07-30 NOTE — PROGRESS NOTES
Hearing Assessment Summary:   Hearing Screening    250Hz 500Hz 1000Hz 2000Hz 3000Hz 4000Hz 6000Hz 8000Hz   Right ear 20 20 15 25 25 25 30 25   Left ear 15 15 15 25 30 30 30 30   Comments: HEARING EVALUATION    Name:  Ivonne Mcneal  :  1970  Age:  53 y.o.   MRN:  [id number]  Date of Evaluation: 24     History: [facility] Exam  Reason for visit: Ivonne Mcneal is being seen today for an evaluation of hearing as part of an [employer] examination.  Patient is followed by Dr Moulton. Last visit with his office revealed normal/borderline normal hearing bilaterally. Patient previously reported near syncope events, but no recent episodes were reported. History of non-lateralizing static sounding tinnitus were previously reported, but today patient reports that she only notices this when having her hearing tested.       EVALUATION:    Otoscopic Evaluation:   Right Ear: Clear and healthy ear canal and tympanic membrane   Left Ear: Clear and healthy ear canal and tympanic membrane    Tympanometry:   Right: Type A - normal middle ear pressure and compliance   Left: Type A - normal middle ear pressure and compliance    NOTE: Patient reported very brief light headed feeling (not reported as vertigo) with tympanometry in right ear today.     Audiogram Results:  Normal peripheral hearing sensitivity through 1k Hz with borderline normal hearing/mild hearing loss thereafter.    Stable from last evaluation at this center in  and stable to last evaluation with Dr. Moulton's office 2024.    *see attached audiogram      RECOMMENDATIONS:  Annual hearing eval, Consult ENT, Return to [facility]. for F/U, and Copy to Patient/Caregiver    PATIENT EDUCATION:   Discussed results and recommendations with patient.  Patient to contact Dr. Moulton to notify him of  brief lightheadedness during right sided tympanometry today. Questions were addressed and the patient was encouraged to contact our department should concerns  arise.      Ovidio Kemp  Clinical Audiologist'      Vision Screening    Right eye Left eye Both eyes   Without correction      With correction 20/25 20/20 20/20

## 2024-07-30 NOTE — ASSESSMENT & PLAN NOTE
It appears you are doing with occasional use of butalbital tablets.  I think it is reasonable to continue this treatment strategy.

## 2024-07-31 LAB — DHEA-S SERPL-MCNC: 101 UG/DL (ref 29.4–220.5)

## 2024-10-04 ENCOUNTER — HOSPITAL ENCOUNTER (OUTPATIENT)
Dept: ULTRASOUND IMAGING | Facility: HOSPITAL | Age: 54
Discharge: HOME | End: 2024-10-04
Payer: COMMERCIAL

## 2024-10-04 DIAGNOSIS — E07.9 DISORDER OF THYROID, UNSPECIFIED: ICD-10-CM

## 2024-10-04 DIAGNOSIS — R73.03 PREDIABETES: ICD-10-CM

## 2024-10-04 DIAGNOSIS — K21.9 GASTRO-ESOPHAGEAL REFLUX DISEASE WITHOUT ESOPHAGITIS: ICD-10-CM

## 2024-10-04 DIAGNOSIS — L40.8 OTHER PSORIASIS: ICD-10-CM

## 2024-10-04 DIAGNOSIS — E66.3 OVERWEIGHT: ICD-10-CM

## 2024-10-04 DIAGNOSIS — N95.8 OTHER SPECIFIED MENOPAUSAL AND PERIMENOPAUSAL DISORDERS: ICD-10-CM

## 2024-10-04 DIAGNOSIS — E78.00 PURE HYPERCHOLESTEROLEMIA, UNSPECIFIED: ICD-10-CM

## 2024-10-04 PROCEDURE — 76536 US EXAM OF HEAD AND NECK: CPT

## 2024-10-30 NOTE — PROGRESS NOTES
AMB US Pelvic Non OB    Date/Time: 10/31/2024 10:30 AM    Performed by: Francy Gonzalez  Authorized by: Virginie Cuba DO  Universal Protocol:  Consent: Verbal consent obtained.  Consent given by: patient  Timeout called at: 10/31/2024 10:32 AM.  Patient understanding: patient states understanding of the procedure being performed  Patient identity confirmed: verbally with patient    Procedure details:     SIS Procedure: No    Technique:  Transvaginal US, Non-OB    Position: lithotomy exam    Left ovary findings:     Left ovary:  Visualized    Length (cm): 2.9    Height (cm): 1.56    Width (cm): 1.88  Right ovary findings:     Right ovary:  Visualized    Length (cm): 3.45    Height (cm): 2    Width (cm): 2.46  Other findings:     Free pelvic fluid: not identified      Free peritoneal fluid: not identified    Post-Procedure Details:     Impression:  The uterus has been surgically removed. The right ovary appears within normal limits. The left ovary again contains a 1.37cm stable cyst with debris. No free fluid.    Tolerance:  Tolerated well, no immediate complications    Complications: no complications    Additional Procedure Comments:      [facility] F8 E8C-RS transvaginal transducer Serial # [id number] was used to perform the examination today and subsequently followed with high level disinfection utilizing Trophon EPR procedure.     Ultrasound performed at:     [facility]  [address] [address]  Phone:  [phone]  Fax:  [phone]

## 2024-10-31 ENCOUNTER — ULTRASOUND (OUTPATIENT)
Dept: GYNECOLOGY | Facility: CLINIC | Age: 54
End: 2024-10-31
Payer: COMMERCIAL

## 2024-10-31 ENCOUNTER — ANNUAL EXAM (OUTPATIENT)
Dept: GYNECOLOGY | Facility: CLINIC | Age: 54
End: 2024-10-31
Payer: COMMERCIAL

## 2024-10-31 VITALS
WEIGHT: 141 LBS | SYSTOLIC BLOOD PRESSURE: 106 MMHG | HEIGHT: 63 IN | DIASTOLIC BLOOD PRESSURE: 60 MMHG | BODY MASS INDEX: 24.98 KG/M2

## 2024-10-31 DIAGNOSIS — N83.202 LEFT OVARIAN CYST: Primary | ICD-10-CM

## 2024-10-31 DIAGNOSIS — N83.202 LEFT OVARIAN CYST: ICD-10-CM

## 2024-10-31 DIAGNOSIS — N89.8 VAGINAL DISCHARGE: ICD-10-CM

## 2024-10-31 DIAGNOSIS — Z01.419 ENCOUNTER FOR ANNUAL ROUTINE GYNECOLOGICAL EXAMINATION: Primary | ICD-10-CM

## 2024-10-31 DIAGNOSIS — L90.0 LICHEN SCLEROSUS ET ATROPHICUS: ICD-10-CM

## 2024-10-31 LAB
BV WHIFF TEST VAG QL: NORMAL
CLUE CELLS SPEC QL WET PREP: NORMAL
PH SMN: 5 [PH]
SL AMB POCT WET MOUNT: NORMAL
T VAGINALIS VAG QL WET PREP: NORMAL
YEAST VAG QL WET PREP: NORMAL

## 2024-10-31 PROCEDURE — S0612 ANNUAL GYNECOLOGICAL EXAMINA: HCPCS | Performed by: OBSTETRICS & GYNECOLOGY

## 2024-10-31 PROCEDURE — 87210 SMEAR WET MOUNT SALINE/INK: CPT | Performed by: OBSTETRICS & GYNECOLOGY

## 2024-10-31 PROCEDURE — 76830 TRANSVAGINAL US NON-OB: CPT | Performed by: OBSTETRICS & GYNECOLOGY

## 2024-10-31 RX ORDER — CLOBETASOL PROPIONATE 0.5 MG/G
OINTMENT TOPICAL
Qty: 30 G | Refills: 1 | Status: SHIPPED | OUTPATIENT
Start: 2024-10-31

## 2024-10-31 NOTE — PROGRESS NOTES
Ambulatory Visit  Name: Ivonne Mcneal      : 1970      MRN: [id number]  Encounter Provider: Virginie Cuba DO  Encounter Date: 10/31/2024   Encounter department: [facility] [place]    Assessment & Plan  Encounter for annual routine gynecological examination         Lichen sclerosus et atrophicus    Orders:    clobetasol (TEMOVATE) 0.05 % ointment; Apply to vulva once a week    Vaginal discharge    Orders:    POCT wet mount    Left ovarian cyst         pap is up to date    mammogram reviewed with her including breast density.  Mammogram and fast breast MRI are ordered by breast surgery  Discussed self breast exams    colon cancer screening - colonoscopy done , recall in 10 years    Lichen sclerosus-this is well-controlled with clobetasol.  Prescription sent.    Left ovarian cyst - this is stable and asymptomatic.  Will recheck next year.  She has an ultrasound done through work every year and she will use this to check on the cyst    Vaginal discharge-I explained that her exam is normal and there was minimal discharge noted on exam.  Her wet mount is normal although there was only a small amount of lactobacilli noted.  She has discussed prebiotic's and probiotics with Dr. Sarah.  I explained that nothing needs to be done from a GYN standpoint about this as it will include prove on its own.    discussed preventive care, regular exercise and a healthy diet    History of Present Illness     Ivonne Mcneal is a 54 y.o. female who presents for yearly and US for left ovarian cyst, possible endometrioma.  This has been small and stable and asymptomatic.  She uses clobetasol ointment for lichen sclerosus.    Sees breast sx for her mammogram and MRI as she is high risk.  Normal 3D mammogram last December with scattered fibroglandular densities and intermediate risk and normal fast breast MRI in .  These are scheduled     She is on biest and progesterone and was just started on  testosterone this week.  This is managed by Dr Sarah    She was treated for a yeast infection in September and since then she has a clear vaginal discharge, no odor, no dryness, no itching.    Normal pap, negative HPV in     S/p supracervical hyst and b/l salpingectomy  Lichen sclerosus dx by urology  many years ago (2004)      Review of Systems   Constitutional: Negative.    Gastrointestinal: Negative.    Genitourinary: Negative.            Objective     LMP 09/09/2017 (Exact Date)     Physical Exam  Vitals and nursing note reviewed. Exam conducted with a chaperone present.   Constitutional:       Appearance: She is well-developed.   HENT:      Head: Normocephalic and atraumatic.   Neck:      Thyroid: No thyromegaly.   Cardiovascular:      Rate and Rhythm: Normal rate and regular rhythm.   Pulmonary:      Effort: Pulmonary effort is normal.      Breath sounds: Normal breath sounds.   Chest:   Breasts:     Right: Normal.      Left: Normal.      Comments: Examined seated and supine  Abdominal:      Palpations: Abdomen is soft.   Genitourinary:     General: Normal vulva.      Vagina: Normal.      Cervix: Normal.      Uterus: Absent.       Adnexa: Right adnexa normal and left adnexa normal.      Rectum: Normal.      Comments: Cervix is normal, uterus is surgically absent  Musculoskeletal:      Cervical back: Neck supple.   Skin:     General: Skin is warm and dry.   Neurological:      Mental Status: She is alert.   Psychiatric:         Mood and Affect: Mood normal.

## 2024-12-02 ENCOUNTER — HOSPITAL ENCOUNTER (OUTPATIENT)
Dept: MAMMOGRAPHY | Facility: MEDICAL CENTER | Age: 54
Discharge: HOME | End: 2024-12-02
Payer: COMMERCIAL

## 2024-12-02 VITALS — HEIGHT: 63 IN | BODY MASS INDEX: 25 KG/M2 | WEIGHT: 141.09 LBS

## 2024-12-02 DIAGNOSIS — Z12.31 VISIT FOR SCREENING MAMMOGRAM: ICD-10-CM

## 2024-12-02 PROCEDURE — 77063 BREAST TOMOSYNTHESIS BI: CPT

## 2024-12-02 PROCEDURE — 77067 SCR MAMMO BI INCL CAD: CPT

## 2024-12-09 ENCOUNTER — RESULTS FOLLOW-UP (OUTPATIENT)
Dept: SURGICAL ONCOLOGY | Facility: CLINIC | Age: 54
End: 2024-12-09

## 2025-02-10 ENCOUNTER — HOSPITAL ENCOUNTER (OUTPATIENT)
Dept: ULTRASOUND IMAGING | Facility: CLINIC | Age: 55
Discharge: HOME | End: 2025-02-10
Payer: COMMERCIAL

## 2025-02-10 ENCOUNTER — HOSPITAL ENCOUNTER (OUTPATIENT)
Dept: MAMMOGRAPHY | Facility: CLINIC | Age: 55
Discharge: HOME | End: 2025-02-10
Payer: COMMERCIAL

## 2025-02-10 VITALS — WEIGHT: 141 LBS | HEIGHT: 63 IN | BODY MASS INDEX: 24.98 KG/M2

## 2025-02-10 DIAGNOSIS — R92.8 ABNORMAL SCREENING MAMMOGRAM: ICD-10-CM

## 2025-02-10 PROCEDURE — 76642 ULTRASOUND BREAST LIMITED: CPT

## 2025-02-10 PROCEDURE — 77065 DX MAMMO INCL CAD UNI: CPT

## 2025-02-10 PROCEDURE — G0279 TOMOSYNTHESIS, MAMMO: HCPCS

## 2025-06-11 ENCOUNTER — HOSPITAL ENCOUNTER (OUTPATIENT)
Dept: MRI IMAGING | Facility: HOSPITAL | Age: 55
Discharge: HOME | End: 2025-06-11
Payer: COMMERCIAL

## 2025-06-11 DIAGNOSIS — Z91.89 INCREASED RISK OF BREAST CANCER: ICD-10-CM

## 2025-06-11 DIAGNOSIS — Z80.3 FAMILY HISTORY OF BREAST CANCER IN FEMALE: ICD-10-CM

## 2025-06-11 PROCEDURE — A9585 GADOBUTROL INJECTION: HCPCS | Performed by: NURSE PRACTITIONER

## 2025-06-11 RX ORDER — GADOBUTROL 604.72 MG/ML
6 INJECTION INTRAVENOUS
Status: COMPLETED | OUTPATIENT
Start: 2025-06-11 | End: 2025-06-11

## 2025-06-11 RX ADMIN — GADOBUTROL 6 ML: 604.72 INJECTION INTRAVENOUS at 12:44

## 2025-06-23 ENCOUNTER — OFFICE VISIT (OUTPATIENT)
Dept: SURGICAL ONCOLOGY | Facility: CLINIC | Age: 55
End: 2025-06-23
Payer: COMMERCIAL

## 2025-06-23 VITALS
WEIGHT: 141 LBS | DIASTOLIC BLOOD PRESSURE: 64 MMHG | OXYGEN SATURATION: 98 % | TEMPERATURE: 97.6 F | HEART RATE: 67 BPM | SYSTOLIC BLOOD PRESSURE: 118 MMHG | BODY MASS INDEX: 24.98 KG/M2 | HEIGHT: 63 IN

## 2025-06-23 DIAGNOSIS — Z91.89 INCREASED RISK OF BREAST CANCER: Primary | ICD-10-CM

## 2025-06-23 DIAGNOSIS — Z12.31 VISIT FOR SCREENING MAMMOGRAM: ICD-10-CM

## 2025-06-23 DIAGNOSIS — Z80.3 FAMILY HISTORY OF BREAST CANCER IN FEMALE: ICD-10-CM

## 2025-06-23 PROCEDURE — 99213 OFFICE O/P EST LOW 20 MIN: CPT | Performed by: NURSE PRACTITIONER

## 2025-06-23 RX ORDER — MECLIZINE HYDROCHLORIDE 25 MG/1
25 TABLET ORAL 3 TIMES DAILY PRN
COMMUNITY
Start: 2025-01-07 | End: 2026-01-07

## 2025-06-23 NOTE — PROGRESS NOTES
Name: Ivonne Mcneal      : 1970      MRN: [id number]  Encounter Provider: SEVERO Paz  Encounter Date: 2025   Encounter department: [facility] [place]  :  Assessment & Plan  Increased risk of breast cancer    Orders:  •  MRI Fast Breast; Future    Family history of breast cancer in female    Orders:  •  MRI Fast Breast; Future    Visit for screening mammogram    Orders:  •  Mammo screening bilateral w 3d and cad; Future      Patient is a 54-year-old female that presents today for follow-up visit for a family history of breast cancer, increased risk of breast cancer and fibrocystic changes. She underwent genetic testing which was negative.  I am screening her with annual 3D mammography and fast breast MRI.  She has an extremely high deductible therefore prefers the fast MRI.  She had a bilateral mammogram in 2024 which was BI-RADS 0 for a right breast asymmetry.  She had a diagnostic mammogram and ultrasound and the asymmetry corresponded with summation artifact.  She had the fast breast MRI in 2025 which was BI-RADS 1.  She offers no new complaints today and there are no worrisome findings on today's clinical exam.  Prescription given for mammogram and fast breast MRI.  She will receive a clinical breast exam with her gynecologist in approximately 6 months so I will see her back in 1 year or sooner should the need arise.  She was instructed to contact us with any changes or concerns in the interim.  All of her questions were answered today.       History of Present Illness   Ivonne Mcneal is a 54 y.o. year old female who presents today for follow-up visit.  She has not appreciated any changes on self exam.  Reports no changes in family history.  She had a bilateral mammogram in December which revealed a right breast asymmetry.  She had a diagnostic mammogram and ultrasound and the asymmetry corresponded to summation artifact.  This  "was a benign study.  She had a fast breast MRI earlier this month which was BI-RADS 1.       Review of Systems   Constitutional:  Negative for chills, fatigue and fever.   Respiratory:  Negative for cough and shortness of breath.    Cardiovascular:  Negative for chest pain.   Hematological:  Negative for adenopathy.   Psychiatric/Behavioral:  Negative for confusion.     A complete review of systems is negative other than that noted above in the HPI.           Objective   /64 (BP Location: Right arm, Patient Position: Sitting, Cuff Size: Standard)   Pulse 67   Temp 97.6 °F (36.4 °C) (Temporal)   Ht 5' 3\" (1.6 m)   Wt 64 kg (141 lb)   LMP 09/09/2017 (Exact Date)   SpO2 98%   BMI 24.98 kg/m²     Pain Screening:  Pain Score: 0-No pain  ECOG    Physical Exam  Vitals reviewed.   Constitutional:       Appearance: Normal appearance.   HENT:      Head: Normocephalic and atraumatic.   Pulmonary:      Effort: Pulmonary effort is normal.   Chest:   Breasts:     Right: Skin change (reduction scars) present. No swelling, bleeding, inverted nipple, mass, nipple discharge or tenderness.      Left: Skin change (reduction scars) and tenderness present. No swelling, bleeding, inverted nipple, mass or nipple discharge.   Lymphadenopathy:      Upper Body:      Right upper body: No supraclavicular or axillary adenopathy.      Left upper body: No supraclavicular or axillary adenopathy.     Neurological:      General: No focal deficit present.      Mental Status: She is alert and oriented to person, place, and time.     Psychiatric:         Mood and Affect: Mood normal.            "

## 2025-08-21 DIAGNOSIS — Z00.00 ENCOUNTER FOR PREVENTIVE HEALTH EXAMINATION: Primary | ICD-10-CM

## (undated) DEVICE — Device

## (undated) DEVICE — IRRIG ENDO FLO TUBING

## (undated) DEVICE — INTENDED FOR TISSUE SEPARATION, AND OTHER PROCEDURES THAT REQUIRE A SHARP SURGICAL BLADE TO PUNCTURE OR CUT.: Brand: BARD-PARKER SAFETY BLADES SIZE 15, STERILE

## (undated) DEVICE — GLOVE SRG LF STRL BGL SKNSNS 6.5 PF

## (undated) DEVICE — ENDOPATH 5MM CURVED SCISSORS WITH MONOPOLAR CAUTERY: Brand: ENDOPATH

## (undated) DEVICE — TUBING SMOKE EVAC W/FILTRATION DEVICE PLUMEPORT ACTIV

## (undated) DEVICE — MAYO STAND COVER: Brand: CONVERTORS

## (undated) DEVICE — Device: Brand: OLYMPUS

## (undated) DEVICE — ENDOPATH XCEL UNIVERSAL TROCAR STABLILITY SLEEVES: Brand: ENDOPATH XCEL

## (undated) DEVICE — UNIVERSAL GYN LAPAROSCOPY,KIT: Brand: CARDINAL HEALTH

## (undated) DEVICE — INTENDED FOR TISSUE SEPARATION, AND OTHER PROCEDURES THAT REQUIRE A SHARP SURGICAL BLADE TO PUNCTURE OR CUT.: Brand: BARD-PARKER SAFETY BLADES SIZE 11, STERILE

## (undated) DEVICE — [HIGH FLOW INSUFFLATOR,  DO NOT USE IF PACKAGE IS DAMAGED,  KEEP DRY,  KEEP AWAY FROM SUNLIGHT,  PROTECT FROM HEAT AND RADIOACTIVE SOURCES.]: Brand: PNEUMOSURE

## (undated) DEVICE — ENDOPOUCH RETRIEVER SPECIMEN RETRIEVAL BAGS: Brand: ENDOPOUCH RETRIEVER

## (undated) DEVICE — 3M™ STERI-DRAPE™ UNDER BUTTOCKS DRAPE WITH POUCH 1084: Brand: STERI-DRAPE™

## (undated) DEVICE — ENDOPATH XCEL BLADELESS TROCARS WITH STABILITY SLEEVES: Brand: ENDOPATH XCEL

## (undated) DEVICE — KIT INCLUDES: GTB17, ALEXIS CONTAINED EXT SYS 5BX CNGL3, GELPOINT MINI ADV ACCESS PLATFORM: Brand: ALEXIS CONTAINED EXTRACTION SYSTEM WITH GELPOINT MINI ADVANCED ACCESS PLATFORM

## (undated) DEVICE — GLOVE INDICATOR PI UNDERGLOVE SZ 6.5 BLUE

## (undated) DEVICE — 3M™ STERI-STRIP™ REINFORCED ADHESIVE SKIN CLOSURES, R1547, 1/2 IN X 4 IN (12 MM X 100 MM), 6 STRIPS/ENVELOPE: Brand: 3M™ STERI-STRIP™

## (undated) DEVICE — Device: Brand: TISSUE RETRIEVAL SYSTEM

## (undated) DEVICE — PLASTIC ADHESIVE BANDAGE: Brand: CURITY

## (undated) DEVICE — SCD SEQUENTIAL COMPRESSION COMFORT SLEEVE MEDIUM KNEE LENGTH: Brand: KENDALL SCD

## (undated) DEVICE — AEM CORD

## (undated) DEVICE — BLUNT TIP LAPAROSCOPIC SEALER/DIVIDER: Brand: LIGASURE

## (undated) DEVICE — INTENDED FOR TISSUE SEPARATION, AND OTHER PROCEDURES THAT REQUIRE A SHARP SURGICAL BLADE TO PUNCTURE OR CUT.: Brand: BARD-PARKER SAFETY BLADES SIZE 10, STERILE

## (undated) DEVICE — CHLORAPREP HI-LITE 26ML ORANGE

## (undated) DEVICE — SURGICEL 2 X 14

## (undated) DEVICE — BUTTON SWITCH PENCIL HOLSTER: Brand: VALLEYLAB

## (undated) DEVICE — SUT VICRYL 0 CT-1 36 IN J946H

## (undated) DEVICE — SUT VICRYL 4-0 PS-2 27 IN J426H

## (undated) DEVICE — FLEXIBLE ADHESIVE BANDAGE,X-LARGE: Brand: CURITY

## (undated) DEVICE — 3000CC GUARDIAN II: Brand: GUARDIAN

## (undated) DEVICE — PREMIUM DRY TRAY LF: Brand: MEDLINE INDUSTRIES, INC.